# Patient Record
Sex: MALE | Race: BLACK OR AFRICAN AMERICAN | NOT HISPANIC OR LATINO | Employment: OTHER | URBAN - METROPOLITAN AREA
[De-identification: names, ages, dates, MRNs, and addresses within clinical notes are randomized per-mention and may not be internally consistent; named-entity substitution may affect disease eponyms.]

---

## 2017-09-11 ENCOUNTER — OFFICE VISIT (OUTPATIENT)
Dept: FAMILY MEDICINE | Facility: CLINIC | Age: 37
End: 2017-09-11
Payer: COMMERCIAL

## 2017-09-11 VITALS
HEIGHT: 68 IN | OXYGEN SATURATION: 98 % | HEART RATE: 79 BPM | TEMPERATURE: 98.1 F | SYSTOLIC BLOOD PRESSURE: 108 MMHG | WEIGHT: 190 LBS | BODY MASS INDEX: 28.79 KG/M2 | DIASTOLIC BLOOD PRESSURE: 62 MMHG | RESPIRATION RATE: 16 BRPM

## 2017-09-11 DIAGNOSIS — B96.81 DUODENAL ULCER DUE TO HELICOBACTER PYLORI: ICD-10-CM

## 2017-09-11 DIAGNOSIS — F41.9 ANXIETY: ICD-10-CM

## 2017-09-11 DIAGNOSIS — K26.9 DUODENAL ULCER DUE TO HELICOBACTER PYLORI: ICD-10-CM

## 2017-09-11 DIAGNOSIS — A04.8 H. PYLORI INFECTION: Primary | ICD-10-CM

## 2017-09-11 PROCEDURE — 99213 OFFICE O/P EST LOW 20 MIN: CPT | Performed by: FAMILY MEDICINE

## 2017-09-11 RX ORDER — CITALOPRAM HYDROBROMIDE 10 MG/1
10 TABLET ORAL DAILY
Qty: 30 TABLET | Refills: 11 | Status: SHIPPED | OUTPATIENT
Start: 2017-09-11 | End: 2017-09-11

## 2017-09-11 RX ORDER — NICOTINE POLACRILEX 4 MG/1
20 GUM, CHEWING ORAL DAILY
Qty: 30 TABLET | Refills: 11 | Status: SHIPPED | OUTPATIENT
Start: 2017-09-11 | End: 2018-01-22

## 2017-09-11 RX ORDER — AMOXICILLIN 500 MG/1
1000 CAPSULE ORAL 3 TIMES DAILY
Qty: 56 CAPSULE | Refills: 0 | Status: SHIPPED | OUTPATIENT
Start: 2017-09-11 | End: 2017-09-11

## 2017-09-11 RX ORDER — METRONIDAZOLE 500 MG/1
500 TABLET ORAL 3 TIMES DAILY
Qty: 28 TABLET | Refills: 0 | Status: SHIPPED | OUTPATIENT
Start: 2017-09-11 | End: 2017-09-11

## 2017-09-11 RX ORDER — METRONIDAZOLE 500 MG/1
500 TABLET ORAL 3 TIMES DAILY
Qty: 28 TABLET | Refills: 0 | Status: SHIPPED | OUTPATIENT
Start: 2017-09-11 | End: 2018-01-22

## 2017-09-11 RX ORDER — CITALOPRAM HYDROBROMIDE 10 MG/1
10 TABLET ORAL DAILY
Qty: 30 TABLET | Refills: 11 | Status: SHIPPED | OUTPATIENT
Start: 2017-09-11 | End: 2018-01-22

## 2017-09-11 RX ORDER — AMOXICILLIN 500 MG/1
1000 CAPSULE ORAL 3 TIMES DAILY
Qty: 56 CAPSULE | Refills: 0 | Status: SHIPPED | OUTPATIENT
Start: 2017-09-11 | End: 2018-01-22

## 2017-09-11 RX ORDER — NICOTINE POLACRILEX 4 MG/1
20 GUM, CHEWING ORAL DAILY
Qty: 30 TABLET | Refills: 11 | Status: SHIPPED | OUTPATIENT
Start: 2017-09-11 | End: 2017-09-11

## 2017-09-11 ASSESSMENT — ANXIETY QUESTIONNAIRES
3. WORRYING TOO MUCH ABOUT DIFFERENT THINGS: NEARLY EVERY DAY
6. BECOMING EASILY ANNOYED OR IRRITABLE: NOT AT ALL
5. BEING SO RESTLESS THAT IT IS HARD TO SIT STILL: NOT AT ALL
1. FEELING NERVOUS, ANXIOUS, OR ON EDGE: NEARLY EVERY DAY
2. NOT BEING ABLE TO STOP OR CONTROL WORRYING: NEARLY EVERY DAY
GAD7 TOTAL SCORE: 9
7. FEELING AFRAID AS IF SOMETHING AWFUL MIGHT HAPPEN: NOT AT ALL
IF YOU CHECKED OFF ANY PROBLEMS ON THIS QUESTIONNAIRE, HOW DIFFICULT HAVE THESE PROBLEMS MADE IT FOR YOU TO DO YOUR WORK, TAKE CARE OF THINGS AT HOME, OR GET ALONG WITH OTHER PEOPLE: NOT DIFFICULT AT ALL

## 2017-09-11 ASSESSMENT — PATIENT HEALTH QUESTIONNAIRE - PHQ9
5. POOR APPETITE OR OVEREATING: NOT AT ALL
SUM OF ALL RESPONSES TO PHQ QUESTIONS 1-9: 4

## 2017-09-11 NOTE — PATIENT INSTRUCTIONS
1. MODIFIED FAST 250 CALORIES TWICE DAILY FEMALES  300 CALORIES TWICE DAILY FOR MALES   OATMEAL AND COTTAGE CHEESE WORK NICELY   COPIOUS WATER BETWEEN   5/2 PLAN DR HUSSEIN [facility]  NORMAL DIET 5 DAYS PER WEEK  SEMIFAST 2 DAYS PER WEEK  LOOK UP 5-2 PLAN ON THE INTERNET    Weight Loss Tips  1. Do not eat after 6 hrs before your expected bedtime  2. Have your heaviest meal for breakfast, a slightly lighter meal at lunch and a snack 6 hrs before bed  3. No sugar/calorie drinks except milk ie no fruit juice, pop, alcohol.  4. Drink milk OR WATER  30min before meals to decrease your hunger. Also it is excellent as part of your last meal of the day snack  5. Drink lots of water  6. Increase fiber in diet: all bran cereal, salads, popcorn etc  7. Have only one small serving of fruit a day about 1/2 cup (as this is high in sugar)  8. EXERCISE is the bottom line. Without it, you will gain weight even on a low calorie diet. Best if done 2-3X a day as can    2. The only way known to prevent diabetes or keep it from getting worse is exercise, 20-40 minutes 3 times a day around the time of meals      SLEEP 8 HOURS PER DAY    BLACK AND BLUEBERRIES EVERY DAY ANTINFLAMMATORY BENEFIT     PLAIN YOGURT SEVERAL TIMES DAILY AS TOLERATED IF NOT ALLERGIC     AVOID HIGH FRUCTOSE SYRUP AND OLENE IN YOUR DIET     GREEN TEA EXTRACT    PROBIOTIC CAPSULE DAILY  HIGH QUALITY     DON'T EAT LATE AT NIGHT    CHRISTOPH CAMPUZANOA HELPS WITH APPETITE    KEEP A BLESSINGS JOURNAL    888 BREATHER WHEN STRESSED OR COUNT BACK FROM 100 WITH SLOW BREATHING    POSITIVE AFFIRMATIONS         FIT BIT OR PEDOMETER 10,000 STEPS PER DAY     FIT BIT TWICH RECOMMENDED      90 MINUTES EXERCISE              COFFEE AND SUGAR AND TEA AND HIGH SUGAR CEREAL

## 2017-09-11 NOTE — PROGRESS NOTES
SUBJECTIVE:   Rodolfo Bennett is a 37 year old male who presents to clinic today for the following health issues:          Abnormal Mood Symptoms  anxiety      Duration: 3 months    Description:  Depression: no  Anxiety: YES  Panic attacks: no     Accompanying signs and symptoms: see PHQ-9 and GEOVANY scores    History (similar episodes/previous evaluation): occasional anxiety    Precipitating or alleviating factors: had a tooth surgery June 28,2017    Therapies tried and outcome: Celexa (Citalopram)    DENTAL IMPLANT REPLACEMENT    PAIN KILLERS     BREAST REMOVAL     COFFEE ANXIETY     Problem list and histories reviewed & adjusted, as indicated.  Additional history: as documented      Patient Active Problem List   Diagnosis     Hypertrophy of breast     Positive PPD     Abdominal tenderness, epigastric     Anxiety state     Depressive disorder, not elsewhere classified     Closed fracture of shaft of radius (alone)     H. pylori infection     Non morbid obesity due to excess calories     History reviewed. No pertinent surgical history.    Social History   Substance Use Topics     Smoking status: Never Smoker     Smokeless tobacco: Never Used     Alcohol use No     Family History   Problem Relation Age of Onset     DIABETES Mother      Unknown/Adopted Father          Current Outpatient Prescriptions   Medication Sig Dispense Refill     citalopram (CELEXA) 10 MG tablet Take 1 tablet (10 mg) by mouth daily 30 tablet 11     amoxicillin (AMOXIL) 500 MG capsule Take 2 capsules (1,000 mg) by mouth 3 times daily 56 capsule 0     metroNIDAZOLE (FLAGYL) 500 MG tablet Take 1 tablet (500 mg) by mouth 3 times daily 28 tablet 0     omeprazole 20 MG tablet Take 1 tablet (20 mg) by mouth daily Take 30-60 minutes before a meal. 30 tablet 11     [DISCONTINUED] citalopram (CELEXA) 10 MG tablet Take 1 tablet (10 mg) by mouth daily 30 tablet 11     [DISCONTINUED] citalopram (CELEXA) 10 MG tablet Take 1 tablet (10 mg) by mouth daily 30  "tablet 4     Allergies   Allergen Reactions     Dust Mites      Recent Labs   Lab Test  10/09/15   1212  06/25/15   1456  08/25/14   1207  10/07/13   1530   LDL  118   --    --    --    HDL  43   --    --    --    TRIG  66   --    --    --    ALT   --    --    --   51   CR   --   0.79  1.20  1.10   GFRESTIMATED   --   >90  Non  GFR Calc    69  77   GFRESTBLACK   --   >90   GFR Calc    84  >90   POTASSIUM   --   3.6  3.7  4.1   TSH  0.41   --   0.60  0.73      BP Readings from Last 3 Encounters:   09/11/17 108/62   10/06/16 102/62   06/08/16 113/76    Wt Readings from Last 3 Encounters:   09/11/17 190 lb (86.2 kg)   10/06/16 193 lb (87.5 kg)   06/08/16 190 lb (86.2 kg)                  Labs reviewed in EPIC          Reviewed and updated as needed this visit by clinical staff       Reviewed and updated as needed this visit by Provider         ROS:.PERSONAL HISTORY OF UNTREATED  H PYLORI   ANXIETY AND DEPRESSION   PARTIAL TREATMENT   HISTORY OF BREAT REMOVAL   HISTORY OF POSITIVE PPD   HISTORY OF EPIGASTRIC PAIN   HISTORY OF OBESITY IMPROVED TO OVER WEIGHT   has Hypertrophy of breast; Positive PPD; Abdominal tenderness, epigastric; Anxiety state; Depressive disorder, not elsewhere classified; Closed fracture of shaft of radius (alone); H. pylori infection; and Non morbid obesity due to excess calories on his problem list.    Constitutional, HEENT, cardiovascular, pulmonary, gi and gu systems are negative, except as otherwise noted.      OBJECTIVE:   /62  Pulse 79  Temp 98.1  F (36.7  C) (Tympanic)  Resp 16  Ht 5' 8\" (1.727 m)  Wt 190 lb (86.2 kg)  SpO2 98%  BMI 28.89 kg/m2  Body mass index is 28.89 kg/(m^2).  GENERAL: healthy, alert and no distress  EYES: Eyes grossly normal to inspection, PERRL and conjunctivae and sclerae normal  HENT: ear canals and TM's normal, nose and mouth without ulcers or lesions  NECK: no adenopathy, no asymmetry, masses, or scars and thyroid " normal to palpation  RESP: lungs clear to auscultation - no rales, rhonchi or wheezes  CV: regular rate and rhythm, normal S1 S2, no S3 or S4, no murmur, click or rub, no peripheral edema and peripheral pulses strong  ABDOMEN: soft, nontender, no hepatosplenomegaly, no masses and bowel sounds normal  MS: no gross musculoskeletal defects noted, no edema  SKIN: no suspicious lesions or rashes    Diagnostic Test Results:  Results for orders placed or performed in visit on 10/09/15   Vitamin D Deficiency   Result Value Ref Range    Vitamin D Deficiency screening 15 (L) 20 - 75 ug/L   TSH   Result Value Ref Range    TSH 0.41 0.40 - 5.00 mU/L   UA reflex to Microscopic and Culture   Result Value Ref Range    Color Urine Marcella     Appearance Urine Clear     Glucose Urine Negative NEG mg/dL    Bilirubin Urine (A) NEG     Small  This is an unconfirmed screening test result. A positive result may be false.      Ketones Urine Trace (A) NEG mg/dL    Specific Gravity Urine 1.025 1.003 - 1.035    Blood Urine Negative NEG    pH Urine 5.5 5.0 - 7.0 pH    Protein Albumin Urine Negative NEG mg/dL    Urobilinogen Urine 1.0 0.2 - 1.0 EU/dL    Nitrite Urine Negative NEG    Leukocyte Esterase Urine Negative NEG    Source Midstream Urine    Lipid panel reflex to direct LDL   Result Value Ref Range    Cholesterol 174 <200 mg/dL    Triglycerides 66 0 - 150 mg/dL    HDL Cholesterol 43 >40 mg/dL    LDL Cholesterol Calculated 118 0 - 129 mg/dL    VLDL-Cholesterol 13 0 - 30 mg/dL    Cholesterol/HDL Ratio 4.0 0.0 - 5.0   Glucose   Result Value Ref Range    Glucose 77 70 - 99 mg/dL   H Pylori antibody IgG   Result Value Ref Range    Specimen Description Whole Blood     Heliobacter pylori Antibody Screen (A)      Positive for IgG antibody to Helicobacter pylori. May indicate current, recent,   or past infection.      Micro Report Status FINAL 10/09/2015    Urine Microscopic   Result Value Ref Range    WBC Urine O - 2 0 - 2 /HPF    RBC Urine O - 2  0 - 2 /HPF    Squamous Epithelial /LPF Urine Few FEW /LPF    Mucous Urine Present (A) NEG /LPF       ASSESSMENT/PLAN:           ICD-10-CM    1. H. pylori infection A04.8    2. Anxiety F41.9 citalopram (CELEXA) 10 MG tablet     DISCONTINUED: citalopram (CELEXA) 10 MG tablet   3. Duodenal ulcer due to Helicobacter pylori K26.9 amoxicillin (AMOXIL) 500 MG capsule    B96.81 metroNIDAZOLE (FLAGYL) 500 MG tablet     omeprazole 20 MG tablet       Patient Instructions   1. MODIFIED FAST 250 CALORIES TWICE DAILY FEMALES  300 CALORIES TWICE DAILY FOR MALES   OATMEAL AND COTTAGE CHEESE WORK NICELY   COPIOUS WATER BETWEEN   5/2 PLAN DR HUSSEIN [facility]  NORMAL DIET 5 DAYS PER WEEK  SEMIFAST 2 DAYS PER WEEK  LOOK UP 5-2 PLAN ON THE INTERNET    Weight Loss Tips  1. Do not eat after 6 hrs before your expected bedtime  2. Have your heaviest meal for breakfast, a slightly lighter meal at lunch and a snack 6 hrs before bed  3. No sugar/calorie drinks except milk ie no fruit juice, pop, alcohol.  4. Drink milk OR WATER  30min before meals to decrease your hunger. Also it is excellent as part of your last meal of the day snack  5. Drink lots of water  6. Increase fiber in diet: all bran cereal, salads, popcorn etc  7. Have only one small serving of fruit a day about 1/2 cup (as this is high in sugar)  8. EXERCISE is the bottom line. Without it, you will gain weight even on a low calorie diet. Best if done 2-3X a day as can    2. The only way known to prevent diabetes or keep it from getting worse is exercise, 20-40 minutes 3 times a day around the time of meals      SLEEP 8 HOURS PER DAY    BLACK AND BLUEBERRIES EVERY DAY ANTINFLAMMATORY BENEFIT     PLAIN YOGURT SEVERAL TIMES DAILY AS TOLERATED IF NOT ALLERGIC     AVOID HIGH FRUCTOSE SYRUP AND OLENE IN YOUR DIET     GREEN TEA EXTRACT    PROBIOTIC CAPSULE DAILY  HIGH QUALITY     DON'T EAT LATE AT NIGHT    FARRAHUMA FIMBRIATA HELPS WITH APPETITE    KEEP A BLESSINGS JOURNAL    888  BREATHER WHEN STRESSED OR COUNT BACK FROM 100 WITH SLOW BREATHING    POSITIVE AFFIRMATIONS         FIT BIT OR PEDOMETER 10,000 STEPS PER DAY     FIT RADHA LEZAMA RECOMMENDED      90 MINUTES EXERCISE              COFFEE AND SUGAR AND TEA AND HIGH SUGAR CEREAL           MARTY KARIMI MD  [facility]

## 2017-09-11 NOTE — NURSING NOTE
"Chief Complaint   Patient presents with     Anxiety       Initial /62  Pulse 79  Temp 98.1  F (36.7  C) (Tympanic)  Resp 16  Ht 5' 8\" (1.727 m)  Wt 190 lb (86.2 kg)  SpO2 98%  BMI 28.89 kg/m2 Estimated body mass index is 28.89 kg/(m^2) as calculated from the following:    Height as of this encounter: 5' 8\" (1.727 m).    Weight as of this encounter: 190 lb (86.2 kg).  Medication Reconciliation: complete   Allie Shelton CMA    "

## 2017-09-11 NOTE — MR AVS SNAPSHOT
After Visit Summary   9/11/2017    Rodolfo Bennett    MRN: [id number]           Patient Information     Date Of Birth          1980        Visit Information        Provider Department      9/11/2017 3:45 PM Aldo Ricci MD [facility]        Today's Diagnoses     H. pylori infection    -  1    Anxiety        Duodenal ulcer due to Helicobacter pylori          Care Instructions    1. MODIFIED FAST 250 CALORIES TWICE DAILY FEMALES  300 CALORIES TWICE DAILY FOR MALES   OATMEAL AND COTTAGE CHEESE WORK NICELY   COPIOUS WATER BETWEEN   5/2 PLAN DR [facility]USSEIN [facility]  NORMAL DIET 5 DAYS PER WEEK  SEMIFAST 2 DAYS PER WEEK  LOOK UP 5-2 PLAN ON THE INTERNET    Weight Loss Tips  1. Do not eat after 6 hrs before your expected bedtime  2. Have your heaviest meal for breakfast, a slightly lighter meal at lunch and a snack 6 hrs before bed  3. No sugar/calorie drinks except milk ie no fruit juice, pop, alcohol.  4. Drink milk OR WATER  30min before meals to decrease your hunger. Also it is excellent as part of your last meal of the day snack  5. Drink lots of water  6. Increase fiber in diet: all bran cereal, salads, popcorn etc  7. Have only one small serving of fruit a day about 1/2 cup (as this is high in sugar)  8. EXERCISE is the bottom line. Without it, you will gain weight even on a low calorie diet. Best if done 2-3X a day as can    2. The only way known to prevent diabetes or keep it from getting worse is exercise, 20-40 minutes 3 times a day around the time of meals      SLEEP 8 HOURS PER DAY    BLACK AND BLUEBERRIES EVERY DAY ANTINFLAMMATORY BENEFIT     PLAIN YOGURT SEVERAL TIMES DAILY AS TOLERATED IF NOT ALLERGIC     AVOID HIGH FRUCTOSE SYRUP AND OLENE IN YOUR DIET     GREEN TEA EXTRACT    PROBIOTIC CAPSULE DAILY  HIGH QUALITY     DON'T EAT LATE AT NIGHT    CARALLUMA FIMBRIATA HELPS WITH APPETITE    KEEP A BLESSINGS JOURNAL    888 BREATHER WHEN STRESSED  "OR COUNT BACK FROM 100 WITH SLOW BREATHING    POSITIVE AFFIRMATIONS         FIT RADHA OR PEDOMETER 10,000 STEPS PER DAY     FIT BIT TWICH RECOMMENDED      90 MINUTES EXERCISE              COFFEE AND SUGAR AND TEA AND HIGH SUGAR CEREAL               Follow-ups after your visit        Who to contact     If you have questions or need follow up information about today's clinic visit or your schedule please contact [facility] directly at [phone].  Normal or non-critical lab and imaging results will be communicated to you by Trip[employer], letter or phone within 4 business days after the clinic has received the results. If you do not hear from us within 7 days, please contact the clinic through [employer] or phone. If you have a critical or abnormal lab result, we will notify you by phone as soon as possible.  Submit refill requests through [employer] or call your pharmacy and they will forward the refill request to us. Please allow 3 business days for your refill to be completed.          Additional Information About Your Visit        [employer] Information     [employer] lets you send messages to your doctor, view your test results, renew your prescriptions, schedule appointments and more. To sign up, go to [email or web address] . Click on \"Log in\" on the left side of the screen, which will take you to the Welcome page. Then click on \"Sign up Now\" on the right side of the page.     You will be asked to enter the access code listed below, as well as some personal information. Please follow the directions to create your username and password.     Your access code is: [email or web address]  Expires: 12/10/2017  4:30 PM     Your access code will  in 90 days. If you need help or a new code, please call your [place] clinic or [phone].        Care EveryWhere ID     This is your Care EveryWhere ID. This could be used by other organizations to access your [place] medical " "records  [id number]        Your Vitals Were     Pulse Temperature Respirations Height Pulse Oximetry BMI (Body Mass Index)    79 98.1  F (36.7  C) (Tympanic) 16 5' 8\" (1.727 m) 98% 28.89 kg/m2       Blood Pressure from Last 3 Encounters:   09/11/17 108/62   10/06/16 102/62   06/08/16 113/76    Weight from Last 3 Encounters:   09/11/17 190 lb (86.2 kg)   10/06/16 193 lb (87.5 kg)   06/08/16 190 lb (86.2 kg)              Today, you had the following     No orders found for display         Today's Medication Changes          These changes are accurate as of: 9/11/17  4:30 PM.  If you have any questions, ask your nurse or doctor.               Start taking these medicines.        Dose/Directions    amoxicillin 500 MG capsule   Commonly known as:  AMOXIL   Used for:  Duodenal ulcer due to Helicobacter pylori   Started by:  Aldo Ricci MD        Dose:  1000 mg   Take 2 capsules (1,000 mg) by mouth 3 times daily   Quantity:  56 capsule   Refills:  0       citalopram 10 MG tablet   Commonly known as:  celeXA   Used for:  Anxiety   Started by:  Aldo Ricci MD        Dose:  10 mg   Take 1 tablet (10 mg) by mouth daily   Quantity:  30 tablet   Refills:  11       metroNIDAZOLE 500 MG tablet   Commonly known as:  FLAGYL   Used for:  Duodenal ulcer due to Helicobacter pylori   Started by:  Aldo Ricci MD        Dose:  500 mg   Take 1 tablet (500 mg) by mouth 3 times daily   Quantity:  28 tablet   Refills:  0       omeprazole 20 MG tablet   Used for:  Duodenal ulcer due to Helicobacter pylori   Started by:  Aldo Ricci MD        Dose:  20 mg   Take 1 tablet (20 mg) by mouth daily Take 30-60 minutes before a meal.   Quantity:  30 tablet   Refills:  11            Where to get your medicines      These medications were sent to [facility] [id number] [place], [place] - [address] [place], [place] [id number]     Phone:  [phone]     amoxicillin 500 MG capsule "    citalopram 10 MG tablet    metroNIDAZOLE 500 MG tablet    omeprazole 20 MG tablet                Primary Care Provider Office Phone # Fax #    Aldo Aliza Ricci [phone] [id number]  [facility] [address]        Equal Access to Services     MIA SILVEIRA : Hadii jenaro ku hadasho Soomaali, waaxda luqadaha, qaybta kaalmada adeegyada, waxay lola eliseon radha bernardjv laadela rowan. So [facility] [phone].    ATENCIÓN: Si habla español, tiene a flood disposición servicios gratuitos de asistencia lingüística. Llame al [phone].    We comply with applicable federal civil rights laws and [place] laws. We do not discriminate on the basis of race, color, national origin, age, disability sex, sexual orientation or gender identity.            Thank you!     Thank you for choosing [facility]  for your care. Our goal is always to provide you with excellent care. Hearing back from our patients is one way we can continue to improve our services. Please take a few minutes to complete the written survey that you may receive in the mail after your visit with us. Thank you!             Your Updated Medication List - Protect others around you: Learn how to safely use, store and throw away your medicines at www.disposemymeds.org.          This list is accurate as of: 9/11/17  4:30 PM.  Always use your most recent med list.                   Brand Name Dispense Instructions for use Diagnosis    amoxicillin 500 MG capsule    AMOXIL    56 capsule    Take 2 capsules (1,000 mg) by mouth 3 times daily    Duodenal ulcer due to Helicobacter pylori       citalopram 10 MG tablet    celeXA    30 tablet    Take 1 tablet (10 mg) by mouth daily    Anxiety       metroNIDAZOLE 500 MG tablet    FLAGYL    28 tablet    Take 1 tablet (500 mg) by mouth 3 times daily    Duodenal ulcer due to Helicobacter pylori       omeprazole 20 MG tablet     30 tablet    Take 1 tablet (20 mg) by mouth  daily Take 30-60 minutes before a meal.    Duodenal ulcer due to Helicobacter pylori

## 2017-09-12 ASSESSMENT — ANXIETY QUESTIONNAIRES: GAD7 TOTAL SCORE: 9

## 2018-01-22 ENCOUNTER — OFFICE VISIT (OUTPATIENT)
Dept: FAMILY MEDICINE | Facility: CLINIC | Age: 38
End: 2018-01-22
Payer: COMMERCIAL

## 2018-01-22 VITALS
SYSTOLIC BLOOD PRESSURE: 108 MMHG | OXYGEN SATURATION: 98 % | RESPIRATION RATE: 16 BRPM | TEMPERATURE: 97.4 F | DIASTOLIC BLOOD PRESSURE: 64 MMHG | WEIGHT: 200 LBS | BODY MASS INDEX: 30.31 KG/M2 | HEART RATE: 57 BPM | HEIGHT: 68 IN

## 2018-01-22 DIAGNOSIS — L73.9 FOLLICULITIS: ICD-10-CM

## 2018-01-22 DIAGNOSIS — B37.2 YEAST INFECTION OF THE SKIN: Primary | ICD-10-CM

## 2018-01-22 PROCEDURE — 99213 OFFICE O/P EST LOW 20 MIN: CPT | Performed by: FAMILY MEDICINE

## 2018-01-22 RX ORDER — KETOCONAZOLE 20 MG/G
CREAM TOPICAL 2 TIMES DAILY
Qty: 30 G | Refills: 11 | Status: SHIPPED | OUTPATIENT
Start: 2018-01-22 | End: 2019-09-06

## 2018-01-22 RX ORDER — TRIAMCINOLONE ACETONIDE 1 MG/G
CREAM TOPICAL
Qty: 80 G | Refills: 0 | Status: SHIPPED | OUTPATIENT
Start: 2018-01-22 | End: 2018-12-24

## 2018-01-22 RX ORDER — CETIRIZINE HYDROCHLORIDE 10 MG/1
10 TABLET ORAL EVERY EVENING
Qty: 30 TABLET | Refills: 11 | Status: SHIPPED | OUTPATIENT
Start: 2018-01-22 | End: 2019-09-06

## 2018-01-22 NOTE — MR AVS SNAPSHOT
"              After Visit Summary   1/22/2018    Rodolfo Bennett    MRN: [id number]           Patient Information     Date Of Birth          1980        Visit Information        Provider Department      1/22/2018 1:30 PM Aldo Ricci MD [facility]        Today's Diagnoses     Yeast infection of the skin    -  1    Folliculitis          Care Instructions    (B37.2) Yeast infection of the skin  (primary encounter diagnosis)  Comment: groin creases   Plan: ketoconazole (NIZORAL) 2 % cream             (L73.9) Folliculitis  Comment: pubic area bid   Plan: triamcinolone (KENALOG) 0.1 % cream, cetirizine        (ZYRTEC) 10 MG tablet                            Follow-ups after your visit        Who to contact     If you have questions or need follow up information about today's clinic visit or your schedule please contact [facility] directly at [phone].  Normal or non-critical lab and imaging results will be communicated to you by [employer], letter or phone within 4 business days after the clinic has received the results. If you do not hear from us within 7 days, please contact the clinic through [employer]av[employer]art or phone. If you have a critical or abnormal lab result, we will notify you by phone as soon as possible.  Submit refill requests through [employer] or call your pharmacy and they will forward the refill request to us. Please allow 3 business days for your refill to be completed.          Additional Information About Your Visit        Jammin Javahart Information     [employer] lets you send messages to your doctor, view your test results, renew your prescriptions, schedule appointments and more. To sign up, go to [email or web address] . Click on \"Log in\" on the left side of the screen, which will take you to the Welcome page. Then click on \"Sign up Now\" on the right side of the page.     You will be asked to enter the access code listed below, as " "well as some personal information. Please follow the directions to create your username and password.     Your access code is: [email or web address]  Expires: 2018  1:55 PM     Your access code will  in 90 days. If you need help or a new code, please call your [facility] clinic or [phone].        Care EveryWhere ID     This is your Care EveryWhere ID. This could be used by other organizations to access your [facility] medical records  [id number]        Your Vitals Were     Pulse Temperature Respirations Height Pulse Oximetry BMI (Body Mass Index)    57 97.4  F (36.3  C) (Tympanic) 16 5' 8\" (1.727 m) 98% 30.41 kg/m2       Blood Pressure from Last 3 Encounters:   18 108/64   17 108/62   10/06/16 102/62    Weight from Last 3 Encounters:   18 200 lb (90.7 kg)   17 190 lb (86.2 kg)   10/06/16 193 lb (87.5 kg)              Today, you had the following     No orders found for display         Today's Medication Changes          These changes are accurate as of: 18  1:55 PM.  If you have any questions, ask your nurse or doctor.               Start taking these medicines.        Dose/Directions    cetirizine 10 MG tablet   Commonly known as:  zyrTEC   Used for:  Folliculitis   Started by:  Aldo Ricci MD        Dose:  10 mg   Take 1 tablet (10 mg) by mouth every evening   Quantity:  30 tablet   Refills:  11       ketoconazole 2 % cream   Commonly known as:  NIZORAL   Used for:  Yeast infection of the skin   Started by:  Aldo Ricci MD        Apply topically 2 times daily   Quantity:  30 g   Refills:  11       triamcinolone 0.1 % cream   Commonly known as:  KENALOG   Used for:  Folliculitis   Started by:  Aldo Ricci MD        Apply sparingly to affected area three times daily as needed   Quantity:  80 g   Refills:  0            Where to get your medicines      These medications were sent to [facility] [id number] [place], [place] - [address] [place] [address] " [facility] [address]     Phone:  [phone]     cetirizine 10 MG tablet    ketoconazole 2 % cream    triamcinolone 0.1 % cream                Primary Care Provider Office Phone # Fax #    Aldo Aliza Ricci [phone]       [address]  [facility] [address]        Equal Access to Services     MIA SILVEIRA : Hadii aad ku hadasho Soomaali, waaxda luqadaha, qaybta kaalmada adeegyada, waxay idiin hayaan adeeg kharash la'aan ah. So [facility] [phone].    ATENCIÓN: Si habla español, tiene a folod disposición servicios gratuitos de asistencia lingüística. Paulineame al [phone].    We comply with applicable federal civil rights laws and [place] laws. We do not discriminate on the basis of race, color, national origin, age, disability, sex, sexual orientation, or gender identity.            Thank you!     Thank you for choosing [facility]  for your care. Our goal is always to provide you with excellent care. Hearing back from our patients is one way we can continue to improve our services. Please take a few minutes to complete the written survey that you may receive in the mail after your visit with us. Thank you!             Your Updated Medication List - Protect others around you: Learn how to safely use, store and throw away your medicines at www.disposemymeds.org.          This list is accurate as of: 1/22/18  1:55 PM.  Always use your most recent med list.                   Brand Name Dispense Instructions for use Diagnosis    amoxicillin 500 MG capsule    AMOXIL    56 capsule    Take 2 capsules (1,000 mg) by mouth 3 times daily    Duodenal ulcer due to Helicobacter pylori       cetirizine 10 MG tablet    zyrTEC    30 tablet    Take 1 tablet (10 mg) by mouth every evening    Folliculitis       citalopram 10 MG tablet    celeXA    30 tablet    Take 1 tablet (10 mg) by mouth daily    Anxiety       ketoconazole 2 % cream    NIZORAL    30 g     Apply topically 2 times daily    Yeast infection of the skin       metroNIDAZOLE 500 MG tablet    FLAGYL    28 tablet    Take 1 tablet (500 mg) by mouth 3 times daily    Duodenal ulcer due to Helicobacter pylori       omeprazole 20 MG tablet     30 tablet    Take 1 tablet (20 mg) by mouth daily Take 30-60 minutes before a meal.    Duodenal ulcer due to Helicobacter pylori       triamcinolone 0.1 % cream    KENALOG    80 g    Apply sparingly to affected area three times daily as needed    Folliculitis

## 2018-01-22 NOTE — PROGRESS NOTES
SUBJECTIVE:   Rodolfo Bennett is a 37 year old male who presents to clinic today for the following health issues:      Rash      Duration: 3 weeks    Description  Location: groin area, back  Itching: severe    Intensity:  moderate    Accompanying signs and symptoms: redness on back    History (similar episodes/previous evaluation): uses Jacuzzi and hot tubs at the [facility]    Precipitating or alleviating factors:  New exposures:  None  Recent travel: no      Therapies tried and outcome: none    LOSING WEIGHT AND EXERCISING    GROIN AND RIGHT SUPRAPUBIC ITCHING    WORSE WHEN DRIVING A CAB OR AFTER HOT SHOWER    HISTORY OF ANXIETY AND DEPRESSION     HISTORY O FPOS PPD     HISTORY OF  RADIUS SHAFT FRACTURE     HISTORY OF H  PYLORI     MORBID OBESITY IMPROVING with DIET AND EXERCISE    WORRIED ABOUT HERPES    DENIES RECENT SEXUAL ACTIVITY  .*    .  Current Outpatient Prescriptions   Medication Sig Dispense Refill     ketoconazole (NIZORAL) 2 % cream Apply topically 2 times daily 30 g 11     triamcinolone (KENALOG) 0.1 % cream Apply sparingly to affected area three times daily as needed 80 g 0     cetirizine (ZYRTEC) 10 MG tablet Take 1 tablet (10 mg) by mouth every evening 30 tablet 11     citalopram (CELEXA) 10 MG tablet Take 1 tablet (10 mg) by mouth daily (Patient not taking: Reported on 1/22/2018) 30 tablet 11     metroNIDAZOLE (FLAGYL) 500 MG tablet Take 1 tablet (500 mg) by mouth 3 times daily (Patient not taking: Reported on 1/22/2018) 28 tablet 0     amoxicillin (AMOXIL) 500 MG capsule Take 2 capsules (1,000 mg) by mouth 3 times daily (Patient not taking: Reported on 1/22/2018) 56 capsule 0     omeprazole 20 MG tablet Take 1 tablet (20 mg) by mouth daily Take 30-60 minutes before a meal. (Patient not taking: Reported on 1/22/2018) 30 tablet 11          Allergies   Allergen Reactions     Dust Mites          There is no immunization history on file for this patient.      reports that he does not drink alcohol.       reports that he does not use illicit drugs.    family history includes DIABETES in his mother; Unknown/Adopted in his father.    indicated that his mother is alive. He indicated that his father is .      has no past surgical history on file.     reports that he does not engage in sexual activity.  .  Pediatric History   Patient Guardian Status     Mother:  Angle Bennett     Other Topics Concern     Parent/Sibling W/ Cabg, Mi Or Angioplasty Before 65f 55m? No     Social History Narrative         reports that he has never smoked. He has never used smokeless tobacco.    Medical, social, surgical, and family histories reviewed.    Labs reviewed in EPIC  Patient Active Problem List   Diagnosis     Hypertrophy of breast     Positive PPD     Abdominal tenderness, epigastric     Anxiety state     Depressive disorder, not elsewhere classified     Closed fracture of shaft of radius (alone)     H. pylori infection     Non morbid obesity due to excess calories     History reviewed. No pertinent surgical history.    Social History   Substance Use Topics     Smoking status: Never Smoker     Smokeless tobacco: Never Used     Alcohol use No     Family History   Problem Relation Age of Onset     DIABETES Mother      Unknown/Adopted Father          Allergies   Allergen Reactions     Dust Mites      Recent Labs   Lab Test  10/09/15   1212  06/25/15   1456  14   1207  10/07/13   1530   LDL  118   --    --    --    HDL  43   --    --    --    TRIG  66   --    --    --    ALT   --    --    --   51   CR   --   0.79  1.20  1.10   GFRESTIMATED   --   >90  Non  GFR Calc    69  77   GFRESTBLACK   --   >90   GFR Calc    84  >90   POTASSIUM   --   3.6  3.7  4.1   TSH  0.41   --   0.60  0.73        BP Readings from Last 6 Encounters:   18 108/64   17 108/62   10/06/16 102/62   16 113/76   16 125/74   16 100/64       Wt Readings from Last 3 Encounters:   18 200 lb  (90.7 kg)   09/11/17 190 lb (86.2 kg)   10/06/16 193 lb (87.5 kg)         Positive symptoms or findings indicated by bold designation:     ROS: 10 point ROS neg other than the symptoms noted above in the HPI.except  has Hypertrophy of breast; Positive PPD; Abdominal tenderness, epigastric; Anxiety state; Depressive disorder, not elsewhere classified; Closed fracture of shaft of radius (alone); H. pylori infection; and Non morbid obesity due to excess calories on his problem list.   Constitutional: The patient denied fatigue, fever, insomnia, night sweats, recent illness and weight loss.  SIGNIFICANT OBESITY MILD     Eyes: The patient denied blindness, eye pain, eye tearing, photophobia, vision change and visual disturbance.       Ears/Nose/Throat/Neck: The patient denied dizziness, facial pain, hearing loss, nasal discharge, oral pain, otalgia, postnasal drip, sinus congestion, sore throat, tinnitus and voice change.       Cardiovascular: The patient denied arrhythmia, chest pain/pressure, claudication, edema, exercise intolerance, fatigue, orthopnea, palpitations and syncope.      Respiratory: The patient denied asthma, chest congestion, cough, dyspnea on exertion, dyspnea/shortness of breath, hemoptysis, pedal edema, pleuritic pain, productive sputum, snoring and wheezing.     Gastrointestinal: The patient denied abdominal pain, anorexia, constipation, diarrhea, dysphagia, gastroesophageal reflux, hematochezia, hemorrhoids, melena, nausea and vomiting .     Genitourinary/Nephrology: The patient denied breast complaint, dysuria, nocturia sexual dysfunction, t, urinary frequency, urinary incontinence, urinary urgency        Musculoskeletal: The patient denied arthralgia(s), back pain, joint complaint, muscle weakness, myalgias, osteoporosis, sciatica, stiffness and swelling.      Dermatoligic:: The patient denied acne, dermatitis, ecchymosis, itching, mole change, rash, skin cancer, skin lesion and sores.  RASH  "GROIN AND RIGHT SUPRAPUBIC FOLLICULAR    Neurologic: The patient denied dizziness, gait abnormality, headache, memory loss, mental status change, paresis, paresthesia, seizure, syncope, tremor and vision change.     Psychiatric: The patient denied anxiety, depression, disturbances of memory, drug abuse, insomnia, mood swings and relationship difficulties.      Endocrine: The patient denied , goiter, obesity, polyuria and thyroid disease.      Hematologic/Lymphatic: The patient denied abnormal bleeding and bruising, abnormal ecchymoses, anemia, lymph node enlargement/mass, petechiae and venous  Thrombosis.      Allergy/Immunology: The patient denied food allergy and  Allergic rhinitis or conjunctivitis.        PE:  /64  Pulse 57  Temp 97.4  F (36.3  C) (Tympanic)  Resp 16  Ht 5' 8\" (1.727 m)  Wt 200 lb (90.7 kg)  SpO2 98%  BMI 30.41 kg/m2 Body mass index is 30.41 kg/(m^2).    Constitutional: general appearance, well nourished, well developed, in no acute distress, well developed, appears stated age, normal body habitus,      Eyes:; The patient has normal eyelids sclerae and conjunctivae :      Ears/Nose/Throat: external ear, overall: normal appearance; external nose, overall: benign appearance, normal moujth gums and lips  The patient has:      Neck: thyroid, overall: normal size, normal consistency, nontender,      Respiratory:  palpation of chest, overall: normal excursion,    Clear to percussion and auscultation  NORMAL     Tachypnea  NORMAL  Color  NORMAL     Cardiovascular:  Good color with no peripheral edema    Regular sinus rhythm without murmur. Physiologic heart sounds Heart is unelarged  .   Chest/Breast: normal shape        Abdominal exam,  Liver and spleen are  unenlarged        Tenderness    Scars  NORMAL      Urogenital; no renal, flank or bladder  tenderness;  NORMAL      Lymphatic: neck nodes,  NORMAL    Other nodes     Musculoskeletal:  Brief ortho exam normal except:   NORMAL RANGE OF " MOTION UPPER EXTREMETIES AND LOWER EXTREMITY BILATERAL AND LOWER BACK PAIN     Integument: inspection of skin, no rash, lesions; and, palpation, no induration, no tenderness.  IRRITATION OF BILATERAL GROIN AND RIGHT SUPRAPUBIC FOLLICULAR CHANGES     Neurologic mental status, overall: alert and oriented; gait, no ataxia, no unsteadiness; coordination, no tremors; cranial nerves, overall: normal motor, overall: normal bulk, tone.      Psychiatric: orientation/consciousness, overall: oriented to person, place and time; behavior/psychomotor activity, no tics, normal psychomotor activity; mood and affect, overall: normal mood and affect; appearance, overall: well-groomed, good eye contact; speech, overall: normal quality, no aphasia and normal quality, quantity, intact.      Diagnostic Test Results:  Results for orders placed or performed in visit on 10/09/15   Vitamin D Deficiency   Result Value Ref Range    Vitamin D Deficiency screening 15 (L) 20 - 75 ug/L   TSH   Result Value Ref Range    TSH 0.41 0.40 - 5.00 mU/L   UA reflex to Microscopic and Culture   Result Value Ref Range    Color Urine Marcella     Appearance Urine Clear     Glucose Urine Negative NEG mg/dL    Bilirubin Urine (A) NEG     Small  This is an unconfirmed screening test result. A positive result may be false.      Ketones Urine Trace (A) NEG mg/dL    Specific Gravity Urine 1.025 1.003 - 1.035    Blood Urine Negative NEG    pH Urine 5.5 5.0 - 7.0 pH    Protein Albumin Urine Negative NEG mg/dL    Urobilinogen Urine 1.0 0.2 - 1.0 EU/dL    Nitrite Urine Negative NEG    Leukocyte Esterase Urine Negative NEG    Source Midstream Urine    Lipid panel reflex to direct LDL   Result Value Ref Range    Cholesterol 174 <200 mg/dL    Triglycerides 66 0 - 150 mg/dL    HDL Cholesterol 43 >40 mg/dL    LDL Cholesterol Calculated 118 0 - 129 mg/dL    VLDL-Cholesterol 13 0 - 30 mg/dL    Cholesterol/HDL Ratio 4.0 0.0 - 5.0   Glucose   Result Value Ref Range    Glucose 77  70 - 99 mg/dL   H Pylori antibody IgG   Result Value Ref Range    Specimen Description Whole Blood     Heliobacter pylori Antibody Screen (A)      Positive for IgG antibody to Helicobacter pylori. May indicate current, recent,   or past infection.      Micro Report Status FINAL 10/09/2015    Urine Microscopic   Result Value Ref Range    WBC Urine O - 2 0 - 2 /HPF    RBC Urine O - 2 0 - 2 /HPF    Squamous Epithelial /LPF Urine Few FEW /LPF    Mucous Urine Present (A) NEG /LPF         ICD-10-CM    1. Yeast infection of the skin B37.2 ketoconazole (NIZORAL) 2 % cream    groin creases    2. Folliculitis L73.9 triamcinolone (KENALOG) 0.1 % cream     cetirizine (ZYRTEC) 10 MG tablet    pubic area bid         .  Side effects benefits and risks thoroughly discussed. .he may come in early if unimproved or getting worse      Importance of adhering to regimen discussed and if medications were dispensed, the importance of taking medications discussed and bringing in the medications after every visit for chronic problems     Please drink 2 glasses of water prior to meals and walk 15-30 minutes after meals    I spent  15 MINUTES   with patient discussing the following issues   The primary encounter diagnosis was Yeast infection of the skin. A diagnosis of Folliculitis was also pertinent to this visit. over half of which involved counseling and coordination of care.    Patient Instructions   (B37.2) Yeast infection of the skin  (primary encounter diagnosis)  Comment: groin creases   Plan: ketoconazole (NIZORAL) 2 % cream             (L73.9) Folliculitis  Comment: pubic area bid   Plan: triamcinolone (KENALOG) 0.1 % cream, cetirizine        (ZYRTEC) 10 MG tablet                        ALL THE ABOVE PROBLEMS ARE STABLE AND MED CHANGES AS NOTED    Diet:  MEDITERRANEAN DIET     Exercise:  UPPER EXTREMETIES AND LOWER EXTREMITY AND LOWER BACK PAIN   Exercises Range of motion, balance, isometric, and strengthening exercises 30  repetitions twice daily of involved joints      .MARTY KARIMI MD 1/22/2018 2:07 PM  January 22, 2018

## 2018-01-22 NOTE — PATIENT INSTRUCTIONS
(B37.2) Yeast infection of the skin  (primary encounter diagnosis)  Comment: groin creases   Plan: ketoconazole (NIZORAL) 2 % cream             (L73.9) Folliculitis  Comment: pubic area bid   Plan: triamcinolone (KENALOG) 0.1 % cream, cetirizine        (ZYRTEC) 10 MG tablet

## 2018-01-22 NOTE — NURSING NOTE
"Chief Complaint   Patient presents with     Derm Problem       Initial /64  Pulse 57  Temp 97.4  F (36.3  C) (Tympanic)  Resp 16  Ht 5' 8\" (1.727 m)  Wt 200 lb (90.7 kg)  SpO2 98%  BMI 30.41 kg/m2 Estimated body mass index is 30.41 kg/(m^2) as calculated from the following:    Height as of this encounter: 5' 8\" (1.727 m).    Weight as of this encounter: 200 lb (90.7 kg).  Medication Reconciliation: complete   Allie Shelton CMA    "

## 2018-08-12 ENCOUNTER — APPOINTMENT (OUTPATIENT)
Dept: CT IMAGING | Facility: CLINIC | Age: 38
End: 2018-08-12
Attending: EMERGENCY MEDICINE
Payer: COMMERCIAL

## 2018-08-12 ENCOUNTER — HOSPITAL ENCOUNTER (EMERGENCY)
Facility: CLINIC | Age: 38
Discharge: HOME | End: 2018-08-12
Attending: EMERGENCY MEDICINE | Admitting: EMERGENCY MEDICINE
Payer: COMMERCIAL

## 2018-08-12 ENCOUNTER — APPOINTMENT (OUTPATIENT)
Dept: GENERAL RADIOLOGY | Facility: CLINIC | Age: 38
End: 2018-08-12
Attending: EMERGENCY MEDICINE
Payer: COMMERCIAL

## 2018-08-12 ENCOUNTER — APPOINTMENT (OUTPATIENT)
Dept: ULTRASOUND IMAGING | Facility: CLINIC | Age: 38
End: 2018-08-12
Attending: EMERGENCY MEDICINE
Payer: COMMERCIAL

## 2018-08-12 VITALS
HEART RATE: 58 BPM | TEMPERATURE: 98.2 F | OXYGEN SATURATION: 96 % | SYSTOLIC BLOOD PRESSURE: 101 MMHG | WEIGHT: 202.31 LBS | HEIGHT: 68 IN | RESPIRATION RATE: 20 BRPM | DIASTOLIC BLOOD PRESSURE: 71 MMHG | BODY MASS INDEX: 30.66 KG/M2

## 2018-08-12 DIAGNOSIS — R55 NEAR SYNCOPE: ICD-10-CM

## 2018-08-12 DIAGNOSIS — H53.9 VISION CHANGES: ICD-10-CM

## 2018-08-12 DIAGNOSIS — F41.9 ANXIETY: ICD-10-CM

## 2018-08-12 DIAGNOSIS — R42 DIZZINESS: ICD-10-CM

## 2018-08-12 LAB
ALBUMIN SERPL-MCNC: 3.6 G/DL (ref 3.4–5)
ALP SERPL-CCNC: 59 U/L (ref 40–150)
ALT SERPL W P-5'-P-CCNC: 37 U/L (ref 0–70)
ANION GAP SERPL CALCULATED.3IONS-SCNC: 4 MMOL/L (ref 3–14)
AST SERPL W P-5'-P-CCNC: 17 U/L (ref 0–45)
BASOPHILS # BLD AUTO: 0 10E9/L (ref 0–0.2)
BASOPHILS NFR BLD AUTO: 0.3 %
BILIRUB SERPL-MCNC: 0.3 MG/DL (ref 0.2–1.3)
BUN SERPL-MCNC: 15 MG/DL (ref 7–30)
CALCIUM SERPL-MCNC: 8.5 MG/DL (ref 8.5–10.1)
CHLORIDE SERPL-SCNC: 105 MMOL/L (ref 94–109)
CO2 SERPL-SCNC: 29 MMOL/L (ref 20–32)
CREAT SERPL-MCNC: 0.82 MG/DL (ref 0.66–1.25)
DIFFERENTIAL METHOD BLD: NORMAL
EOSINOPHIL # BLD AUTO: 0.2 10E9/L (ref 0–0.7)
EOSINOPHIL NFR BLD AUTO: 3.5 %
ERYTHROCYTE [DISTWIDTH] IN BLOOD BY AUTOMATED COUNT: 13.1 % (ref 10–15)
GFR SERPL CREATININE-BSD FRML MDRD: >90 ML/MIN/1.7M2
GLUCOSE SERPL-MCNC: 102 MG/DL (ref 70–99)
HCT VFR BLD AUTO: 43.4 % (ref 40–53)
HGB BLD-MCNC: 15.2 G/DL (ref 13.3–17.7)
IMM GRANULOCYTES # BLD: 0 10E9/L (ref 0–0.4)
IMM GRANULOCYTES NFR BLD: 0.2 %
LYMPHOCYTES # BLD AUTO: 2.3 10E9/L (ref 0.8–5.3)
LYMPHOCYTES NFR BLD AUTO: 37.1 %
MAGNESIUM SERPL-MCNC: 1.9 MG/DL (ref 1.6–2.3)
MCH RBC QN AUTO: 32.1 PG (ref 26.5–33)
MCHC RBC AUTO-ENTMCNC: 35 G/DL (ref 31.5–36.5)
MCV RBC AUTO: 92 FL (ref 78–100)
MONOCYTES # BLD AUTO: 0.5 10E9/L (ref 0–1.3)
MONOCYTES NFR BLD AUTO: 7.5 %
NEUTROPHILS # BLD AUTO: 3.2 10E9/L (ref 1.6–8.3)
NEUTROPHILS NFR BLD AUTO: 51.4 %
NRBC # BLD AUTO: 0 10*3/UL
NRBC BLD AUTO-RTO: 0 /100
PLATELET # BLD AUTO: 246 10E9/L (ref 150–450)
POTASSIUM SERPL-SCNC: 3.7 MMOL/L (ref 3.4–5.3)
PROT SERPL-MCNC: 7.1 G/DL (ref 6.8–8.8)
RBC # BLD AUTO: 4.74 10E12/L (ref 4.4–5.9)
SODIUM SERPL-SCNC: 139 MMOL/L (ref 133–144)
TROPONIN I SERPL-MCNC: <0.015 UG/L (ref 0–0.04)
WBC # BLD AUTO: 6.2 10E9/L (ref 4–11)

## 2018-08-12 PROCEDURE — 93010 ELECTROCARDIOGRAM REPORT: CPT | Mod: Z6 | Performed by: EMERGENCY MEDICINE

## 2018-08-12 PROCEDURE — 93005 ELECTROCARDIOGRAM TRACING: CPT | Performed by: EMERGENCY MEDICINE

## 2018-08-12 PROCEDURE — 96360 HYDRATION IV INFUSION INIT: CPT | Performed by: EMERGENCY MEDICINE

## 2018-08-12 PROCEDURE — 25000128 H RX IP 250 OP 636: Performed by: EMERGENCY MEDICINE

## 2018-08-12 PROCEDURE — 99285 EMERGENCY DEPT VISIT HI MDM: CPT | Mod: 25 | Performed by: EMERGENCY MEDICINE

## 2018-08-12 PROCEDURE — 83735 ASSAY OF MAGNESIUM: CPT | Performed by: EMERGENCY MEDICINE

## 2018-08-12 PROCEDURE — 84484 ASSAY OF TROPONIN QUANT: CPT | Performed by: EMERGENCY MEDICINE

## 2018-08-12 PROCEDURE — 71045 X-RAY EXAM CHEST 1 VIEW: CPT

## 2018-08-12 PROCEDURE — 93880 EXTRACRANIAL BILAT STUDY: CPT

## 2018-08-12 PROCEDURE — 80053 COMPREHEN METABOLIC PANEL: CPT | Performed by: EMERGENCY MEDICINE

## 2018-08-12 PROCEDURE — 70450 CT HEAD/BRAIN W/O DYE: CPT

## 2018-08-12 PROCEDURE — 85025 COMPLETE CBC W/AUTO DIFF WBC: CPT | Performed by: EMERGENCY MEDICINE

## 2018-08-12 PROCEDURE — 96361 HYDRATE IV INFUSION ADD-ON: CPT | Performed by: EMERGENCY MEDICINE

## 2018-08-12 RX ORDER — SODIUM CHLORIDE 9 MG/ML
1000 INJECTION, SOLUTION INTRAVENOUS CONTINUOUS
Status: DISCONTINUED | OUTPATIENT
Start: 2018-08-12 | End: 2018-08-12 | Stop reason: HOSPADM

## 2018-08-12 RX ADMIN — SODIUM CHLORIDE 1000 ML: 900 INJECTION, SOLUTION INTRAVENOUS at 17:30

## 2018-08-12 ASSESSMENT — ENCOUNTER SYMPTOMS
DIZZINESS: 1
ABDOMINAL PAIN: 0
SHORTNESS OF BREATH: 0
FEVER: 0

## 2018-08-12 NOTE — ED AVS SNAPSHOT
[facility], [place], Emergency Department    [address] [place] [id number]    Phone:  [phone]                                       Rodolfo Bennett   MRN: [id number]    Department:  [facility], Emergency Department   Date of Visit:  8/12/2018           After Visit Summary Signature Page     I have received my discharge instructions, and my questions have been answered. I have discussed any challenges I see with this plan with the nurse or doctor.    ..........................................................................................................................................  Patient/Patient Representative Signature      ..........................................................................................................................................  Patient Representative Print Name and Relationship to Patient    ..................................................               ................................................  Date                                            Time    ..........................................................................................................................................  Reviewed by Signature/Title    ...................................................              ..............................................  Date                                                            Time

## 2018-08-12 NOTE — ED PROVIDER NOTES
History     Chief Complaint   Patient presents with     Dizziness     HPI  Rodolfo Bennett is a 38 year old male who presents for evaluation of acute, near syncope and episode of blurriness of the vision.  The patient reports that in the last couple of days he has had episodes of anxiety whenever he thinks about the future.  He states that he has not had any particular recent stressors.  He states that then in the last 24 hours or so he has been experiencing episodes where and he very briefly gets blurry vision, which is occurs for example when he is lying down or sitting up abruptly.  He denies a history of this.  The patient denies chest pain shortness of breath.  No abdominal pain.  No other concerns. He is otherwise generally healthy.  Patient does admit to his head spinning focal weakness no focal numbness.    Current Facility-Administered Medications   Medication     0.9% sodium chloride BOLUS    Followed by     sodium chloride 0.9% infusion     Current Outpatient Prescriptions   Medication     cetirizine (ZYRTEC) 10 MG tablet     ketoconazole (NIZORAL) 2 % cream     triamcinolone (KENALOG) 0.1 % cream     Past Medical History:   Diagnosis Date     Allergic state      Anxiety        History reviewed. No pertinent surgical history.    Family History   Problem Relation Age of Onset     Diabetes Mother      Unknown/Adopted Father        Social History   Substance Use Topics     Smoking status: Never Smoker     Smokeless tobacco: Never Used     Alcohol use No     Allergies   Allergen Reactions     Dust Mites        I have reviewed the Medications, Allergies, Past Medical and Surgical History, and Social History in the Epic system.    Review of Systems   Constitutional: Negative for fever.   Eyes: Positive for visual disturbance (see HPI).   Respiratory: Negative for shortness of breath.    Cardiovascular: Negative for chest pain.   Gastrointestinal: Negative for abdominal pain.   Neurological: Positive for  "dizziness.        Episodes of near-syncope, see HPI   All other systems reviewed and are negative.      Physical Exam   BP: 116/62  Pulse: 58  Temp: 98.2  F (36.8  C)  Resp: 16  Height: 172.7 cm (5' 8\")  Weight: 91.8 kg (202 lb 5 oz)  SpO2: 96 %      Physical Exam   Constitutional: He is oriented to person, place, and time. He appears well-developed and well-nourished. No distress.   HENT:   Head: Normocephalic and atraumatic.   Mouth/Throat: Oropharynx is clear and moist. No oropharyngeal exudate.   Eyes: Pupils are equal, round, and reactive to light. No scleral icterus.   Neck: Normal range of motion. Neck supple.   Cardiovascular: Normal heart sounds and intact distal pulses.    Pulmonary/Chest: Breath sounds normal. No respiratory distress.   Abdominal: Soft. Bowel sounds are normal. There is no tenderness.   Musculoskeletal: He exhibits no edema or tenderness.   Neurological: He is alert and oriented to person, place, and time. No cranial nerve deficit or sensory deficit. GCS eye subscore is 4. GCS verbal subscore is 5. GCS motor subscore is 6.   Skin: Skin is warm and dry. No rash noted. He is not diaphoretic. No erythema. No pallor.       ED Course     ED Course   Addendum 1740 hrs. she is doing well he is in no acute distress patient is likely having symptoms secondary to vasovagal episode along with anxiety.  Sure if that he safety I will evaluate him for near syncope.  Patient agrees    Addendum 1932 hrs. is doing well he is in no acute distress his workup is very reassuring at this point I do not suspect that this patient having TIAs and/or a neurosis fugax I plan to discharge to home with follow-up with neurology as well as ophthalmology for further evaluation and treatment patient agrees    Procedures   None         EKG Interpretation:      Interpreted by Garrett Ruiz  Time reviewed: 1740 hrs.  Symptoms at time of EKG: Unchanged  Rhythm: Has bradycardia  Rate: normal  Axis: normal  Ectopy: " "none  Conduction: normal  ST Segments/ T Waves: No ST-T wave changes  Q Waves: none  Comparison to prior: No old EKG available    Clinical Impression: normal EKG          Critical Care time:  none             Labs Ordered and Resulted from Time of ED Arrival Up to the Time of Departure from the ED   COMPREHENSIVE METABOLIC PANEL - Abnormal; Notable for the following:        Result Value    Glucose 102 (*)     All other components within normal limits   CBC WITH PLATELETS DIFFERENTIAL   TROPONIN I   MAGNESIUM   PERIPHERAL IV CATHETER   CARDIAC CONTINUOUS MONITORING            Assessments & Plan (with Medical Decision Making)   This is a well-appearing 38-year-old male that comes in for evaluation of near syncope dizziness and blurry vision that has been ongoing for a brief second.  Patient thinks that he is very stressed out and that he got many stressors and worries about issues with \"\" his girlfriend\" as well as his kids.  Neurologically patient is intact he is in no acute distress he is walking within normal limits normal gait normal cranial nerves.  Want to do screening evaluation for near syncope and if negative will discharge to home with follow-up with neurology/Opthomology patient agrees.    I have reviewed the nursing notes.    I have reviewed the findings, diagnosis, plan and need for follow up with the patient.    New Prescriptions    No medications on file       Final diagnoses:   Near syncope   Anxiety   Dizziness   Vision changes     I, Doe Anderson, am serving as a trained medical scribe to document services personally performed by Garrett Ruiz MD, based on the provider's statements to me.      Garrett THURSTON MD, was physically present and have reviewed and verified the accuracy of this note documented by Doe Anderson.    8/12/2018   [facility], EMERGENCY DEPARTMENT     Garrett Ruiz MD  08/12/18 1934    "

## 2018-08-12 NOTE — ED TRIAGE NOTES
38 year old male presents with complaints of vertigo when he is lying down; when he turns his head the room spins.  Patient states that he is currently under a lot of stress; when he falls asleep he will suddenly jerk and then experience the vertigo.

## 2018-08-12 NOTE — ED AVS SNAPSHOT
[facility], Emergency Department    [address] [address]    Phone:  [phone]                                       Rodolfo Bennett   MRN: [id number]    Department:  [facility], Emergency Department   Date of Visit:  8/12/2018           Patient Information     Date Of Birth          1980        Your diagnoses for this visit were:     Near syncope     Anxiety     Dizziness     Vision changes        You were seen by Garrett Ruiz MD.      Follow-up Information     Follow up with Aldo Ricci MD In 2 days.    Specialty:  Family Practice    Why:  To see if any other test will be needed    Contact information:    [address]  [facility] [address]  [phone]          Discharge Instructions           Dizziness (Vertigo) and Balance Problems: Staying Safe     Replace burned-out light bulbs to keep your home safe and well lit.     Falls or accidents can lead to pain, broken bones, and fear of future falls. Protect yourself and others by preparing for episodes. Simple steps can help you stay safe at home and wherever you go.  Lighting  Keep all areas well lit. This helps your eyes send the right signals to the brain. It also makes you less likely to trip and fall. If bright lights make symptoms worse, dim the lights or lie in a dark room until the dizziness passes. Then turn the lights back to their normal level.  Tips:    Keep a flashlight by the bed.    Place nightlights in bathrooms and hallways.    Replace burned-out bulbs, or have someone replace them for you.  Preventing falls  To reduce your risk of falling:    Get out of bed or up from a chair slowly.    Wear low-heeled shoes that fit properly and have slip-resistant soles.    Remove throw rugs. Clear clutter from walkways.    Use handrails on stairs. Have handrails installed or adjusted if needed.    Install grab bars in the bathroom. Don't use towel racks for balance.    Use a shower stool. Also put  adhesive strips in the shower or on the tub floor.  Going out  With a little time and preparation, you can get around safely.  Tips:    Bring a cane or walking aid if needed.    Give yourself plenty of time in case you start to get dizzy.    Ask your healthcare provider what type of exercise is safe for your condition.    Be patient. If an activity such as walking through a crowded shop causes you stress, you may not be ready for it yet.  Driving  If you become dizzy or disoriented while driving, you could hurt yourself and others. That's why it's best to not drive until symptoms have gone away. In some cases, your license may be temporarily held until it's safe for you to drive again.  For safety:    Ask a friend to drive for you.    Take public transportation.    Walk to stores and other places when you can.  Asking for help  Don't be afraid to ask for help running errands, cooking meals, and doing exercise. Whether it's a friend, loved one, neighbor, or stranger on the street, a little help can make a world of difference.   Date Last Reviewed: 11/1/2016 2000-2017 [employer] [address] [place] [address]. All rights reserved. This information is not intended as a substitute for professional medical care. Always follow your healthcare professional's instructions.          Anxiety Reaction  Anxiety is the feeling we all get when we think something bad might happen. It is a normal response to stress and usually causes only a mild reaction. When anxiety becomes more severe, it can interfere with daily life. In some cases, you may not even be aware of what it is you re anxious about. There may also be a genetic link or it may be a learned behavior in the home.  Both psychological and physical triggers cause stress reaction. It's often a response to fear or emotional stress, real or imagined. This stress may come from home, family, work, or social relationships.  During an anxiety  reaction, you may feel:    Helpless    Nervous    Depressed    Irritable  Your body may show signs of anxiety in many ways. You may experience:    Dry mouth    Shakiness    Dizziness    Weakness    Trouble breathing    Breathing fast (hyperventilating)    Chest pressure    Sweating    Headache    Nausea    Diarrhea    Tiredness    Inability to sleep    Sexual problems  Home care    Try to locate the sources of stress in your life. They may not be obvious. These may include:  ? Daily hassles of life (such as traffic jams, missed appointments, or car troubles)  ? Major life changes, both good (new baby or job promotion) and bad (loss of job or loss of loved one)  ? Overload: feeling that you have too many responsibilities and can't take care of all of them at once  ? Feeling helpless or feeling that your problems are beyond what you re able to solve    Notice how your body reacts to stress. Learn to listen to your body signals. This will help you take action before the stress becomes severe.    When you can, do something about the source of your stress. (Avoid hassles, limit the amount of change that happens in your life at one time and take a break when you feel overloaded).    Unfortunately, many stressful situations can't be avoided. It is necessary to learn how to better manage stress. There are many proven methods that will reduce your anxiety. These include simple things like exercise, good nutrition, and adequate rest. Also, there are certain techniques that are helpful:  ? Relaxation  ? Breathing exercises  ? Visualization  ? Biofeedback  ? Meditation  For more information about this, consult your healthcare provider or go to a local bookstore and review the many books and tapes available on this subject.  Follow-up care  If you feel that your anxiety is not responding to self-help measures, contact your healthcare provider or make an appointment with a counselor. You may need short-term psychological  counseling and temporary medicine to help you manage stress.  Call 911  Call 911 if any of these happen:    Trouble breathing    Confusion    Drowsiness or trouble wakening    Fainting or loss of consciousness    Rapid heart rate    Seizure    New chest pain that becomes more severe, lasts longer, or spreads into your shoulder, arm, neck, jaw, or back  When to seek medical advice  Call your healthcare provider right away if any of these happen:    Your symptoms get worse    Severe headache not relieved by rest and mild pain reliever  Date Last Reviewed: 10/1/2017    9959-4279 [employer] [address] [place] [address]. All rights reserved. This information is not intended as a substitute for professional medical care. Always follow your healthcare professional's instructions.      Is very important that you follow-up with your primary care doctor in 2 days to make sure that you are getting better and to see if any other test of treatments will be needed if fevers nausea vomiting you pass out if weakness develops or any other concerns please return to emergency department immediately.  Should follow-up as already instructed      Please make an appointment to follow up with Neurology Clinic (phone: [phone]) as soon as possible even if entirely better.    Please make an appointment to follow up with Eye Clinic (phone: [phone]) as soon as possible even if entirely better.    24 Hour Appointment Hotline       To make an appointment at any [facility], call [phone] ([phone]). If you don't have a family doctor or clinic, we will help you find one. [facility] are conveniently located to serve the needs of you and your family.             Review of your medicines      Our records show that you are taking the medicines listed below. If these are incorrect, please call your family doctor or clinic.        Dose / Directions Last dose taken    cetirizine 10 MG tablet    Commonly known as:  zyrTEC   Dose:  10 mg   Quantity:  30 tablet        Take 1 tablet (10 mg) by mouth every evening   Refills:  11        ketoconazole 2 % cream   Commonly known as:  NIZORAL   Quantity:  30 g        Apply topically 2 times daily   Refills:  11        triamcinolone 0.1 % cream   Commonly known as:  KENALOG   Quantity:  80 g        Apply sparingly to affected area three times daily as needed   Refills:  0                Procedures and tests performed during your visit     CBC with platelets differential    CT Head w/o Contrast    Cardiac Continuous Monitoring    Comprehensive metabolic panel    EKG 12 lead    Magnesium    Peripheral IV catheter    Troponin I    US Carotid Bilateral    XR Chest Port 1 View      Orders Needing Specimen Collection     None      Pending Results     Date and Time Order Name Status Description    8/12/2018 1714 EKG 12 lead Preliminary     8/12/2018 1714 CT Head w/o Contrast Preliminary             Pending Culture Results     No orders found from 8/10/2018 to 8/13/2018.            Pending Results Instructions     If you had any lab results that were not finalized at the time of your Discharge, you can call the ED Lab Result RN at [phone]. You will be contacted by this team for any positive Lab results or changes in treatment. The nurses are available 7 days a week from 10A to 6:30P.  You can leave a message 24 hours per day and they will return your call.        Thank you for choosing [facility]       Thank you for choosing [facility] for your care. Our goal is always to provide you with excellent care. Hearing back from our patients is one way we can continue to improve our services. Please take a few minutes to complete the written survey that you may receive in the mail after you visit with us. Thank you!        [employer] Information     [employer] lets you send messages to your doctor, view your test results, renew your prescriptions, schedule appointments and more. To  "sign up, go to [email or web address] . Click on \"Log in\" on the left side of the screen, which will take you to the Welcome page. Then click on \"Sign up Now\" on the right side of the page.     You will be asked to enter the access code listed below, as well as some personal information. Please follow the directions to create your username and password.     Your access code is: [email or web address]  Expires: 11/10/2018  7:47 PM     Your access code will  in 90 days. If you need help or a new code, please call your [facility] clinic or [phone].        Care EveryWhere ID     This is your Care EveryWhere ID. This could be used by other organizations to access your [facility] medical records  [id number]        Equal Access to Services     MIA SILVEIRA : Virginia Mcarthur, rosendo snow, jerson pino, nacho rowan. So [facility] [phone].    ATENCIÓN: Si habla español, tiene a flood disposición servicios gratuitos de asistencia lingüística. Llame al [phone].    We comply with applicable federal civil rights laws and [place] laws. We do not discriminate on the basis of race, color, national origin, age, disability, sex, sexual orientation, or gender identity.            After Visit Summary       This is your record. Keep this with you and show to your community pharmacist(s) and doctor(s) at your next visit.                  "

## 2018-08-13 LAB — INTERPRETATION ECG - MUSE: NORMAL

## 2018-08-13 NOTE — DISCHARGE INSTRUCTIONS
Dizziness (Vertigo) and Balance Problems: Staying Safe     Replace burned-out light bulbs to keep your home safe and well lit.     Falls or accidents can lead to pain, broken bones, and fear of future falls. Protect yourself and others by preparing for episodes. Simple steps can help you stay safe at home and wherever you go.  Lighting  Keep all areas well lit. This helps your eyes send the right signals to the brain. It also makes you less likely to trip and fall. If bright lights make symptoms worse, dim the lights or lie in a dark room until the dizziness passes. Then turn the lights back to their normal level.  Tips:    Keep a flashlight by the bed.    Place nightlights in bathrooms and hallways.    Replace burned-out bulbs, or have someone replace them for you.  Preventing falls  To reduce your risk of falling:    Get out of bed or up from a chair slowly.    Wear low-heeled shoes that fit properly and have slip-resistant soles.    Remove throw rugs. Clear clutter from walkways.    Use handrails on stairs. Have handrails installed or adjusted if needed.    Install grab bars in the bathroom. Don't use towel racks for balance.    Use a shower stool. Also put adhesive strips in the shower or on the tub floor.  Going out  With a little time and preparation, you can get around safely.  Tips:    Bring a cane or walking aid if needed.    Give yourself plenty of time in case you start to get dizzy.    Ask your healthcare provider what type of exercise is safe for your condition.    Be patient. If an activity such as walking through a crowded shop causes you stress, you may not be ready for it yet.  Driving  If you become dizzy or disoriented while driving, you could hurt yourself and others. That's why it's best to not drive until symptoms have gone away. In some cases, your license may be temporarily held until it's safe for you to drive again.  For safety:    Ask a friend to drive for you.    Take public  transportation.    Walk to stores and other places when you can.  Asking for help  Don't be afraid to ask for help running errands, cooking meals, and doing exercise. Whether it's a friend, loved one, neighbor, or stranger on the street, a little help can make a world of difference.   Date Last Reviewed: 11/1/2016 2000-2017 [employer]. [address] [place] [address], [place] [address]. All rights reserved. This information is not intended as a substitute for professional medical care. Always follow your healthcare professional's instructions.          Anxiety Reaction  Anxiety is the feeling we all get when we think something bad might happen. It is a normal response to stress and usually causes only a mild reaction. When anxiety becomes more severe, it can interfere with daily life. In some cases, you may not even be aware of what it is you re anxious about. There may also be a genetic link or it may be a learned behavior in the home.  Both psychological and physical triggers cause stress reaction. It's often a response to fear or emotional stress, real or imagined. This stress may come from home, family, work, or social relationships.  During an anxiety reaction, you may feel:    Helpless    Nervous    Depressed    Irritable  Your body may show signs of anxiety in many ways. You may experience:    Dry mouth    Shakiness    Dizziness    Weakness    Trouble breathing    Breathing fast (hyperventilating)    Chest pressure    Sweating    Headache    Nausea    Diarrhea    Tiredness    Inability to sleep    Sexual problems  Home care    Try to locate the sources of stress in your life. They may not be obvious. These may include:  ? Daily hassles of life (such as traffic jams, missed appointments, or car troubles)  ? Major life changes, both good (new baby or job promotion) and bad (loss of job or loss of loved one)  ? Overload: feeling that you have too many responsibilities and can't take care of all of them  at once  ? Feeling helpless or feeling that your problems are beyond what you re able to solve    Notice how your body reacts to stress. Learn to listen to your body signals. This will help you take action before the stress becomes severe.    When you can, do something about the source of your stress. (Avoid hassles, limit the amount of change that happens in your life at one time and take a break when you feel overloaded).    Unfortunately, many stressful situations can't be avoided. It is necessary to learn how to better manage stress. There are many proven methods that will reduce your anxiety. These include simple things like exercise, good nutrition, and adequate rest. Also, there are certain techniques that are helpful:  ? Relaxation  ? Breathing exercises  ? Visualization  ? Biofeedback  ? Meditation  For more information about this, consult your healthcare provider or go to a local bookstore and review the many books and tapes available on this subject.  Follow-up care  If you feel that your anxiety is not responding to self-help measures, contact your healthcare provider or make an appointment with a counselor. You may need short-term psychological counseling and temporary medicine to help you manage stress.  Call 911  Call 911 if any of these happen:    Trouble breathing    Confusion    Drowsiness or trouble wakening    Fainting or loss of consciousness    Rapid heart rate    Seizure    New chest pain that becomes more severe, lasts longer, or spreads into your shoulder, arm, neck, jaw, or back  When to seek medical advice  Call your healthcare provider right away if any of these happen:    Your symptoms get worse    Severe headache not relieved by rest and mild pain reliever  Date Last Reviewed: 10/1/2017    1490-9510 [employer]. [address] [place] [address], [place] [address]. All rights reserved. This information is not intended as a substitute for professional medical care. Always follow your  healthcare professional's instructions.      Is very important that you follow-up with your primary care doctor in 2 days to make sure that you are getting better and to see if any other test of treatments will be needed if fevers nausea vomiting you pass out if weakness develops or any other concerns please return to emergency department immediately.  Should follow-up as already instructed      Please make an appointment to follow up with Neurology Clinic (phone: [phone]) as soon as possible even if entirely better.    Please make an appointment to follow up with Eye Clinic (phone: [phone]) as soon as possible even if entirely better.

## 2018-10-30 DIAGNOSIS — F41.9 ANXIETY: ICD-10-CM

## 2018-10-30 RX ORDER — CITALOPRAM HYDROBROMIDE 10 MG/1
TABLET ORAL
Qty: 30 TABLET | Refills: 0 | Status: SHIPPED | OUTPATIENT
Start: 2018-10-30 | End: 2019-09-06

## 2018-10-30 NOTE — TELEPHONE ENCOUNTER
"Requested Prescriptions   Pending Prescriptions Disp Refills     citalopram (CELEXA) 10 MG tablet [Pharmacy Med Name: CITALOPRAM HBR 10 MG TABLET]  Last Written Prescription Date:  9/11/2017  Last Fill Quantity: 30,  # refills: 11   Last office visit: 1/22/2018 with prescribing provider:  Dr GINNY Ricci  PHQ-9 SCORE 4/18/2016 10/6/2016 9/11/2017   Total Score - - -   Total Score 12 3 4     GEOVANY-7 SCORE 4/18/2016 10/6/2016 9/11/2017   Total Score 15 3 9          Future Office Visit:     30 tablet 0     Sig: TAKE 1 TABLET (10 MG) BY MOUTH DAILY    SSRIs Protocol Passed    10/30/2018  9:36 AM       Passed - Recent (12 mo) or future (30 days) visit within the authorizing provider's specialty    Patient had office visit in the last 12 months or has a visit in the next 30 days with authorizing provider or within the authorizing provider's specialty.  See \"Patient Info\" tab in inbasket, or \"Choose Columns\" in Meds & Orders section of the refill encounter.             Passed - Patient is age 18 or older          "

## 2018-10-30 NOTE — TELEPHONE ENCOUNTER
Patient called back and was advised to schedule appointment.Will refill medication until seen.Refill x 1 and appointment scheduled.  Medication is being filled for 1 time refill only due to:  Patient needs to be seen because it has been more than one year since last visit.

## 2019-03-05 ENCOUNTER — OFFICE VISIT (OUTPATIENT)
Dept: FAMILY MEDICINE | Facility: CLINIC | Age: 39
End: 2019-03-05
Payer: COMMERCIAL

## 2019-03-05 VITALS
TEMPERATURE: 97.3 F | RESPIRATION RATE: 16 BRPM | OXYGEN SATURATION: 97 % | WEIGHT: 205 LBS | SYSTOLIC BLOOD PRESSURE: 98 MMHG | HEIGHT: 69 IN | DIASTOLIC BLOOD PRESSURE: 64 MMHG | HEART RATE: 76 BPM | BODY MASS INDEX: 30.36 KG/M2

## 2019-03-05 DIAGNOSIS — B96.81 DUODENAL ULCER DUE TO HELICOBACTER PYLORI: Primary | ICD-10-CM

## 2019-03-05 DIAGNOSIS — K26.9 DUODENAL ULCER DUE TO HELICOBACTER PYLORI: Primary | ICD-10-CM

## 2019-03-05 PROCEDURE — 99214 OFFICE O/P EST MOD 30 MIN: CPT | Performed by: FAMILY MEDICINE

## 2019-03-05 RX ORDER — METRONIDAZOLE 500 MG/1
500 TABLET ORAL 3 TIMES DAILY
Qty: 28 TABLET | Refills: 0 | Status: SHIPPED | OUTPATIENT
Start: 2019-03-05 | End: 2019-09-06

## 2019-03-05 RX ORDER — AMOXICILLIN 500 MG/1
1000 CAPSULE ORAL 2 TIMES DAILY
Qty: 56 CAPSULE | Refills: 0 | Status: SHIPPED | OUTPATIENT
Start: 2019-03-05 | End: 2019-09-06

## 2019-03-05 ASSESSMENT — MIFFLIN-ST. JEOR: SCORE: 1832.31
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
## 2019-03-05 NOTE — PATIENT INSTRUCTIONS
Assessment: Lower back pain    Plan: do these exercises at least twice daily:  Standing or sitting exercise can be done every two hours    Yasmany' Flexion Versus Adilia   Extension Exercises For   Low Back Pain   Examples of Yasmany' Flexion Exercises  1. Pelvic tilt.  Please press the small of your back against the floor.  Start with 5-10  and increase to 100 count over one month   2. Single Knee to chest. Lie on your back with legs in bent position. Alternate one leg and the other very slowly bringing the knee to chest.  Start with a count of 5-10   Over one month work up to 100  3. Double knee to chest.  Lie on your back with knees in bent position.  Bring both knees to the chest slowly hold for a count of 5-to 10. Over one month work to 100  4. Partial sit-up or crunch.  Lie on your back in bent leg position.  Please bring your body with arms crossed in front  To 30 degrees of flexion. Start with 5-10 over one month work up to 100 or more  5. Sit back.  Please sit on the side of the bed or a stair landing  And lie backwards until the abdominal muscles start to quiver.  Hold for a count of 5-10 and over a month work up to 100.  5. Hamstring stretch.  Please extend your legs while sitting on the floor as tolerated for 5-10 count.  Gradually increase to count of 30 over one month      Alternately find a stair landing or sturdy chair and place heel  In a comfortable level of extension  And stretch one hamstring at a time for 5-10 seconds.  Increase to count of 30 over one month                         Squat.  Stand with legs comfortably apart and lower the body slowly by flexing the knees  for count of 5-10 over one month increase to 30.  Useful for anterior disc protrusion, facette joint arthritis spond-10ylolysis, spondylolisthesis and spinal stenosis    Adilia method or extension exercises  Useful disc bulging posteriorly  1. Prone pressups  Please lie on your abdomen.   Please do a push with the upper  half of your body only.  This should not cause the pain to shoot down your buttock thigh leg or foot  Start with 10 and repeat x 3 one minute apart.  Repeat x 10 every 1-2 hours  Pain tends to increase in the center of back  And leaves buttock thighs legs and foot over time  You may shift your pelvis in opposite direction of buttock and leg pain to achieve even better results  2. Superman with arms extended  Or at the side Simultaneously lift your arms and legs off the floor. Start with 5-10 over one month increase to 100.  3. Cat stretch or cobra Start  As if in extended position of prone pressup but hold the stretch for 5 or 10 count. Over one moth to count of 30.  4.  Extensions can be done in standing position  As well if prone pressup is inconvenient.  Shift your pelvis in opposite direction of limb pain.  Put your hands  Flat on your buttocks and lean backwards without loss of balance.  Count 10 x 3 time s then 10 extensions hourly     Aldo Ricci Jr., MD

## 2019-03-05 NOTE — LETTER
My Depression Action Plan  Name: Rodolfo Bennett   Date of Birth 1980  Date: 3/5/2019    My doctor: Aldo Ricci   My clinic: [address]  [facility] [address] [phone]          GREEN    ZONE   Good Control    What it looks like:     Things are going generally well. You have normal up s and down s. You may even feel depressed from time to time, but bad moods usually last less than a day.   What you need to do:  1. Continue to care for yourself (see self care plan)  2. Check your depression survival kit and update it as needed  3. Follow your physician s recommendations including any medication.  4. Do not stop taking medication unless you consult with your physician first.           YELLOW         ZONE Getting Worse    What it looks like:     Depression is starting to interfere with your life.     It may be hard to get out of bed; you may be starting to isolate yourself from others.    Symptoms of depression are starting to last most all day and this has happened for several days.     You may have suicidal thoughts but they are not constant.   What you need to do:     1. Call your care team, your response to treatment will improve if you keep your care team informed of your progress. Yellow periods are signs an adjustment may need to be made.     2. Continue your self-care, even if you have to fake it!    3. Talk to someone in your support network    4. Open up your depression survival kit           RED    ZONE Medical Alert - Get Help    What it looks like:     Depression is seriously interfering with your life.     You may experience these or other symptoms: You can t get out of bed most days, can t work or engage in other necessary activities, you have trouble taking care of basic hygiene, or basic responsibilities, thoughts of suicide or death that will not go away, self-injurious behavior.     What you need to  do:  1. Call your care team and request a same-day appointment. If they are not available (weekends or after hours) call your local crisis line, emergency room or 911.            Depression Self Care Plan / Survival Kit    Self-Care for Depression  Here s the deal. Your body and mind are really not as separate as most people think.  What you do and think affects how you feel and how you feel influences what you do and think. This means if you do things that people who feel good do, it will help you feel better.  Sometimes this is all it takes.  There is also a place for medication and therapy depending on how severe your depression is, so be sure to consult with your medical provider and/ or Behavioral Health Consultant if your symptoms are worsening or not improving.     In order to better manage my stress, I will:    Exercise  Get some form of exercise, every day. This will help reduce pain and release endorphins, the  feel good  chemicals in your brain. This is almost as good as taking antidepressants!  This is not the same as joining a gym and then never going! (they count on that by the way ) It can be as simple as just going for a walk or doing some gardening, anything that will get you moving.      Hygiene   Maintain good hygiene (Get out of bed in the morning, Make your bed, Brush your teeth, Take a shower, and Get dressed like you were going to work, even if you are unemployed).  If your clothes don't fit try to get ones that do.    Diet  I will strive to eat foods that are good for me, drink plenty of water, and avoid excessive sugar, caffeine, alcohol, and other mood-altering substances.  Some foods that are helpful in depression are: complex carbohydrates, B vitamins, flaxseed, fish or fish oil, fresh fruits and vegetables.    Psychotherapy  I agree to participate in Individual Therapy (if recommended).    Medication  If prescribed medications, I agree to take them.  Missing doses can result in serious  side effects.  I understand that drinking alcohol, or other illicit drug use, may cause potential side effects.  I will not stop my medication abruptly without first discussing it with my provider.    Staying Connected With Others  I will stay in touch with my friends, family members, and my primary care provider/team.    Use your imagination  Be creative.  We all have a creative side; it doesn t matter if it s oil painting, sand castles, or mud pies! This will also kick up the endorphins.    Witness Beauty  (AKA stop and smell the roses) Take a look outside, even in mid-winter. Notice colors, textures. Watch the squirrels and birds.     Service to others  Be of service to others.  There is always someone else in need.  By helping others we can  get out of ourselves  and remember the really important things.  This also provides opportunities for practicing all the other parts of the program.    Humor  Laugh and be silly!  Adjust your TV habits for less news and crime-drama and more comedy.    Control your stress  Try breathing deep, massage therapy, biofeedback, and meditation. Find time to relax each day.     My support system    Clinic Contact:  Phone number:    Contact 1:  Phone number:    Contact 2:  Phone number:    [employer]/:  Phone number:    Therapist:  Phone number:    Local crisis center:    Phone number:    Other community support:  Phone number:

## 2019-09-06 ENCOUNTER — OFFICE VISIT (OUTPATIENT)
Dept: FAMILY MEDICINE | Facility: CLINIC | Age: 39
End: 2019-09-06
Payer: COMMERCIAL

## 2019-09-06 VITALS
WEIGHT: 203 LBS | TEMPERATURE: 97.8 F | RESPIRATION RATE: 16 BRPM | HEIGHT: 68 IN | SYSTOLIC BLOOD PRESSURE: 110 MMHG | BODY MASS INDEX: 30.77 KG/M2 | DIASTOLIC BLOOD PRESSURE: 60 MMHG | HEART RATE: 68 BPM | OXYGEN SATURATION: 96 %

## 2019-09-06 DIAGNOSIS — F41.9 ANXIETY: ICD-10-CM

## 2019-09-06 PROCEDURE — 99214 OFFICE O/P EST MOD 30 MIN: CPT | Performed by: FAMILY MEDICINE

## 2019-09-06 RX ORDER — CITALOPRAM HYDROBROMIDE 10 MG/1
10 TABLET ORAL DAILY
Qty: 30 TABLET | Refills: 5 | Status: SHIPPED | OUTPATIENT
Start: 2019-09-06 | End: 2020-09-30

## 2019-09-06 ASSESSMENT — PATIENT HEALTH QUESTIONNAIRE - PHQ9
5. POOR APPETITE OR OVEREATING: NOT AT ALL
SUM OF ALL RESPONSES TO PHQ QUESTIONS 1-9: 1

## 2019-09-06 ASSESSMENT — ANXIETY QUESTIONNAIRES
2. NOT BEING ABLE TO STOP OR CONTROL WORRYING: NOT AT ALL
7. FEELING AFRAID AS IF SOMETHING AWFUL MIGHT HAPPEN: NOT AT ALL
3. WORRYING TOO MUCH ABOUT DIFFERENT THINGS: MORE THAN HALF THE DAYS
IF YOU CHECKED OFF ANY PROBLEMS ON THIS QUESTIONNAIRE, HOW DIFFICULT HAVE THESE PROBLEMS MADE IT FOR YOU TO DO YOUR WORK, TAKE CARE OF THINGS AT HOME, OR GET ALONG WITH OTHER PEOPLE: SOMEWHAT DIFFICULT
6. BECOMING EASILY ANNOYED OR IRRITABLE: NOT AT ALL
1. FEELING NERVOUS, ANXIOUS, OR ON EDGE: NOT AT ALL
GAD7 TOTAL SCORE: 2
5. BEING SO RESTLESS THAT IT IS HARD TO SIT STILL: NOT AT ALL

## 2019-09-06 ASSESSMENT — MIFFLIN-ST. JEOR: SCORE: 1810.3

## 2019-09-06 NOTE — PROGRESS NOTES
Subjective     Rodolfo Bennett is a 39 year old male who presents to clinic today for the following health issues:    HPI   Abnormal Mood Symptoms  Symptoms usually only happen once a year  Onset: ongoing    Description:   Depression: YES  Anxiety: YES    Accompanying Signs & Symptoms:  Still participating in activities that you used to enjoy: no  Fatigue: no  Irritability: no  Difficulty concentrating: no  Changes in appetite: no  Problems with sleep: YES  Heart racing/beating fast : no  Thoughts of hurting yourself or others: none  Does get headaches in the temples    History:   Recent stress: YES- kids just started school  Prior depression hospitalization: None  Family history of depression: no  Family history of anxiety: no    Precipitating factors:   Alcohol/drug use: no    Alleviating factors:  celexa    Therapies Tried and outcome: Celexa (Citalopram)  10 mg daily at the onset of his depression symptoms  Seasonal affective disorder mid-to-late October\    Patient comes in because he wants anticipate symptoms this year  Not having many symptoms present time objective findings PHQ 9 is only 1 or 2  Assessment seasonal affective disorder  Plan treatment citalopram 10 mg starting now  Side effects with benefits and risks of treatment thoroughly discussed I suggested counseling he was reluctant to do that  Plan side effects benefits benefits and risk of treatment plan thoroughly discussed could develop tremors and shakes also suggested trying phototherapy using valerian when feeling anxious instead of taking this was reluctant to do that  .  REVIEW OF SYSTEMS   Review Of Systems  Skin: negative  Eyes: negative  Ears/Nose/Throat: negative  Respiratory: No shortness of breath, dyspnea on exertion, cough, or hemoptysis  Cardiovascular: negative  Gastrointestinal: negative history of H. pylori  Genitourinary: negative  Musculoskeletal: negative  Neurologic: negative  Psychiatric: anxiety, depression and likely seasonal  affective disorder  Hematologic/Lymphatic/Immunologic: negative  Endocrine: negative         There are no preventive care reminders to display for this patient.          .  Current Outpatient Medications   Medication Sig Dispense Refill     citalopram (CELEXA) 10 MG tablet Take 1 tablet (10 mg) by mouth daily 30 tablet 5              Allergies   Allergen Reactions     Dust Mites                  There is no immunization history on file for this patient.          reports that he does not drink alcohol.          reports that he does not use drugs.        family history includes Diabetes in his mother; Unknown/Adopted in his father.        indicated that his mother is alive. He indicated that his father is .             has no past surgical history on file.         reports that he currently engages in sexual activity and has had partners who are Female.    .  Pediatric History   Patient Guardian Status     Mother:  Angle Bennett     Other Topics Concern     Parent/sibling w/ CABG, MI or angioplasty before 65F 55M? No   Social History Narrative     Not on file               reports that he has never smoked. He has never used smokeless tobacco.        Medical, social, surgical, and family histories reviewed.        Labs reviewed in EPIC  Patient Active Problem List   Diagnosis     Hypertrophy of breast     Positive PPD     Abdominal tenderness, epigastric     Anxiety state     Depressive disorder, not elsewhere classified     Closed fracture of shaft of radius (alone)     H. pylori infection     Non morbid obesity due to excess calories         History reviewed. No pertinent surgical history.    Social History     Tobacco Use     Smoking status: Never Smoker     Smokeless tobacco: Never Used   Substance Use Topics     Alcohol use: No       Family History   Problem Relation Age of Onset     Diabetes Mother      Unknown/Adopted Father              Current Outpatient Medications   Medication Sig Dispense Refill      "citalopram (CELEXA) 10 MG tablet Take 1 tablet (10 mg) by mouth daily 30 tablet 5           Recent Labs   Lab Test 08/12/18  1722 10/09/15  1212 06/25/15  1456 08/25/14  1207 10/07/13  1530   LDL  --  118  --   --   --    HDL  --  43  --   --   --    TRIG  --  66  --   --   --    ALT 37  --   --   --  51   CR 0.82  --  0.79 1.20 1.10   GFRESTIMATED >90  --  >90  Non  GFR Calc   69 77   GFRESTBLACK >90  --  >90   GFR Calc   84 >90   POTASSIUM 3.7  --  3.6 3.7 4.1   TSH  --  0.41  --  0.60 0.73            BP Readings from Last 6 Encounters:   09/06/19 110/60   03/05/19 98/64   08/12/18 101/71   01/22/18 108/64   09/11/17 108/62   10/06/16 102/62           Wt Readings from Last 3 Encounters:   09/06/19 92.1 kg (203 lb)   03/05/19 93 kg (205 lb)   08/12/18 91.8 kg (202 lb 5 oz)                 Positive symptoms or findings indicated by bold designation:         ROS: 10 point ROS neg other than the symptoms noted above in the HPI.except  has Hypertrophy of breast; Positive PPD; Abdominal tenderness, epigastric; Anxiety state; Depressive disorder, not elsewhere classified; Closed fracture of shaft of radius (alone); H. pylori infection; and Non morbid obesity due to excess calories on their problem list.  Review Of Systems    See ROS above            PE:  /60   Pulse 68   Temp 97.8  F (36.6  C) (Tympanic)   Resp 16   Ht 1.727 m (5' 8\")   Wt 92.1 kg (203 lb)   SpO2 96%   BMI 30.87 kg/m   Body mass index is 30.87 kg/m .        Constitutional: general appearance, well nourished, well developed, in no acute distress, well developed, appears stated age, normal body habitus,          Eyes:; The patient has normal eyelids sclerae and conjunctivae :          Ears/Nose/Throat: external ear, overall: normal appearance; external nose, overall: benign appearance, normal moujth gums and lips            Neck: thyroid, overall: normal size, normal consistency, nontender,          Respiratory: "  palpation of chest, overall: normal excursion,    Clear to percussion and auscultation     NO Tachypnea    NORMAL  Color          Cardiovascular:  Good color with no peripheral edema    Regular sinus rhythm without murmur.  Physiologic heart sounds   Heart is unelarged    .     Chest/Breast: normal shape           Abdominal exam,  Liver and spleen are  unenlarged        Tenderness    Scars              Urogenital; no renal, flank or bladder  tenderness;          Lymphatic: neck nodes,     Other nodes         Musculoskeletal:  Brief ortho exam normal except:           Integument: inspection of skin, no rash, lesions; and, palpation, no induration, no tenderness.          Neurologic mental status, overall: alert and oriented; gait, no ataxia, no unsteadiness; coordination, no tremors; cranial nerves, overall: normal motor, overall: normal bulk, tone.          Psychiatric: orientation/consciousness, overall: oriented to person, place and time; behavior/psychomotor activity, no tics, normal psychomotor activity; mood and affect, overall: normal mood and affect; appearance, overall: well-groomed, good eye contact; speech, overall: normal quality, no aphasia and normal quality, quantity, intact.        Diagnostic Test Results:  Results for orders placed or performed during the hospital encounter of 08/12/18   XR Chest Port 1 View    Narrative    Exam: XR CHEST PORT 1 VW, 8/12/2018 7:06 PM    Indication: Near syncope;     Comparison: None    Findings:   Frontal view x-ray of the chest. No pneumothorax or significant  pleural effusion. Mediastinal silhouette is within normal limits for  inspiration. No acute airspace opacity. Mild vascular and interstitial  prominence is likely related to underaeration. Visualized upper  abdomen osseous structures are unremarkable.      Impression    Impression: No acute airspace opacity.    I have personally reviewed the examination and initial interpretation  and I agree with the  findings.    DINESH SMITH MD   CT Head w/o Contrast    Narrative    CT HEAD W/O CONTRAST 8/12/2018 6:00 PM    Provided History: Presyncope;     Comparison: No similar studies.    Technique: Using multidetector thin collimation helical acquisition  technique, axial, coronal and sagittal CT images from the skull base  to the vertex were obtained without intravenous contrast.     Findings:    No intracranial hemorrhage, mass effect, or midline shift. The  ventricles are proportionate to the cerebral sulci. The gray to white  matter differentiation of the cerebral hemispheres is preserved. The  basal cisterns are patent. Subtle density within the anteromedial left  caudate nucleus likely represents the anterior caudate vein.    The visualized paranasal sinuses are clear. The mastoid air cells are  clear.       Impression    Impression: No acute intracranial pathology suspected.     I have personally reviewed the examination and initial interpretation  and I agree with the findings.    ODILON PERKINS MD   US Carotid Bilateral    Narrative    EXAMINATION: US CAROTID BILATERAL, 8/12/2018 6:43 PM     COMPARISON: None.    HISTORY: Near syncope    TECHNIQUE:  Grayscale (B-mode) and duplex and spectral Doppler  ultrasound of the extracranial internal carotid, external carotid,  vertebral artery origins, right brachiocephalic/subclavian and left  subclavian arteries. Velocity measurements obtained with angle  correction at or less than 60 degrees.    Findings:    Right side:     Plaque Morphology: None.       Proximal CCA: 80/16 cm/sec     Mid CCA: 82/18 cm/sec     Distal CCA: 82/23 cm/sec     Carotid bifurcation: Not visualized     External CA: 71 cm/sec       Proximal ICA: 41/19 cm/sec     Mid ICA: 40/17 cm/sec     Distal ICA: 57/27 cm/sec       Vert: antegrade, 51/cm/sec          ICA/CCA ratio: 0.69     Left side:     Plaque Morphology: None.       Proximal CCA: 81/14 cm/sec     Mid CCA: 72/18 cm/sec     Distal CCA: 85/27  cm/sec     External CA: 64 cm/sec       Proximal ICA: 66/20 cm/sec     Mid ICA: 63/25 cm/sec     Distal ICA: 57/27 cm/sec       Vertebral Artery: antegrade, 68 cm/sec     ICA/CCA ratio: 0.77       Impression    Impression:  1. Right side:      Degree of stenosis: Normal.    2. Left side:       Degree of stenosis: Normal.        Consensus Panel Gray-Scale and Doppler US Criteria for Diagnosis of  ICA Stenosis (Radiology 11/2003) additionally modified by Hayden et  al. in Journal of Vascular Surgery 1/2011, (02)53-80.       Normal         ICA PSV < 140 cm/sec       Plaque Estimate None       ICA/CCA  PSV Ratio < 2.0       ICA EDV < 40 cm/sec       < 50%          ICA PSV < 140 cm/sec       Plaque Estimate < 50%       ICA/CCA  PSV Ratio < 2.0       ICA EDV < 40 cm/sec       50- 69%       ICA -230 cm/sec       Plaque Estimate > or = 50%       ICA/CCA PSV Ratio 2.0-4.0       ICA EDV  cm/sec         > or = 70%, less than near occlusion       ICA PSV > 230 cm/sec       Plaque Estimate > or = 50%       ICA/CCA Ratio > 4.0       ICA EDV > 100 cm/sec                                            Additional criteria from vascular surgery     > 80%       EDV > 120 cm/sec     I have personally reviewed the examination and initial interpretation  and I agree with the findings.    DINESH SMITH MD   CBC with platelets differential   Result Value Ref Range    WBC 6.2 4.0 - 11.0 10e9/L    RBC Count 4.74 4.4 - 5.9 10e12/L    Hemoglobin 15.2 13.3 - 17.7 g/dL    Hematocrit 43.4 40.0 - 53.0 %    MCV 92 78 - 100 fl    MCH 32.1 26.5 - 33.0 pg    MCHC 35.0 31.5 - 36.5 g/dL    RDW 13.1 10.0 - 15.0 %    Platelet Count 246 150 - 450 10e9/L    Diff Method Automated Method     % Neutrophils 51.4 %    % Lymphocytes 37.1 %    % Monocytes 7.5 %    % Eosinophils 3.5 %    % Basophils 0.3 %    % Immature Granulocytes 0.2 %    Nucleated RBCs 0 0 /100    Absolute Neutrophil 3.2 1.6 - 8.3 10e9/L    Absolute Lymphocytes 2.3 0.8 - 5.3 10e9/L     Absolute Monocytes 0.5 0.0 - 1.3 10e9/L    Absolute Eosinophils 0.2 0.0 - 0.7 10e9/L    Absolute Basophils 0.0 0.0 - 0.2 10e9/L    Abs Immature Granulocytes 0.0 0 - 0.4 10e9/L    Absolute Nucleated RBC 0.0    Comprehensive metabolic panel   Result Value Ref Range    Sodium 139 133 - 144 mmol/L    Potassium 3.7 3.4 - 5.3 mmol/L    Chloride 105 94 - 109 mmol/L    Carbon Dioxide 29 20 - 32 mmol/L    Anion Gap 4 3 - 14 mmol/L    Glucose 102 (H) 70 - 99 mg/dL    Urea Nitrogen 15 7 - 30 mg/dL    Creatinine 0.82 0.66 - 1.25 mg/dL    GFR Estimate >90 >60 mL/min/1.7m2    GFR Estimate If Black >90 >60 mL/min/1.7m2    Calcium 8.5 8.5 - 10.1 mg/dL    Bilirubin Total 0.3 0.2 - 1.3 mg/dL    Albumin 3.6 3.4 - 5.0 g/dL    Protein Total 7.1 6.8 - 8.8 g/dL    Alkaline Phosphatase 59 40 - 150 U/L    ALT 37 0 - 70 U/L    AST 17 0 - 45 U/L   Troponin I   Result Value Ref Range    Troponin I ES <0.015 0.000 - 0.045 ug/L   Magnesium   Result Value Ref Range    Magnesium 1.9 1.6 - 2.3 mg/dL   EKG 12 lead   Result Value Ref Range    Interpretation ECG Click View Image link to view waveform and result            ICD-10-CM    1. Anxiety F41.9 citalopram (CELEXA) 10 MG tablet              .    Side effects benefits and risks thoroughly discussed. .he may come in early if unimproved or getting worse          Please drink 2 glasses of water prior to meals and walk 15-30 minutes after meals        I spent 25 MINUTES SPENT  with patient discussing the following issues   The encounter diagnosis was Anxiety. over half of which involved counseling and coordination of care.      Patient Instructions   (F41.9) Anxiety  Comment:    Plan: citalopram (CELEXA) 10 MG tablet  START October 1ST   EXERCISE   ARTIFICIAL SUNLIGHT  5AM 10 AM 5PM -8PM  VALERIAN TEA HERBAL TEA AT HOUR OF SLEEP   SEASONAL AFFECTIVE DISORDER                          ALL THE ABOVE PROBLEMS ARE STABLE AND MED CHANGES AS NOTED        Diet: MEDITERRANEAN DIET         Exercise:  AS  TOLERATED AEROBIC RECOMMENDED   Exercises Range of motion, balance, isometric, and strengthening exercises 30 repetitions twice daily of involved joints            .MARTY KARIMI MD 9/6/2019 5:13 PM  September 6, 2019

## 2019-09-06 NOTE — PATIENT INSTRUCTIONS
(F41.9) Anxiety  Comment:    Plan: citalopram (CELEXA) 10 MG tablet  START October 1ST   EXERCISE   ARTIFICIAL SUNLIGHT  5AM 10 AM 5PM -8PM  VALERIAN TEA HERBAL TEA AT HOUR OF SLEEP   SEASONAL AFFECTIVE DISORDER

## 2019-09-07 ASSESSMENT — ANXIETY QUESTIONNAIRES: GAD7 TOTAL SCORE: 2

## 2020-07-03 ENCOUNTER — HOSPITAL ENCOUNTER (EMERGENCY)
Facility: CLINIC | Age: 40
Discharge: HOME | End: 2020-07-03
Attending: EMERGENCY MEDICINE | Admitting: EMERGENCY MEDICINE
Payer: COMMERCIAL

## 2020-07-03 ENCOUNTER — APPOINTMENT (OUTPATIENT)
Dept: GENERAL RADIOLOGY | Facility: CLINIC | Age: 40
End: 2020-07-03
Attending: EMERGENCY MEDICINE
Payer: COMMERCIAL

## 2020-07-03 VITALS
DIASTOLIC BLOOD PRESSURE: 69 MMHG | BODY MASS INDEX: 28.79 KG/M2 | HEART RATE: 80 BPM | RESPIRATION RATE: 16 BRPM | WEIGHT: 190 LBS | TEMPERATURE: 98 F | SYSTOLIC BLOOD PRESSURE: 103 MMHG | OXYGEN SATURATION: 99 % | HEIGHT: 68 IN

## 2020-07-03 DIAGNOSIS — M25.561 ACUTE PAIN OF RIGHT KNEE: ICD-10-CM

## 2020-07-03 PROCEDURE — 99283 EMERGENCY DEPT VISIT LOW MDM: CPT

## 2020-07-03 PROCEDURE — 99283 EMERGENCY DEPT VISIT LOW MDM: CPT | Mod: Z6 | Performed by: EMERGENCY MEDICINE

## 2020-07-03 PROCEDURE — 73560 X-RAY EXAM OF KNEE 1 OR 2: CPT | Mod: RT

## 2020-07-03 ASSESSMENT — MIFFLIN-ST. JEOR: SCORE: 1746.33

## 2020-07-03 NOTE — ED AVS SNAPSHOT
[facility], [place], Emergency Department  [address] [place] [address]  Phone:  [phone]                                    Rodolfo Bennett   MRN: [id number]    Department:  [facility], Emergency Department   Date of Visit:  7/3/2020           After Visit Summary Signature Page    I have received my discharge instructions, and my questions have been answered. I have discussed any challenges I see with this plan with the nurse or doctor.    ..........................................................................................................................................  Patient/Patient Representative Signature      ..........................................................................................................................................  Patient Representative Print Name and Relationship to Patient    ..................................................               ................................................  Date                                   Time    ..........................................................................................................................................  Reviewed by Signature/Title    ...................................................              ..............................................  Date                                               Time          22EPIC Rev 08/18

## 2020-07-04 NOTE — DISCHARGE INSTRUCTIONS
Please make an appointment to follow up with Your Primary Care Provider in 7-10 days unless symptoms completely resolve.    Take 600 mg ibuprofen every 8 hours, for pain and inflammation.  Take this on schedule, for approximately 1 week or until resolution of  symptoms.  Take this medication with food to prevent stomach upset.  If you taking a chronic antacid medication, make sure to take this while you are taking this medication.    Rest, avoid repetitive motion, and lifting over 5 pounds with the effected extremity.  May use ice or heating pad to help with the pain.  Elevate the affected extremity above the level of your heart to help with swelling.    Follow-up with her primary care doctor in 1-2 weeks if your symptoms have not resolved. Return to the emergency department if you develop worsening pain, weakness, numbness or tingling, discoloration of your extremity.

## 2020-07-04 NOTE — ED TRIAGE NOTES
Patient presents ambulatory to triage c/o right knee pain starting last night. Patient states he is able to walk fine, but it is painful when he is at rest. Patient denies any recent fall or trauma.

## 2020-08-15 ENCOUNTER — HOSPITAL ENCOUNTER (EMERGENCY)
Facility: CLINIC | Age: 40
Discharge: HOME | End: 2020-08-15
Attending: EMERGENCY MEDICINE | Admitting: EMERGENCY MEDICINE
Payer: COMMERCIAL

## 2020-08-15 VITALS
DIASTOLIC BLOOD PRESSURE: 74 MMHG | BODY MASS INDEX: 29.7 KG/M2 | RESPIRATION RATE: 18 BRPM | OXYGEN SATURATION: 96 % | TEMPERATURE: 98.3 F | HEIGHT: 68 IN | HEART RATE: 83 BPM | WEIGHT: 196 LBS | SYSTOLIC BLOOD PRESSURE: 108 MMHG

## 2020-08-15 DIAGNOSIS — R10.13 ABDOMINAL PAIN, EPIGASTRIC: ICD-10-CM

## 2020-08-15 DIAGNOSIS — R11.2 NON-INTRACTABLE VOMITING WITH NAUSEA, UNSPECIFIED VOMITING TYPE: ICD-10-CM

## 2020-08-15 LAB
ALBUMIN SERPL-MCNC: 3.6 G/DL (ref 3.4–5)
ALP SERPL-CCNC: 52 U/L (ref 40–150)
ALT SERPL W P-5'-P-CCNC: 86 U/L (ref 0–70)
ANION GAP SERPL CALCULATED.3IONS-SCNC: 5 MMOL/L (ref 3–14)
AST SERPL W P-5'-P-CCNC: 56 U/L (ref 0–45)
BASOPHILS # BLD AUTO: 0 10E9/L (ref 0–0.2)
BASOPHILS NFR BLD AUTO: 0.2 %
BILIRUB SERPL-MCNC: 0.3 MG/DL (ref 0.2–1.3)
BUN SERPL-MCNC: 10 MG/DL (ref 7–30)
CALCIUM SERPL-MCNC: 8.5 MG/DL (ref 8.5–10.1)
CHLORIDE SERPL-SCNC: 107 MMOL/L (ref 94–109)
CO2 SERPL-SCNC: 27 MMOL/L (ref 20–32)
CREAT SERPL-MCNC: 0.89 MG/DL (ref 0.66–1.25)
DIFFERENTIAL METHOD BLD: NORMAL
EOSINOPHIL # BLD AUTO: 0 10E9/L (ref 0–0.7)
EOSINOPHIL NFR BLD AUTO: 0 %
ERYTHROCYTE [DISTWIDTH] IN BLOOD BY AUTOMATED COUNT: 12.7 % (ref 10–15)
GFR SERPL CREATININE-BSD FRML MDRD: >90 ML/MIN/{1.73_M2}
GLUCOSE SERPL-MCNC: 136 MG/DL (ref 70–99)
HCT VFR BLD AUTO: 47 % (ref 40–53)
HGB BLD-MCNC: 16 G/DL (ref 13.3–17.7)
IMM GRANULOCYTES # BLD: 0 10E9/L (ref 0–0.4)
IMM GRANULOCYTES NFR BLD: 0.2 %
INTERPRETATION ECG - MUSE: NORMAL
LIPASE SERPL-CCNC: 301 U/L (ref 73–393)
LYMPHOCYTES # BLD AUTO: 1 10E9/L (ref 0.8–5.3)
LYMPHOCYTES NFR BLD AUTO: 19.6 %
MCH RBC QN AUTO: 30.7 PG (ref 26.5–33)
MCHC RBC AUTO-ENTMCNC: 34 G/DL (ref 31.5–36.5)
MCV RBC AUTO: 90 FL (ref 78–100)
MONOCYTES # BLD AUTO: 0.6 10E9/L (ref 0–1.3)
MONOCYTES NFR BLD AUTO: 11.6 %
NEUTROPHILS # BLD AUTO: 3.5 10E9/L (ref 1.6–8.3)
NEUTROPHILS NFR BLD AUTO: 68.4 %
NRBC # BLD AUTO: 0 10*3/UL
NRBC BLD AUTO-RTO: 0 /100
PLATELET # BLD AUTO: 234 10E9/L (ref 150–450)
POTASSIUM SERPL-SCNC: 3.9 MMOL/L (ref 3.4–5.3)
PROT SERPL-MCNC: 8.2 G/DL (ref 6.8–8.8)
RBC # BLD AUTO: 5.22 10E12/L (ref 4.4–5.9)
SODIUM SERPL-SCNC: 138 MMOL/L (ref 133–144)
TROPONIN I SERPL-MCNC: <0.015 UG/L (ref 0–0.04)
WBC # BLD AUTO: 5.1 10E9/L (ref 4–11)

## 2020-08-15 PROCEDURE — 99284 EMERGENCY DEPT VISIT MOD MDM: CPT | Mod: 25 | Performed by: EMERGENCY MEDICINE

## 2020-08-15 PROCEDURE — 96376 TX/PRO/DX INJ SAME DRUG ADON: CPT | Performed by: EMERGENCY MEDICINE

## 2020-08-15 PROCEDURE — 25000132 ZZH RX MED GY IP 250 OP 250 PS 637: Performed by: EMERGENCY MEDICINE

## 2020-08-15 PROCEDURE — 80053 COMPREHEN METABOLIC PANEL: CPT | Performed by: EMERGENCY MEDICINE

## 2020-08-15 PROCEDURE — 25000125 ZZHC RX 250: Performed by: EMERGENCY MEDICINE

## 2020-08-15 PROCEDURE — 25000128 H RX IP 250 OP 636: Performed by: EMERGENCY MEDICINE

## 2020-08-15 PROCEDURE — 84484 ASSAY OF TROPONIN QUANT: CPT | Performed by: EMERGENCY MEDICINE

## 2020-08-15 PROCEDURE — 85025 COMPLETE CBC W/AUTO DIFF WBC: CPT | Performed by: EMERGENCY MEDICINE

## 2020-08-15 PROCEDURE — 99285 EMERGENCY DEPT VISIT HI MDM: CPT | Mod: 25 | Performed by: EMERGENCY MEDICINE

## 2020-08-15 PROCEDURE — 25800030 ZZH RX IP 258 OP 636: Performed by: EMERGENCY MEDICINE

## 2020-08-15 PROCEDURE — 93005 ELECTROCARDIOGRAM TRACING: CPT | Performed by: EMERGENCY MEDICINE

## 2020-08-15 PROCEDURE — 96365 THER/PROPH/DIAG IV INF INIT: CPT | Performed by: EMERGENCY MEDICINE

## 2020-08-15 PROCEDURE — 83690 ASSAY OF LIPASE: CPT | Performed by: EMERGENCY MEDICINE

## 2020-08-15 PROCEDURE — 93010 ELECTROCARDIOGRAM REPORT: CPT | Mod: Z6 | Performed by: EMERGENCY MEDICINE

## 2020-08-15 PROCEDURE — 96361 HYDRATE IV INFUSION ADD-ON: CPT | Performed by: EMERGENCY MEDICINE

## 2020-08-15 RX ORDER — FAMOTIDINE 20 MG/1
20 TABLET, FILM COATED ORAL 2 TIMES DAILY
Qty: 14 TABLET | Refills: 0 | Status: SHIPPED | OUTPATIENT
Start: 2020-08-15 | End: 2020-08-22

## 2020-08-15 RX ORDER — SODIUM CHLORIDE 9 MG/ML
INJECTION, SOLUTION INTRAVENOUS CONTINUOUS
Status: DISCONTINUED | OUTPATIENT
Start: 2020-08-15 | End: 2020-08-15 | Stop reason: HOSPADM

## 2020-08-15 RX ORDER — ACETAMINOPHEN 325 MG/1
650 TABLET ORAL ONCE
Status: COMPLETED | OUTPATIENT
Start: 2020-08-15 | End: 2020-08-15

## 2020-08-15 RX ORDER — ONDANSETRON 2 MG/ML
4 INJECTION INTRAMUSCULAR; INTRAVENOUS EVERY 30 MIN PRN
Status: DISCONTINUED | OUTPATIENT
Start: 2020-08-15 | End: 2020-08-15 | Stop reason: HOSPADM

## 2020-08-15 RX ADMIN — LIDOCAINE HYDROCHLORIDE 30 ML: 20 SOLUTION ORAL; TOPICAL at 02:16

## 2020-08-15 RX ADMIN — FAMOTIDINE 20 MG: 20 INJECTION, SOLUTION INTRAVENOUS at 02:16

## 2020-08-15 RX ADMIN — ONDANSETRON 4 MG: 2 INJECTION INTRAMUSCULAR; INTRAVENOUS at 01:59

## 2020-08-15 RX ADMIN — SODIUM CHLORIDE 1000 ML: 9 INJECTION, SOLUTION INTRAVENOUS at 01:59

## 2020-08-15 RX ADMIN — ACETAMINOPHEN 650 MG: 325 TABLET, FILM COATED ORAL at 05:14

## 2020-08-15 ASSESSMENT — MIFFLIN-ST. JEOR: SCORE: 1773.55

## 2020-08-15 NOTE — ED PROVIDER NOTES
"ED Provider Note  [facility]      History     Chief Complaint   Patient presents with     Vomiting     HPI  Rodolfo Bennett is a 40 year old male who with history of anxiety who presents with 1 day of worsening epigastric abdominal pain which is intermittent.  Describes as a sharp pain which is followed by an unusual sensation of hunger.  Tonight associated with 4 episodes of vomiting, anxiety and subsequently dry heaves.  No diarrhea.  Pain in his abdomen has been radiating into his chest.  Feels some burning in his mouth.    No shortness of breath.  No trauma.  Mother with COVID 3 weeks ago now recovered.  He has had no cough or fever and has been breathing fine.    Past Medical History  Past Medical History:   Diagnosis Date     Allergic state      Anxiety      No past surgical history on file.  famotidine (PEPCID) 20 MG tablet  citalopram (CELEXA) 10 MG tablet      Allergies   Allergen Reactions     Dust Mites      Past medical history, past surgical history, medications, and allergies were reviewed with the patient. Additional pertinent items: None    Family History  Family History   Problem Relation Age of Onset     Diabetes Mother      Unknown/Adopted Father      Family history was reviewed with the patient. Additional pertinent items: None    Social History  Social History     Tobacco Use     Smoking status: Never Smoker     Smokeless tobacco: Never Used   Substance Use Topics     Alcohol use: No     Drug use: No      Social history was reviewed with the patient. Additional pertinent items: None    Review of Systems  A complete review of systems was performed with pertinent positives and negatives noted in the HPI, and all other systems negative.    Physical Exam   BP: 115/78  Pulse: 88  Temp: 98.3  F (36.8  C)  Resp: 16  Height: 172.7 cm (5' 8\")  Weight: 88.9 kg (196 lb)  SpO2: 95 %  Physical Exam  GEN: Anxious but otherwise well-appearing, non toxic, cooperative and conversant. "   HEENT: The head is normocephalic and atraumatic. Pupils are equal round and reactive to light. Extraocular motions are intact. There is no facial swelling. The neck is nontender and supple.   CV: Regular rate and rhythm  2+ radial pulses bilaterally.  PULM: Unlabored breathing   ABD: Soft, nontender, nondistended.   EXT: Full range of motion.  No edema.  NEURO: Cranial nerves II through XII are intact and symmetric. Bilateral upper and lower extremities grossly show full range of motion without any focal deficits.   SKIN: No rashes, ecchymosis, or lacerations  PSYCH: Calm and cooperative, interactive.     ED Course      Procedures          EKG at 0215.    anxious at time of ECG.  ECG interpretted by me within 10 minutes of production  NSR at 75 bpm. Normal axis.  Normal intervals. Nl R-wave progress. No ST-T elevations or depressions. Pathologic T-wave inversion are absent.  T wave inversion lead III, ST-T flattening in V4 through V6.    Interpretation: Abnormal ECG       Results for orders placed or performed during the hospital encounter of 08/15/20   CBC with platelets differential     Status: None   Result Value Ref Range    WBC 5.1 4.0 - 11.0 10e9/L    RBC Count 5.22 4.4 - 5.9 10e12/L    Hemoglobin 16.0 13.3 - 17.7 g/dL    Hematocrit 47.0 40.0 - 53.0 %    MCV 90 78 - 100 fl    MCH 30.7 26.5 - 33.0 pg    MCHC 34.0 31.5 - 36.5 g/dL    RDW 12.7 10.0 - 15.0 %    Platelet Count 234 150 - 450 10e9/L    Diff Method Automated Method     % Neutrophils 68.4 %    % Lymphocytes 19.6 %    % Monocytes 11.6 %    % Eosinophils 0.0 %    % Basophils 0.2 %    % Immature Granulocytes 0.2 %    Nucleated RBCs 0 0 /100    Absolute Neutrophil 3.5 1.6 - 8.3 10e9/L    Absolute Lymphocytes 1.0 0.8 - 5.3 10e9/L    Absolute Monocytes 0.6 0.0 - 1.3 10e9/L    Absolute Eosinophils 0.0 0.0 - 0.7 10e9/L    Absolute Basophils 0.0 0.0 - 0.2 10e9/L    Abs Immature Granulocytes 0.0 0 - 0.4 10e9/L    Absolute Nucleated RBC 0.0    Comprehensive  metabolic panel     Status: Abnormal   Result Value Ref Range    Sodium 138 133 - 144 mmol/L    Potassium 3.9 3.4 - 5.3 mmol/L    Chloride 107 94 - 109 mmol/L    Carbon Dioxide 27 20 - 32 mmol/L    Anion Gap 5 3 - 14 mmol/L    Glucose 136 (H) 70 - 99 mg/dL    Urea Nitrogen 10 7 - 30 mg/dL    Creatinine 0.89 0.66 - 1.25 mg/dL    GFR Estimate >90 >60 mL/min/[1.73_m2]    GFR Estimate If Black >90 >60 mL/min/[1.73_m2]    Calcium 8.5 8.5 - 10.1 mg/dL    Bilirubin Total 0.3 0.2 - 1.3 mg/dL    Albumin 3.6 3.4 - 5.0 g/dL    Protein Total 8.2 6.8 - 8.8 g/dL    Alkaline Phosphatase 52 40 - 150 U/L    ALT 86 (H) 0 - 70 U/L    AST 56 (H) 0 - 45 U/L   Troponin I     Status: None   Result Value Ref Range    Troponin I ES <0.015 0.000 - 0.045 ug/L   Lipase     Status: None   Result Value Ref Range    Lipase 301 73 - 393 U/L   EKG 12-lead, tracing only     Status: None (Preliminary result)   Result Value Ref Range    Interpretation ECG Click View Image link to view waveform and result      Medications   0.9% sodium chloride BOLUS (0 mLs Intravenous Stopped 8/15/20 0436)     Followed by   sodium chloride 0.9% infusion ( Intravenous Canceled Entry 8/15/20 0436)   ondansetron (ZOFRAN) injection 4 mg (4 mg Intravenous Given 8/15/20 0159)   famotidine (PEPCID) infusion 20 mg (0 mg Intravenous Stopped 8/15/20 0248)   lidocaine (XYLOCAINE) 2 % 15 mL, alum & mag hydroxide-simethicone (MAALOX  ES) 15 mL GI Cocktail (30 mLs Oral Given 8/15/20 0216)   acetaminophen (TYLENOL) tablet 650 mg (650 mg Oral Given 8/15/20 0514)        Assessments & Plan (with Medical Decision Making)   40-year-old male presenting with abdominal pain, chest pain, anxiety and vomiting as described.  Clinically the patient is nontoxic and well-appearing.  At time of my examination the patient has absolutely no abdominal pain.    DDX is broad including ACS, PE, pancreatitis, biliary colic, bowel obstruction, anxiety, pneumothorax, gastroenteritis, peptic ulcer disease,  gastritis, among other causes    Patient given IV fluids, GI cocktail, and antiemetics as needed resulting in near complete resolution of his symptoms.  ECG is on a revealing, nonischemic  Labs unrevealing with a negative troponin, lipase 301.  Very mild transaminitis consistent with vomiting.    Given resolution of his symptoms a chest x-ray was not pursued.    Following continued observation, p.o. tolerance, my suspicion for an emergent process given the patient's clinical presentation and work-up is low.  Patient wishes to be discharged and this is appropriate.  Discussed strict ED return precautions and all questions answered.    - Patient agrees to our plan and is ready and eager for discharge. Care plan, follow up plan, and reasons to return immediately to the ED were dicussed in detail and summarized as noted in the discharge instructions.        I have reviewed the nursing notes. I have reviewed the findings, diagnosis, plan and need for follow up with the patient.    Discharge Medication List as of 8/15/2020  5:09 AM      START taking these medications    Details   famotidine (PEPCID) 20 MG tablet Take 1 tablet (20 mg) by mouth 2 times daily for 7 days, Disp-14 tablet,R-0, Local Print             Final diagnoses:   Abdominal pain, epigastric   Non-intractable vomiting with nausea, unspecified vomiting type       --  Daniel Mccurdy MD   Emergency Medicine   [facility], [place], EMERGENCY DEPARTMENT  8/15/2020     Daniel Mccurdy MD  08/15/20 0515

## 2020-08-15 NOTE — ED AVS SNAPSHOT
[facility], [place], Emergency Department  [address] [place] [address]  Phone:  [phone]                                    Rodolfo Bennett   MRN: [id number]    Department:  [facility], Emergency Department   Date of Visit:  8/15/2020           After Visit Summary Signature Page    I have received my discharge instructions, and my questions have been answered. I have discussed any challenges I see with this plan with the nurse or doctor.    ..........................................................................................................................................  Patient/Patient Representative Signature      ..........................................................................................................................................  Patient Representative Print Name and Relationship to Patient    ..................................................               ................................................  Date                                   Time    ..........................................................................................................................................  Reviewed by Signature/Title    ...................................................              ..............................................  Date                                               Time          22EPIC Rev 08/18

## 2020-08-15 NOTE — ED TRIAGE NOTES
Patient BIBA for nausea and vomiting that started this morning.  They are also complaining of abdominal pain.  Patient was given 4mg ODT en route.  Patient's mother was diagnosed with COVID 3 weeks ago.  Patient has no covid symptoms

## 2020-08-15 NOTE — ED NOTES
Bed: ED27  Expected date:   Expected time:   Means of arrival:   Comments:  H446  40 m  [place]  Yellow

## 2020-08-15 NOTE — DISCHARGE INSTRUCTIONS
Please make an appointment to follow up with Your Primary Care Provider in 7 days even if entirely better.  You can call to discuss the appropriate follow up timing with your doctor.     Take pepcid as given and discuss if this is helpful with your primary care doctor.    Return to the emergency department for any worsening back pain, abdominal pain, fevers or chills, burning with urination, nausea or vomiting, weakness or any other concerns as given or discussed.

## 2020-08-17 ENCOUNTER — PATIENT OUTREACH (OUTPATIENT)
Dept: CARE COORDINATION | Facility: CLINIC | Age: 40
End: 2020-08-17

## 2020-08-17 DIAGNOSIS — Z71.89 OTHER SPECIFIED COUNSELING: ICD-10-CM

## 2020-08-17 DIAGNOSIS — U07.1 2019 NOVEL CORONAVIRUS DISEASE (COVID-19): Primary | ICD-10-CM

## 2020-08-17 NOTE — LETTER
Health Care Home - Access Care Plan    About Me:    Patient Name:  Rodolfo Bennett    YOB: 1980  Age:                      40 year old   Chris MRN:     [id number] Telephone Information:   Home Phone [phone]   Mobile [phone]       Address:  [address] [place] [address] Email address:  No e-mail address on record      Emergency Contact(s)   Name Relationship Lgl Grd Work Phone Home Phone Mobile Phone   1. BARBER BENNETT Mother   [phone]   2. DECLINED Declined   [phone]              Health Maintenance:    Health Maintenance Due   Topic Date Due     PREVENTIVE CARE VISIT  1980     DTAP/TDAP/TD IMMUNIZATION (1 - Tdap) 06/05/2005     PHQ-9  03/06/2020     My Access Plan  Medical Emergency 911   Questions or concerns during clinic hours Primary Clinic Line, I will call the clinic directly: [facility] - [phone]   24 Hour Appointment Line [phone] or  [phone]) (toll free)   24 Hour Nurse Line [phone] (toll free)   Questions or concerns outside clinic hours 24 Hour Appointment Line, I will call the after-hours on-call line:   [facility] [phone] or [phone] ([phone]) (toll-free)   Preferred Urgent Care     Preferred Hospital     Preferred Pharmacy [id number] [employer] CLOSED - [place], [place] - [address]     Behavioral Health Crisis Line The National Suicide Prevention Lifeline at 1-810.214.7054 or 911     My Care Team Members  Patient Care Team       Relationship Specialty Notifications Start End    Aldo Ricci MD PCP - General Family Practice  1/2/13     Phone: [phone] Fax: [phone] [address] [facility] [address]    Aldo Ricci MD Assigned PCP   9/17/17     Phone: [phone] Fax: [phone] [address] [facility] [address]           My Medical and Care Information  Problem List   Patient  Active Problem List   Diagnosis     Hypertrophy of breast     Positive PPD     Abdominal tenderness, epigastric     Anxiety state     Depressive disorder, not elsewhere classified     Closed fracture of shaft of radius (alone)     H. pylori infection     Non morbid obesity due to excess calories      Current Medications and Allergies:    Current Outpatient Medications   Medication     citalopram (CELEXA) 10 MG tablet     famotidine (PEPCID) 20 MG tablet     No current facility-administered medications for this visit.

## 2020-08-17 NOTE — PROGRESS NOTES
Clinic Care Coordination Contact  [facility]/Voicemail     Clinical Data: Care Coordinator Outreach    Outreach attempted x 1.  Left message on patient's voicemail with call back information and requested return call.    Plan: Care Coordinator will try to reach patient again in 1-2 business days.    JENNIFER Andujar   Social Work Clinic Care Coordinator   [facility] and [facility]   PH: [phone]  [email or web address]

## 2020-08-17 NOTE — LETTER
[facility] CARE COORDINATION  [facility]  [address] [place] [address]    August 20, 2020    Rodolfo Bennett  [address] [place] [address]      Dear Rodolfo,    I am a clinic care coordinator who works at [facility]. I wanted to introduce myself and provide you with my contact information for you to be able to call me with any questions or concerns. Below is a description of clinic care coordination and how I can further assist you.      The clinic care coordination team is made up of a registered nurse,  and community health worker who understand the health care system. The goal of clinic care coordination is to help you manage your health and improve access to the health care system in the most efficient manner. The team can assist you in meeting your health care goals by providing education, coordinating services, strengthening the communication among your providers and supporting you with any resource needs.    Please feel free to contact the Community Health Worker at [phone] with any questions or concerns. We are focused on providing you with the highest-quality healthcare experience possible and that all starts with you.     Sincerely,     Lida Callejas [facility]   Social Work Clinic Care Coordinator   [facility], [facility], and [facility]   PH: [phone]  [email or web address]   Enclosed: I have enclosed a copy of a 24 Hour Access Plan. This has helpful phone numbers for you to call when needed. Please keep this in an easy to access place to use as needed.

## 2020-08-18 NOTE — PROGRESS NOTES
Clinic Care Coordination Contact  [facility]/Voicemail    Clinical Data: Care Coordinator Outreach    SW CC received voicemail back from pt. Requested call back. Pt left message without .     Outreach attempted x 2.  Left message on patient's voicemail with call back information and requested return call.    Plan: Care Coordinator will try to reach patient again in 1-2 business days.    JENNIFER Andujar   Social Work Clinic Care Coordinator   [facility] and [facility]   PH: [phone]  [email or web address]

## 2020-08-20 NOTE — PROGRESS NOTES
Clinic Care Coordination Contact  [facility]/Voicemail    Clinical Data: Care Coordinator Outreach    Outreach attempted x 3.  Left message on patient's voicemail with call back information and requested return call.    Plan: Care Coordinator will send care coordination introduction letter with care coordinator contact information and explanation of care coordination services via mail. Care Coordinator will do no further outreaches at this time.    JENNIFER Andujar   Social Work Clinic Care Coordinator   [facility] and [facility]   PH: [phone]  [email or web address]

## 2020-09-30 DIAGNOSIS — F41.9 ANXIETY: ICD-10-CM

## 2020-09-30 RX ORDER — CITALOPRAM HYDROBROMIDE 10 MG/1
10 TABLET ORAL DAILY
Qty: 30 TABLET | Refills: 0 | Status: SHIPPED | OUTPATIENT
Start: 2020-09-30 | End: 2022-07-01

## 2020-09-30 NOTE — TELEPHONE ENCOUNTER
Patient care team-    Aga refill has been given.    Appointment needed for patient.  For: yearly visit    Please call patient 2 times in attempt to schedule an appointment for ASAP.    Close encounter if not reachable             Medication is being filled for 1 time refill only due to:  Patient needs to be seen because it has been more than one year since last visit.

## 2020-10-01 NOTE — TELEPHONE ENCOUNTER
Patient states he has the meds on hand for when needed. He understands that he will need an appt before he gets any more

## 2020-11-28 DIAGNOSIS — F41.9 ANXIETY: ICD-10-CM

## 2020-11-30 NOTE — TELEPHONE ENCOUNTER
Patient Contact    Attempt # 1    Was call answered?  No.  Left message on voicemail with information to call triage back.    On call back:     -Due for physical - help to schedule appointment

## 2020-12-01 RX ORDER — CITALOPRAM HYDROBROMIDE 10 MG/1
TABLET ORAL
Qty: 30 TABLET | Refills: 0 | OUTPATIENT
Start: 2020-12-01

## 2020-12-01 NOTE — TELEPHONE ENCOUNTER
Patient was called, he does not need a refill, he is using this prn and still has some left. He feels less anxious knowing that it is there and available. Sometimes he just keeps one pill in his pocket and that helps him relax knowing he could take it at any time. Sounds like he is managing his stress well by focusing on anxiety triggers and figuring out the solution. He does know that if he would need an annual visit if he feels like he needs more.

## 2021-01-26 ENCOUNTER — VIRTUAL VISIT (OUTPATIENT)
Dept: URGENT CARE | Facility: CLINIC | Age: 41
End: 2021-01-26
Payer: COMMERCIAL

## 2021-01-26 DIAGNOSIS — F41.9 ANXIETY: ICD-10-CM

## 2021-01-26 DIAGNOSIS — F32.A DEPRESSION, UNSPECIFIED DEPRESSION TYPE: Primary | ICD-10-CM

## 2021-01-26 PROCEDURE — 99214 OFFICE O/P EST MOD 30 MIN: CPT | Mod: 95 | Performed by: EMERGENCY MEDICINE

## 2021-01-26 RX ORDER — CITALOPRAM HYDROBROMIDE 10 MG/1
10 TABLET ORAL DAILY
Qty: 90 TABLET | Refills: 0 | Status: SHIPPED | OUTPATIENT
Start: 2021-01-26 | End: 2022-07-01

## 2021-01-26 NOTE — PROGRESS NOTES
HPI: Patient is a 40-year-old male who is been on Celexa intermittently for anxiety.  He is requesting a refill.  He normally takes Celexa 10 mg daily.  He feels recently as if his anxiety is flared up again and would like to be taking the medication.  He has no overwhelming depressive thinking.  He feels that his emotions have been fluctuating due to antisocial requirements related to Covid.      ROS: See HPI otherwise normal.    Allergies   Allergen Reactions     Dust Mites       Current Outpatient Medications   Medication Sig Dispense Refill     citalopram (CELEXA) 10 MG tablet Take 1 tablet (10 mg) by mouth daily 90 tablet 0     citalopram (CELEXA) 10 MG tablet Take 1 tablet (10 mg) by mouth daily 30 tablet 0         PE: No acute distress.  Slightly anxious sounding.  Alert and oriented.        TREATMENT: None.      ASSESSMENT: Medication refill for anxiety medication.  No acute concerning associated symptoms.      DIAGNOSIS: Anxiety.      PLAN: Celexa 10 mg #30 ordered.  Follow-up with PCP as needed.    Time: 10 minutes

## 2022-05-19 ENCOUNTER — OFFICE VISIT (OUTPATIENT)
Dept: PEDIATRICS | Facility: CLINIC | Age: 42
End: 2022-05-19
Payer: COMMERCIAL

## 2022-05-19 VITALS
SYSTOLIC BLOOD PRESSURE: 110 MMHG | HEART RATE: 60 BPM | RESPIRATION RATE: 16 BRPM | OXYGEN SATURATION: 98 % | BODY MASS INDEX: 31.32 KG/M2 | DIASTOLIC BLOOD PRESSURE: 60 MMHG | WEIGHT: 206 LBS | TEMPERATURE: 97.7 F

## 2022-05-19 DIAGNOSIS — L30.9 DERMATITIS: ICD-10-CM

## 2022-05-19 DIAGNOSIS — R10.13 EPIGASTRIC PAIN: Primary | ICD-10-CM

## 2022-05-19 PROCEDURE — 99203 OFFICE O/P NEW LOW 30 MIN: CPT | Performed by: INTERNAL MEDICINE

## 2022-05-19 RX ORDER — FAMOTIDINE 40 MG/1
40 TABLET, FILM COATED ORAL DAILY
Qty: 90 TABLET | Refills: 0 | Status: SHIPPED | OUTPATIENT
Start: 2022-05-19 | End: 2022-05-19

## 2022-05-19 RX ORDER — FAMOTIDINE 40 MG/1
40 TABLET, FILM COATED ORAL DAILY
Qty: 90 TABLET | Refills: 0 | Status: SHIPPED | OUTPATIENT
Start: 2022-05-19 | End: 2022-07-01

## 2022-05-19 RX ORDER — TRIAMCINOLONE ACETONIDE 1 MG/G
CREAM TOPICAL 2 TIMES DAILY PRN
Qty: 80 G | Refills: 3 | Status: SHIPPED | OUTPATIENT
Start: 2022-05-19 | End: 2023-01-23

## 2022-05-19 NOTE — PROGRESS NOTES
Assessment & Plan     (R10.13) Epigastric pain  (primary encounter diagnosis)  Comment: suspect GERD.  Recommend trial of famotidine.   handout regarding GERD precautions-- Avoid ASA, NSAID's, caffeine, alcohol and tobacco.   Follow-up 2 weeks if sx persist  Plan: famotidine (PEPCID) 40 MG tablet    (L30.9) Dermatitis  Comment: pruritic perineal rash, improved with triamcinolone  Plan: triamcinolone (KENALOG) 0.1 % external cream              Kennedy Loredo MD  [facility] SETH Reynolds is a 41 year old who presents for the following health issues     HPI     Pain History:  When did you first notice your pain? - Acute Pain   Have you seen anyone else for your pain? No  Where in your body do you have pain? Abdominal pain  Onset/Duration: 3 weeks, was fasting for Ramadan   Description:   Character: Sharp  Location: epigastric, periumbilical.  H/o of GERD  Radiation: None  Intensity: moderate  Progression of Symptoms:  Persistent past few weeks  Accompanying Signs & Symptoms:  Fever/Chills: no  Gas/Bloating: no  Nausea: no  Vomitting: no  Diarrhea: no  Constipation: no  Dysuria or Hematuria: no  History:   Trauma: no  Previous similar pain: no  Previous tests done: none  Precipitating factors:   Does the pain change with:     Food: worse with certain foods    Bowel Movement: no    Urination: no   Other factors:  no  Therapies tried and outcome:     Patient Active Problem List   Diagnosis     Hypertrophy of breast     Positive PPD     Anxiety state     Depressive disorder, not elsewhere classified     H. pylori infection     Non morbid obesity due to excess calories             Objective    /60   Pulse 60   Temp 97.7  F (36.5  C) (Tympanic)   Resp 16   Wt 93.4 kg (206 lb)   SpO2 98%   BMI 31.32 kg/m    Body mass index is 31.32 kg/m .  Physical Exam   GENERAL: healthy, alert and no distress  NECK: no adenopathy, no asymmetry, masses, or scars and thyroid normal to palpation  RESP:  lungs clear to auscultation - no rales, rhonchi or wheezes  CV: regular rate and rhythm, normal S1 S2, no S3 or S4, no murmur, click or rub, no peripheral edema  ABDOMEN: soft, nontender, no hepatosplenomegaly, no masses and bowel sounds normal  MS: no gross musculoskeletal defects noted, no edema  NEURO: Normal strength and tone, mentation intact and speech normal  PSYCH: mentation appears normal, affect normal/bright     0

## 2022-07-01 ENCOUNTER — OFFICE VISIT (OUTPATIENT)
Dept: FAMILY MEDICINE | Facility: CLINIC | Age: 42
End: 2022-07-01
Payer: COMMERCIAL

## 2022-07-01 VITALS
DIASTOLIC BLOOD PRESSURE: 62 MMHG | WEIGHT: 210 LBS | TEMPERATURE: 98.3 F | BODY MASS INDEX: 31.83 KG/M2 | HEART RATE: 68 BPM | HEIGHT: 68 IN | SYSTOLIC BLOOD PRESSURE: 103 MMHG | RESPIRATION RATE: 16 BRPM

## 2022-07-01 DIAGNOSIS — K29.70 HELICOBACTER PYLORI GASTRITIS: Primary | ICD-10-CM

## 2022-07-01 DIAGNOSIS — B96.81 HELICOBACTER PYLORI GASTRITIS: Primary | ICD-10-CM

## 2022-07-01 LAB
ERYTHROCYTE [DISTWIDTH] IN BLOOD BY AUTOMATED COUNT: 12.9 % (ref 10–15)
HCT VFR BLD AUTO: 44.1 % (ref 40–53)
HGB BLD-MCNC: 15.9 G/DL (ref 13.3–17.7)
MCH RBC QN AUTO: 32 PG (ref 26.5–33)
MCHC RBC AUTO-ENTMCNC: 36.1 G/DL (ref 31.5–36.5)
MCV RBC AUTO: 89 FL (ref 78–100)
PLATELET # BLD AUTO: 288 10E3/UL (ref 150–450)
RBC # BLD AUTO: 4.97 10E6/UL (ref 4.4–5.9)
WBC # BLD AUTO: 6.3 10E3/UL (ref 4–11)

## 2022-07-01 PROCEDURE — 85027 COMPLETE CBC AUTOMATED: CPT | Performed by: FAMILY MEDICINE

## 2022-07-01 PROCEDURE — 99214 OFFICE O/P EST MOD 30 MIN: CPT | Performed by: FAMILY MEDICINE

## 2022-07-01 PROCEDURE — 36415 COLL VENOUS BLD VENIPUNCTURE: CPT | Performed by: FAMILY MEDICINE

## 2022-07-01 RX ORDER — BISMUTH SUBSALICYLATE 262 MG/1
1 TABLET, CHEWABLE ORAL 4 TIMES DAILY
Qty: 56 TABLET | Refills: 0 | Status: SHIPPED | OUTPATIENT
Start: 2022-07-01 | End: 2022-07-15

## 2022-07-01 RX ORDER — METRONIDAZOLE 250 MG/1
250 TABLET ORAL 4 TIMES DAILY
Qty: 56 TABLET | Refills: 0 | Status: SHIPPED | OUTPATIENT
Start: 2022-07-01 | End: 2022-07-15

## 2022-07-01 RX ORDER — TETRACYCLINE HYDROCHLORIDE 500 MG/1
500 CAPSULE ORAL 4 TIMES DAILY
Qty: 56 CAPSULE | Refills: 0 | Status: SHIPPED | OUTPATIENT
Start: 2022-07-01 | End: 2022-07-15

## 2022-07-01 ASSESSMENT — PATIENT HEALTH QUESTIONNAIRE - PHQ9
SUM OF ALL RESPONSES TO PHQ QUESTIONS 1-9: 7
SUM OF ALL RESPONSES TO PHQ QUESTIONS 1-9: 7
10. IF YOU CHECKED OFF ANY PROBLEMS, HOW DIFFICULT HAVE THESE PROBLEMS MADE IT FOR YOU TO DO YOUR WORK, TAKE CARE OF THINGS AT HOME, OR GET ALONG WITH OTHER PEOPLE: NOT DIFFICULT AT ALL

## 2022-07-01 NOTE — PROGRESS NOTES
"  Assessment & Plan     Helicobacter pylori gastritis  Patient with prior diagnosis of H. pylori he though just based on serum IgG and symptoms but did not previously take his treatment antibiotics.  This is certainly a possibility.  I do recommend stool testing but consider quadruple treatment below.  If symptoms ongoing and not improving with treatment then recommend upper endoscopy.  If positive stool test then recommend retesting for test of cure in 2 months.  - Helicobacter pylori Antigen Stool; Future  - CBC with platelets  - metroNIDAZOLE (FLAGYL) 250 MG tablet; Take 1 tablet (250 mg) by mouth 4 times daily for 14 days  - tetracycline (ACHROMYCIN/SUMYCIN) 500 MG capsule; Take 1 capsule (500 mg) by mouth 4 times daily for 14 days  - bismuth subsalicylate (PEPTO BISMOL) 262 MG chewable tablet; Take 1 tablet (262 mg) by mouth 4 times daily for 14 days  - omeprazole (PRILOSEC) 20 MG DR capsule; Take 1 capsule (20 mg) by mouth 2 times daily for 14 days  - Helicobacter pylori Antigen Stool; Future  - Helicobacter pylori Antigen Stool  - Helicobacter pylori Antigen Stool       BMI:   Estimated body mass index is 31.93 kg/m  as calculated from the following:    Height as of this encounter: 1.727 m (5' 8\").    Weight as of this encounter: 95.3 kg (210 lb).     No follow-ups on file.    Anand Kauffman MD  [facility] is a 42 year old, presenting for the following health issues:  Gastric Problem (GI symptoms x1-2 months, c/o abdominal pain and acid reflux)    Patient with epigastric abdominal pain over the past few years worse in the last few months.  Has increased pain with eating spicy or acidic foods.  Does not use alcohol or caffeine, no tobacco.  Has had diagnosis of H. pylori though just based on blood test with IgG and symptoms.  Has had some improvement with famotidine.  Has used other over-the-counter antiacid medicines in the past with some improvement.  Denies " "hematochezia or melena.  States that he never took antibiotics when he was given them for H. pylori in the past with concerns of side effects.    Review of Systems         Objective    /62 (BP Location: Right arm, Patient Position: Chair, Cuff Size: Adult Large)   Pulse 68   Temp 98.3  F (36.8  C) (Oral)   Resp 16   Ht 1.727 m (5' 8\")   Wt 95.3 kg (210 lb)   BMI 31.93 kg/m    Body mass index is 31.93 kg/m .  Physical Exam   GENERAL: healthy, alert and no distress  ABDOMEN: soft, nontender, no hepatosplenomegaly, no masses and bowel sounds normal                    .  ..  "

## 2022-07-01 NOTE — PATIENT INSTRUCTIONS
H pylori stool test - drop off at any lab    Treat with quadruple treatment for H pylori    Retest in 2 months    If not present on test but still having symptoms then - recommend upper endoscopy

## 2022-07-21 ENCOUNTER — IMMUNIZATION (OUTPATIENT)
Dept: NURSING | Facility: CLINIC | Age: 42
End: 2022-07-21
Payer: COMMERCIAL

## 2022-07-21 PROCEDURE — 91305 COVID-19,PF,PFIZER (12+ YRS): CPT

## 2022-07-21 PROCEDURE — 0051A COVID-19,PF,PFIZER (12+ YRS): CPT

## 2022-07-23 ENCOUNTER — HOSPITAL ENCOUNTER (EMERGENCY)
Facility: CLINIC | Age: 42
Discharge: HOME | End: 2022-07-23
Attending: EMERGENCY MEDICINE | Admitting: EMERGENCY MEDICINE
Payer: COMMERCIAL

## 2022-07-23 VITALS
RESPIRATION RATE: 18 BRPM | SYSTOLIC BLOOD PRESSURE: 135 MMHG | WEIGHT: 212 LBS | HEART RATE: 76 BPM | BODY MASS INDEX: 32.23 KG/M2 | DIASTOLIC BLOOD PRESSURE: 93 MMHG | OXYGEN SATURATION: 98 %

## 2022-07-23 DIAGNOSIS — R10.12 ABDOMINAL PAIN, LEFT UPPER QUADRANT: ICD-10-CM

## 2022-07-23 DIAGNOSIS — R11.2 NON-INTRACTABLE VOMITING WITH NAUSEA, UNSPECIFIED VOMITING TYPE: ICD-10-CM

## 2022-07-23 LAB
ALBUMIN SERPL BCG-MCNC: 4.1 G/DL (ref 3.5–5.2)
ALP SERPL-CCNC: 79 U/L (ref 40–129)
ALT SERPL W P-5'-P-CCNC: 39 U/L (ref 10–50)
ANION GAP SERPL CALCULATED.3IONS-SCNC: 12 MMOL/L (ref 7–15)
AST SERPL W P-5'-P-CCNC: 34 U/L (ref 10–50)
BASOPHILS # BLD AUTO: 0.1 10E3/UL (ref 0–0.2)
BASOPHILS NFR BLD AUTO: 1 %
BILIRUB SERPL-MCNC: 0.2 MG/DL
BUN SERPL-MCNC: 13.1 MG/DL (ref 6–20)
CALCIUM SERPL-MCNC: 8.8 MG/DL (ref 8.6–10)
CHLORIDE SERPL-SCNC: 101 MMOL/L (ref 98–107)
CREAT SERPL-MCNC: 0.92 MG/DL (ref 0.67–1.17)
DEPRECATED HCO3 PLAS-SCNC: 23 MMOL/L (ref 22–29)
EOSINOPHIL # BLD AUTO: 0.2 10E3/UL (ref 0–0.7)
EOSINOPHIL NFR BLD AUTO: 2 %
ERYTHROCYTE [DISTWIDTH] IN BLOOD BY AUTOMATED COUNT: 13.2 % (ref 10–15)
GFR SERPL CREATININE-BSD FRML MDRD: >90 ML/MIN/1.73M2
GLUCOSE SERPL-MCNC: 128 MG/DL (ref 70–99)
HCT VFR BLD AUTO: 43.8 % (ref 40–53)
HGB BLD-MCNC: 15.2 G/DL (ref 13.3–17.7)
HOLD SPECIMEN: NORMAL
IMM GRANULOCYTES # BLD: 0 10E3/UL
IMM GRANULOCYTES NFR BLD: 0 %
LIPASE SERPL-CCNC: 58 U/L (ref 13–60)
LYMPHOCYTES # BLD AUTO: 2.7 10E3/UL (ref 0.8–5.3)
LYMPHOCYTES NFR BLD AUTO: 35 %
MCH RBC QN AUTO: 31.3 PG (ref 26.5–33)
MCHC RBC AUTO-ENTMCNC: 34.7 G/DL (ref 31.5–36.5)
MCV RBC AUTO: 90 FL (ref 78–100)
MONOCYTES # BLD AUTO: 0.9 10E3/UL (ref 0–1.3)
MONOCYTES NFR BLD AUTO: 12 %
NEUTROPHILS # BLD AUTO: 3.8 10E3/UL (ref 1.6–8.3)
NEUTROPHILS NFR BLD AUTO: 50 %
NRBC # BLD AUTO: 0 10E3/UL
NRBC BLD AUTO-RTO: 0 /100
PLATELET # BLD AUTO: 317 10E3/UL (ref 150–450)
POTASSIUM SERPL-SCNC: 4 MMOL/L (ref 3.4–5.3)
PROT SERPL-MCNC: 7 G/DL (ref 6.4–8.3)
RBC # BLD AUTO: 4.85 10E6/UL (ref 4.4–5.9)
SODIUM SERPL-SCNC: 136 MMOL/L (ref 136–145)
WBC # BLD AUTO: 7.7 10E3/UL (ref 4–11)

## 2022-07-23 PROCEDURE — 250N000013 HC RX MED GY IP 250 OP 250 PS 637: Performed by: EMERGENCY MEDICINE

## 2022-07-23 PROCEDURE — 96374 THER/PROPH/DIAG INJ IV PUSH: CPT

## 2022-07-23 PROCEDURE — 250N000011 HC RX IP 250 OP 636: Performed by: EMERGENCY MEDICINE

## 2022-07-23 PROCEDURE — 83690 ASSAY OF LIPASE: CPT | Performed by: EMERGENCY MEDICINE

## 2022-07-23 PROCEDURE — 250N000009 HC RX 250: Performed by: EMERGENCY MEDICINE

## 2022-07-23 PROCEDURE — 99284 EMERGENCY DEPT VISIT MOD MDM: CPT | Performed by: EMERGENCY MEDICINE

## 2022-07-23 PROCEDURE — 99284 EMERGENCY DEPT VISIT MOD MDM: CPT | Mod: 25

## 2022-07-23 PROCEDURE — 82310 ASSAY OF CALCIUM: CPT | Performed by: EMERGENCY MEDICINE

## 2022-07-23 PROCEDURE — 85025 COMPLETE CBC W/AUTO DIFF WBC: CPT | Performed by: EMERGENCY MEDICINE

## 2022-07-23 PROCEDURE — 36415 COLL VENOUS BLD VENIPUNCTURE: CPT | Performed by: EMERGENCY MEDICINE

## 2022-07-23 RX ORDER — HYDROMORPHONE HYDROCHLORIDE 1 MG/ML
0.5 INJECTION, SOLUTION INTRAMUSCULAR; INTRAVENOUS; SUBCUTANEOUS
Status: DISCONTINUED | OUTPATIENT
Start: 2022-07-23 | End: 2022-07-23 | Stop reason: HOSPADM

## 2022-07-23 RX ORDER — ONDANSETRON 2 MG/ML
4 INJECTION INTRAMUSCULAR; INTRAVENOUS ONCE
Status: COMPLETED | OUTPATIENT
Start: 2022-07-23 | End: 2022-07-23

## 2022-07-23 RX ADMIN — LIDOCAINE HYDROCHLORIDE 30 ML: 20 SOLUTION ORAL; TOPICAL at 03:43

## 2022-07-23 RX ADMIN — ONDANSETRON 4 MG: 2 INJECTION INTRAMUSCULAR; INTRAVENOUS at 03:44

## 2022-07-23 NOTE — DISCHARGE INSTRUCTIONS
Instructions from your doctor today:  Emergency Department testing is focused on the potential causes of your symptoms that are the most dangerous possibilities, and cannot cover every possibility. Based on the evaluation, it was deemed sufficiently safe to discharge and continue management through the clinics. Thus, follow-up is very important to assess for improvement/worsening, potential further testing, and potential treatment adjustments. If you were given opioid pain medications or other medications that can make you drowsy while in the ED, you should not drive for at least several hours and not until you feel completely back to normal.     Please make an appointment to follow up with:  - Your Primary Care Provider in 5-10 days  - If you do not have a primary care provider, you can be seen in follow-up and establish care by calling any of the clinics below:     - [facility] (phone: [phone])     - Primary Care / [facility] (phone: [phone])   - Have your clinic provider review the results from today's visit with you again, including any potential follow-up or additional testing that may be needed based on the results. Occasionally, incidental findings are found on later review by radiologists that may need follow-up.     Return to the Emergency Department immediately if you have worsening symptoms, or any other urgent or potentially life-threatening concerns.

## 2022-07-23 NOTE — ED TRIAGE NOTES
"    Patient ate questionable chicken and dip from a restaurant this evening and \"all hell broke loose\" approximately one hour ago. He report having a history of H. Pylori and heartburn issues with spicy foods in the past, but nothing this severe. Denies vomiting, nausea, or diarrhea.   "

## 2022-07-23 NOTE — ED PROVIDER NOTES
History     Chief Complaint   Patient presents with     Abdominal Pain     HPI  Rodolfo Bennett is a 42 year old male with PMH notable for H. pylori, GERD who presents to the ED with abdominal pain.  Patient reports that symptoms started about an hour prior to arrival.  Patient noted eating some spicy chicken from a restaurant with some sauce.  Patient recently completed quad therapy 2 days ago, has not taken any further medications after finishing it.  Patient had 1 episode of vomiting in the ED waiting room, no diarrhea.  Patient reports pain is severe, primarily left upper quadrant, radiates into the throat with associated acid taste.  Patient feeling otherwise well recently.     Past Medical History  Past Medical History:   Diagnosis Date     Allergic state      Anxiety      No past surgical history on file.  triamcinolone (KENALOG) 0.1 % external cream      Allergies   Allergen Reactions     Dust Mites      Social History   Social History     Tobacco Use     Smoking status: Never Smoker     Smokeless tobacco: Never Used   Vaping Use     Vaping Use: Never used   Substance Use Topics     Alcohol use: No     Drug use: No      Past medical history and social history were reviewed with the patient. Additional pertinent items: H. pylori, recently completed quad therapy    Review of Systems  A complete review of systems was performed with pertinent positives and negatives noted in the HPI, and all other systems negative.    Physical Exam   BP: (!) 135/93  Pulse: 76  Resp: 18  Weight: 96.2 kg (212 lb)  SpO2: 98 %    Physical Exam  General: Very uncomfortable appearing.  Appears stated age.   HENT: MMM, no oropharyngeal lesions  Eyes: PERRL, normal sclerae  Cardio: Regular rate. Regular rhythm. Extremities well perfused  Resp: Normal work of breathing, normal respiratory rate.  Chest/Back: no visual signs of trauma, no CVA tenderness  Abdomen: LUQ only tenderness, non-distended, no rebound, no guarding  Neuro: alert  and fully oriented. CN II-XII grossly intact. Grossly normal strength and sensation in all extremities.   MSK: no deformities. Grossly normal ROM in extremities.   Integumentary/Skin: no rash visualized, normal color  Psych: normal affect, normal behavior    ED Course      Procedures          Labs Ordered and Resulted from Time of ED Arrival to Time of ED Departure - No data to display  No orders to display          Assessments & Plan (with Medical Decision Making)   Patient presenting with left upper quadrant abdominal pain and 1 episode of vomiting after eating some spicy chicken and recently completing quad therapy for H. pylori. Vitals in the ED unremarkable. Nursing notes reviewed. Initial differential diagnosis includes but not limited to gastritis, pancreatitis, intestinal colic, dyspepsia.     IV established and labs (CBC, CMP, lipase) sent.  The lab here had an instrument down, and thus lab results were very delayed.      In the ED, the patient's symptoms were managed with GI cocktail, with resolution of symptoms upon reassessment.  Given complete resolution of symptoms after GI cocktail and the patient recently having omeprazole stopped, there is high suspicion of rebound dyspepsia after discontinuation of PPI.    The complete clinical picture is most consistent with dyspepsia secondary to spicy food and discontinuation of PPI recently. After counseling on the diagnosis, work-up, and treatment plan, the patient was discharged to home.  Patient restarted on omeprazole.  The patient was advised to follow-up with primary care in about a week. The patient was advised to return to the ED if worsening symptoms, or if there are any urgent/life-threatening concerns.     Final diagnoses:   Abdominal pain, left upper quadrant   Non-intractable vomiting with nausea, unspecified vomiting type     New Prescriptions    OMEPRAZOLE (PRILOSEC) 20 MG DR CAPSULE    Take 1 capsule (20 mg) by mouth 2 times daily for 30 days        --  Jesus Laguna MD   Emergency Medicine   [facility] located within [facility] EMERGENCY DEPARTMENT  7/23/2022     Jesus Laguna MD  07/23/22 0454

## 2022-07-25 ENCOUNTER — OFFICE VISIT (OUTPATIENT)
Dept: PEDIATRICS | Facility: CLINIC | Age: 42
End: 2022-07-25
Payer: COMMERCIAL

## 2022-07-25 ENCOUNTER — TELEPHONE (OUTPATIENT)
Dept: GASTROENTEROLOGY | Facility: CLINIC | Age: 42
End: 2022-07-25

## 2022-07-25 VITALS
SYSTOLIC BLOOD PRESSURE: 100 MMHG | BODY MASS INDEX: 32.23 KG/M2 | RESPIRATION RATE: 18 BRPM | HEART RATE: 72 BPM | WEIGHT: 212 LBS | DIASTOLIC BLOOD PRESSURE: 60 MMHG | OXYGEN SATURATION: 97 % | TEMPERATURE: 97.2 F

## 2022-07-25 DIAGNOSIS — K21.9 GASTROESOPHAGEAL REFLUX DISEASE WITHOUT ESOPHAGITIS: Primary | ICD-10-CM

## 2022-07-25 DIAGNOSIS — Z11.59 NEED FOR HEPATITIS C SCREENING TEST: ICD-10-CM

## 2022-07-25 DIAGNOSIS — Z13.220 SCREENING FOR HYPERLIPIDEMIA: ICD-10-CM

## 2022-07-25 PROCEDURE — 86803 HEPATITIS C AB TEST: CPT | Performed by: NURSE PRACTITIONER

## 2022-07-25 PROCEDURE — 36415 COLL VENOUS BLD VENIPUNCTURE: CPT | Performed by: NURSE PRACTITIONER

## 2022-07-25 PROCEDURE — 80061 LIPID PANEL: CPT | Performed by: NURSE PRACTITIONER

## 2022-07-25 PROCEDURE — 99214 OFFICE O/P EST MOD 30 MIN: CPT | Performed by: NURSE PRACTITIONER

## 2022-07-25 NOTE — PATIENT INSTRUCTIONS
It was nice seeing you today.    Please let me know if you have any questions regarding today's visit!    Take care,    PINA Cherry DNP  Family Medicine

## 2022-07-25 NOTE — TELEPHONE ENCOUNTER
Screening Questions  BlueKIND OF PREP RedLOCATION [review exclusion criteria] GreenSEDATION TYPE      1.  n Are you active on mychart?       2.  Diana Cherry NP Ordering/Referring Provider:      3. [employer]are  What insurance is in the chart?    4.  y Do you have a legal guardian or medical Power of ?              Are you able to give consent for your medical care?                (Sedation review/consideration needed)      5.   32.23  BMI  [BMI OVER 40-EXTENDED PREP]  If greater than 40 review exclusion criteria [PAC APPT IF @ [facility]]       6.   n Have you had a positive covid test in the last 90 days?      7.   Respiratory Screening :  [If yes to any of the following HOSPITAL setting only]                     n Do you use daily home oxygen?   n Do you have mod to severe Obstructive Sleep Apnea?  [OKAY @ [facility] SH PH RI]                  n Do you have Pulmonary Hypertension?                   n Do you have UNCONTROLLED asthma?         8.   n Have you had a heart or lung transplant?       9.   n Are you currently on dialysis?   [ If yes, G-PREP & HOSPITAL setting only]      10.   n  Do you have chronic kidney disease?  [ If yes, G-PREP ]     11.  n Have you had a stroke or Transient ischemic attack (TIA - aka  mini stroke ) within 6 months?   (If yes, please review exclusion criteria)     12.   n In the past 6 months, have you had any heart related issues including cardiomyopathy or heart attack?           n If yes, did it require cardiac stenting or other implantable device?       13.   n Do you have any implantable devices in your body (pacemaker, defib, LVAD)?  (If yes, please review exclusion criteria)     14.   n Do you take nitroglycerin?            n If yes, how often? n  (if yes, HOSPITAL setting ONLY)     15.   n Are you currently taking any blood thinners?           [IF YES, INFORM PATIENT TO FOLLOW UP W/ ORDERING PROVIDER FOR BRIDGING INSTRUCTIONS]      16.    n  Do you have a diagnosis of  diabetes?  [ If yes, G-PREP ]     17.   [FEMALES] n Are you currently pregnant?    n If yes, how many weeks?      18.    n  Are you taking any prescription pain medications on a routine schedule?    [ If yes, EXTENDED PREP.] [If yes, MAC]     19.   n Do you have any chemical dependencies such as alcohol, street drugs, or methadone?   [If yes, MAC]     20.   n Do you have any history of post-traumatic stress syndrome, severe anxiety or history of psychosis?   [If yes, MAC]     21.   y Do you transfer independently?       22.   n  On a regular basis do you go 3-5 days between bowel movements?  [ If yes, EXTENDED PREP.]     23.    Preferred LOCAL Pharmacy for Pre Prescription     [id number] - [place], [place] - [address]          Scheduling Details         Rodolfo : Caller   (Please ask for phone number if not scheduled by patient)    Upper Endoscopy [EGD] : Type of Procedure Scheduled    Which Colonoscopy Prep was Sent?      carmen Surgeon    08/22 Date of Procedure   sh Location    mod Sedation Type     Conscious Sedation- Needs  for 6 hours after the procedure  MAC/General-Needs  for 24 hours after procedure     n Pre-op Required at [facility] [place], [place] and OR for MAC sedation   (advise patient they will need a pre-op prior to procedure -)       y Informed patient they will need an adult    Cannot take any type of public or medical transportation alone     home Pre-Procedure Covid [facility] or Externally     y  Confirmed Nurse will call to complete assessment     Additional comments:

## 2022-07-25 NOTE — LETTER
"August 1, 2022      Rodolfo Andrea Sabrina  [address] [facility] [address]        Dear ,    We are writing to inform you of your test results.    It was very nice meeting you!  Please see below regarding your lab results.     -Hepatitis C screening is negative   -Cholesterol is borderline high.  Please see below for ways to reduce your cholesterol.     The 10-year ASCVD risk score (Geovanny TOLLIVER Jr., et al., 2013) is: 1.1%     Values used to calculate the score:       Age: 42 years       Sex: Male       Is Non- : No       Diabetic: No       Tobacco smoker: No       Systolic Blood Pressure: 100 mmHg       Is BP treated: No       HDL Cholesterol: 46 mg/dL       Total Cholesterol: 205 mg/dL     Your ASCVD is 1.1%.   ASCVD (atherosclerotic cardiovascular disease) risk score is a national guideline developed by the American College of Cardiology and calculates a 10-year risk of having a cardiovascular problem, such as a heart attack or stroke.     Your cholesterol is elevated. I recommend attempts to lower your cholesterol through lifestyle changes at this time.     Focus on a diet emphasizing intake of vegetables, fruits, legumes, nuts, whole grains, and fish. A diet containing reduced amounts of cholesterol and sodium can also be beneficial. Replacement of saturated fats with dietary mono- and poly-unsaturated fats can be beneficial. Minimize intake of trans fats, processed meats, refined carbohydrates, and sweetened beverages as part of a heart healthy diet is reasonable. Trans fats, sometimes listed on food labels as \"partially hydrogenated vegetable oil,\" are often used in margarines and store-bought cookies, crackers and cakes. Trans fats raise overall cholesterol levels. Eat foods rich in omega-3 fatty acids. Omega-3 fatty acids don't affect LDL cholesterol. But they have other heart-healthy benefits, including reducing blood pressure. Foods with omega-3 fatty acids include salmon, " mackerel, herring, walnuts and flaxseeds. Increase soluble fiber. Soluble fiber can reduce the absorption of cholesterol into your bloodstream. Soluble fiber is found in such foods as oatmeal, kidney beans, [place] sprouts, apples and pears.     If you drink alcohol, do so in moderation. For healthy adults, that means up to one drink a day for women of all ages and men older than age 65, and up to two drinks a day for men age 65 and younger.     Optimize a physically active lifestyle. Engage in at least 150 minutes per week of accumulated moderate intensity or 75 minutes per week of vigorous intensity aerobic physical activity. Decrease sedentary behavior. Moderate intensity exercise would include activities like brisk walking (2.4-4 mph), biking (5-9 mph), ballroom dancing, active yoga, recreational swimming. Vigorous exercise would include things like jogging/running, biking >10 mph, singles tennis, swimming laps.     Please let me know if you have any questions,       Resulted Orders   Lipid panel reflex to direct LDL Non-fasting   Result Value Ref Range    Cholesterol 205 (H) <200 mg/dL    Triglycerides 81 <150 mg/dL    Direct Measure HDL 46 >=40 mg/dL    LDL Cholesterol Calculated 143 (H) <=100 mg/dL    Non HDL Cholesterol 159 (H) <130 mg/dL    Patient Fasting > 8hrs? Unknown     Narrative    Cholesterol  Desirable:  <200 mg/dL    Triglycerides  Normal:  Less than 150 mg/dL  Borderline High:  150-199 mg/dL  High:  200-499 mg/dL  Very High:  Greater than or equal to 500 mg/dL    Direct Measure HDL  Female:  Greater than or equal to 50 mg/dL   Male:  Greater than or equal to 40 mg/dL    LDL Cholesterol  Desirable:  <100mg/dL  Above Desirable:  100-129 mg/dL   Borderline High:  130-159 mg/dL   High:  160-189 mg/dL   Very High:  >= 190 mg/dL    Non HDL Cholesterol  Desirable:  130 mg/dL  Above Desirable:  130-159 mg/dL  Borderline High:  160-189 mg/dL  High:  190-219 mg/dL  Very High:  Greater than or equal to 220  mg/dL   Hepatitis C Screen Reflex to HCV RNA Quant and Genotype   Result Value Ref Range    Hepatitis C Antibody Nonreactive Nonreactive    Narrative    Assay performance characteristics have not been established for newborns, infants, and children.       If you have any questions or concerns, please call the clinic at the number listed above.       Sincerely,      Diana Cherry NP

## 2022-07-25 NOTE — PROGRESS NOTES
Assessment & Plan     Gastroesophageal reflux disease without esophagitis  Stable.  ER visit 7/23/2022.  Taking omeprazole 20mg daily.  EGD ordered to assess for possible ulcer  Follow-up as needed or in 2 months.  - omeprazole (PRILOSEC) 20 MG DR capsule; Take 1 capsule (20 mg) by mouth daily  - Adult GI  Referral - Procedure Only; Future    Need for hepatitis C screening test  - Hepatitis C Screen Reflex to HCV RNA Quant and Genotype; Future  - Hepatitis C Screen Reflex to HCV RNA Quant and Genotype    Screening for hyperlipidemia  - Lipid panel reflex to direct LDL Non-fasting       Declines Tdap today.      Return in about 2 months (around 9/25/2022) for Routine preventive.    Diana Cherry NP  [facility] SETH Reynolds is a 42 year old presenting for the following health issues:    Gastrointestinal Problem    HPI     GERD  -ER 7/23/2022.  Was given GI Cocktail and Zofran.  Symptoms resolved  -Hx of untreated H pylori in 2015.  Was seen 7/1/2022 and given treatment for H pylori.  Completed treatment.  Needs to submit stool sample in 8 weeks.  -Has been taking omeprazole daily until last Wednesday.  Was out of medication for 2 days and had spicy food and that lead him to the ER on the 23rd.  -Feels better today  -Needs refill today      Review of Systems   Constitutional, HEENT, cardiovascular, pulmonary, gi and gu systems are negative, except as otherwise noted.      Objective    /60 (BP Location: Right arm, Patient Position: Sitting, Cuff Size: Adult Regular)   Pulse 72   Temp 97.2  F (36.2  C) (Tympanic)   Resp 18   Wt 96.2 kg (212 lb)   SpO2 97%   BMI 32.23 kg/m    Body mass index is 32.23 kg/m .       Physical Exam   GENERAL: healthy, alert and no distress  RESP: lungs clear to auscultation - no rales, rhonchi or wheezes  CV: regular rate and rhythm, normal S1 S2, no S3 or S4, no murmur, click or rub, no peripheral edema and peripheral pulses strong  ABDOMEN:  soft, nontender, no hepatosplenomegaly, no masses and bowel sounds normal  PSYCH: mentation appears normal, affect normal/bright        .  ..

## 2022-07-26 LAB
CHOLEST SERPL-MCNC: 205 MG/DL
FASTING STATUS PATIENT QL REPORTED: ABNORMAL
HCV AB SERPL QL IA: NONREACTIVE
HDLC SERPL-MCNC: 46 MG/DL
LDLC SERPL CALC-MCNC: 143 MG/DL
NONHDLC SERPL-MCNC: 159 MG/DL
TRIGL SERPL-MCNC: 81 MG/DL

## 2022-08-07 ENCOUNTER — HOSPITAL ENCOUNTER (EMERGENCY)
Facility: CLINIC | Age: 42
Discharge: HOME | End: 2022-08-07
Attending: EMERGENCY MEDICINE | Admitting: EMERGENCY MEDICINE
Payer: COMMERCIAL

## 2022-08-07 VITALS
OXYGEN SATURATION: 95 % | RESPIRATION RATE: 16 BRPM | SYSTOLIC BLOOD PRESSURE: 121 MMHG | TEMPERATURE: 97.8 F | HEART RATE: 57 BPM | BODY MASS INDEX: 32.23 KG/M2 | WEIGHT: 212 LBS | DIASTOLIC BLOOD PRESSURE: 82 MMHG

## 2022-08-07 DIAGNOSIS — R10.13 ABDOMINAL PAIN, EPIGASTRIC: ICD-10-CM

## 2022-08-07 LAB
ALBUMIN SERPL BCG-MCNC: 4.4 G/DL (ref 3.5–5.2)
ALP SERPL-CCNC: 83 U/L (ref 40–129)
ALT SERPL W P-5'-P-CCNC: 40 U/L (ref 10–50)
ANION GAP SERPL CALCULATED.3IONS-SCNC: 11 MMOL/L (ref 7–15)
AST SERPL W P-5'-P-CCNC: 27 U/L (ref 10–50)
BASOPHILS # BLD AUTO: 0 10E3/UL (ref 0–0.2)
BASOPHILS NFR BLD AUTO: 0 %
BILIRUB SERPL-MCNC: 0.2 MG/DL
BUN SERPL-MCNC: 12.6 MG/DL (ref 6–20)
CALCIUM SERPL-MCNC: 9.3 MG/DL (ref 8.6–10)
CHLORIDE SERPL-SCNC: 102 MMOL/L (ref 98–107)
CREAT SERPL-MCNC: 0.89 MG/DL (ref 0.67–1.17)
DEPRECATED HCO3 PLAS-SCNC: 25 MMOL/L (ref 22–29)
EOSINOPHIL # BLD AUTO: 0 10E3/UL (ref 0–0.7)
EOSINOPHIL NFR BLD AUTO: 0 %
ERYTHROCYTE [DISTWIDTH] IN BLOOD BY AUTOMATED COUNT: 13.2 % (ref 10–15)
GFR SERPL CREATININE-BSD FRML MDRD: >90 ML/MIN/1.73M2
GLUCOSE SERPL-MCNC: 137 MG/DL (ref 70–99)
HCT VFR BLD AUTO: 43.6 % (ref 40–53)
HGB BLD-MCNC: 15.2 G/DL (ref 13.3–17.7)
HOLD SPECIMEN: NORMAL
IMM GRANULOCYTES # BLD: 0 10E3/UL
IMM GRANULOCYTES NFR BLD: 0 %
LIPASE SERPL-CCNC: 64 U/L (ref 13–60)
LYMPHOCYTES # BLD AUTO: 1.2 10E3/UL (ref 0.8–5.3)
LYMPHOCYTES NFR BLD AUTO: 16 %
MCH RBC QN AUTO: 31.5 PG (ref 26.5–33)
MCHC RBC AUTO-ENTMCNC: 34.9 G/DL (ref 31.5–36.5)
MCV RBC AUTO: 90 FL (ref 78–100)
MONOCYTES # BLD AUTO: 0.5 10E3/UL (ref 0–1.3)
MONOCYTES NFR BLD AUTO: 6 %
NEUTROPHILS # BLD AUTO: 5.7 10E3/UL (ref 1.6–8.3)
NEUTROPHILS NFR BLD AUTO: 78 %
NRBC # BLD AUTO: 0 10E3/UL
NRBC BLD AUTO-RTO: 0 /100
PLATELET # BLD AUTO: 272 10E3/UL (ref 150–450)
POTASSIUM SERPL-SCNC: 3.5 MMOL/L (ref 3.4–5.3)
PROT SERPL-MCNC: 7.2 G/DL (ref 6.4–8.3)
RBC # BLD AUTO: 4.83 10E6/UL (ref 4.4–5.9)
SODIUM SERPL-SCNC: 138 MMOL/L (ref 136–145)
WBC # BLD AUTO: 7.4 10E3/UL (ref 4–11)

## 2022-08-07 PROCEDURE — 250N000013 HC RX MED GY IP 250 OP 250 PS 637: Performed by: EMERGENCY MEDICINE

## 2022-08-07 PROCEDURE — 96376 TX/PRO/DX INJ SAME DRUG ADON: CPT | Performed by: EMERGENCY MEDICINE

## 2022-08-07 PROCEDURE — 96374 THER/PROPH/DIAG INJ IV PUSH: CPT | Performed by: EMERGENCY MEDICINE

## 2022-08-07 PROCEDURE — 83690 ASSAY OF LIPASE: CPT | Performed by: EMERGENCY MEDICINE

## 2022-08-07 PROCEDURE — C9113 INJ PANTOPRAZOLE SODIUM, VIA: HCPCS | Performed by: EMERGENCY MEDICINE

## 2022-08-07 PROCEDURE — 99284 EMERGENCY DEPT VISIT MOD MDM: CPT | Performed by: EMERGENCY MEDICINE

## 2022-08-07 PROCEDURE — 250N000009 HC RX 250: Performed by: EMERGENCY MEDICINE

## 2022-08-07 PROCEDURE — 250N000011 HC RX IP 250 OP 636: Performed by: EMERGENCY MEDICINE

## 2022-08-07 PROCEDURE — 36415 COLL VENOUS BLD VENIPUNCTURE: CPT | Performed by: EMERGENCY MEDICINE

## 2022-08-07 PROCEDURE — 99285 EMERGENCY DEPT VISIT HI MDM: CPT | Mod: 25 | Performed by: EMERGENCY MEDICINE

## 2022-08-07 PROCEDURE — 85014 HEMATOCRIT: CPT | Performed by: EMERGENCY MEDICINE

## 2022-08-07 PROCEDURE — 96375 TX/PRO/DX INJ NEW DRUG ADDON: CPT | Performed by: EMERGENCY MEDICINE

## 2022-08-07 PROCEDURE — 80053 COMPREHEN METABOLIC PANEL: CPT | Performed by: EMERGENCY MEDICINE

## 2022-08-07 RX ORDER — ONDANSETRON 4 MG/1
4 TABLET, ORALLY DISINTEGRATING ORAL ONCE
Status: COMPLETED | OUTPATIENT
Start: 2022-08-07 | End: 2022-08-07

## 2022-08-07 RX ORDER — SUCRALFATE ORAL 1 G/10ML
1 SUSPENSION ORAL 4 TIMES DAILY
Qty: 414 ML | Refills: 0 | Status: SHIPPED | OUTPATIENT
Start: 2022-08-07 | End: 2023-01-23

## 2022-08-07 RX ORDER — HYDROMORPHONE HYDROCHLORIDE 1 MG/ML
0.5 INJECTION, SOLUTION INTRAMUSCULAR; INTRAVENOUS; SUBCUTANEOUS
Status: DISCONTINUED | OUTPATIENT
Start: 2022-08-07 | End: 2022-08-07 | Stop reason: HOSPADM

## 2022-08-07 RX ADMIN — LIDOCAINE HYDROCHLORIDE 30 ML: 20 SOLUTION ORAL; TOPICAL at 03:10

## 2022-08-07 RX ADMIN — HYDROMORPHONE HYDROCHLORIDE 0.5 MG: 1 INJECTION, SOLUTION INTRAMUSCULAR; INTRAVENOUS; SUBCUTANEOUS at 07:19

## 2022-08-07 RX ADMIN — HYDROMORPHONE HYDROCHLORIDE 0.5 MG: 1 INJECTION, SOLUTION INTRAMUSCULAR; INTRAVENOUS; SUBCUTANEOUS at 06:10

## 2022-08-07 RX ADMIN — LIDOCAINE HYDROCHLORIDE 30 ML: 20 SOLUTION ORAL; TOPICAL at 04:46

## 2022-08-07 RX ADMIN — ONDANSETRON 4 MG: 4 TABLET, ORALLY DISINTEGRATING ORAL at 03:38

## 2022-08-07 RX ADMIN — PANTOPRAZOLE SODIUM 40 MG: 40 INJECTION, POWDER, FOR SOLUTION INTRAVENOUS at 06:07

## 2022-08-07 ASSESSMENT — ENCOUNTER SYMPTOMS
DIFFICULTY URINATING: 0
CONSTIPATION: 0
VOMITING: 0
PALPITATIONS: 0
SHORTNESS OF BREATH: 0
DIARRHEA: 0
WEAKNESS: 0
ABDOMINAL DISTENTION: 0
COLOR CHANGE: 0
ABDOMINAL PAIN: 1
SORE THROAT: 0
CHILLS: 0
ARTHRALGIAS: 0
COUGH: 0
NECK PAIN: 0
HEADACHES: 0
BACK PAIN: 0
CHEST TIGHTNESS: 0
FREQUENCY: 0
NAUSEA: 0
EYE PAIN: 0
CONFUSION: 0
DYSURIA: 0
DIZZINESS: 0
FEVER: 0
MYALGIAS: 0
FATIGUE: 0

## 2022-08-07 NOTE — DISCHARGE INSTRUCTIONS
Your work-up in the emergency department did not reveal any new medical problems.  I have started on a new medication called Carafate.  I would also like you to increase your omeprazole to twice a day.  You are to follow-up with your GI doctors as previously advised.

## 2022-08-07 NOTE — ED TRIAGE NOTES
Patient arrives with c/o severe abdominal pain from what patient believes was his  food he ate.      Triage Assessment     Row Name 08/07/22 0228       Triage Assessment (Adult)    Airway WDL WDL       Respiratory WDL    Respiratory WDL WDL       Skin Circulation/Temperature WDL    Skin Circulation/Temperature WDL WDL       Cardiac WDL    Cardiac WDL WDL       Peripheral/Neurovascular WDL    Peripheral Neurovascular WDL WDL       Cognitive/Neuro/Behavioral WDL    Cognitive/Neuro/Behavioral WDL WDL

## 2022-08-07 NOTE — ED PROVIDER NOTES
ED Provider Note  [facility]      History     Chief Complaint   Patient presents with     Abdominal Pain     HPI  Rodolfo Bennett is a 42 year old male with history of GERD, H. pylori, who presents to the ED for evaluation of abdominal pain.  Symptoms started at approximately 1:20 AM.  Where gradual in onset.  Feels like a sharp sensation in the midepigastric area and radiates throughout his stomach.  He states he was eating some spicy Citizen of [place] food earlier and then had a katie latte which she feels contributed to his symptoms.  Was seen in the ED for similar symptoms 2 weeks ago.  Had normal labs at that time.  Symptoms felt to be due to to dyspepsia due to recent discontinuation of PPI.  Patient also recently finished quad therapy for H. pylori.  He was restarted on omeprazole and is scheduled for endoscopy first week in September.  States he takes 20 mg omeprazole once daily.  No particular aggravating alleviating factors.  He has not taken any new medications for this tonight.  Denies any associated symptoms.  Specifically, denies fever/chills, chest pain, shortness of breath, vomiting, dysuria, melena/hematochezia    Past Medical History  Past Medical History:   Diagnosis Date     Allergic state      Anxiety      History reviewed. No pertinent surgical history.  omeprazole (PRILOSEC) 20 MG DR capsule  sucralfate (CARAFATE) 1 GM/10ML suspension  triamcinolone (KENALOG) 0.1 % external cream      Allergies   Allergen Reactions     Dust Mites      Family History  Family History   Problem Relation Age of Onset     Diabetes Mother      Unknown/Adopted Father      Social History   Social History     Tobacco Use     Smoking status: Never Smoker     Smokeless tobacco: Never Used   Vaping Use     Vaping Use: Never used   Substance Use Topics     Alcohol use: No     Drug use: No      Past medical history, past surgical history, medications, allergies, family history, and social history were  reviewed with the patient. No additional pertinent items.       Review of Systems   Constitutional: Negative for chills, fatigue and fever.   HENT: Negative for congestion and sore throat.    Eyes: Negative for pain and visual disturbance.   Respiratory: Negative for cough, chest tightness and shortness of breath.    Cardiovascular: Negative for chest pain and palpitations.   Gastrointestinal: Positive for abdominal pain. Negative for abdominal distention, constipation, diarrhea, nausea and vomiting.   Genitourinary: Negative for difficulty urinating, dysuria, frequency and urgency.   Musculoskeletal: Negative for arthralgias, back pain, myalgias and neck pain.   Skin: Negative for color change and rash.   Neurological: Negative for dizziness, weakness and headaches.   Psychiatric/Behavioral: Negative for confusion.     A complete review of systems was performed with pertinent positives and negatives noted in the HPI, and all other systems negative.    Physical Exam   BP: (!) 139/90  Pulse: 71  Temp: 97.8  F (36.6  C)  Resp: 20  Weight: 96.2 kg (212 lb)  SpO2: 98 %  Physical Exam  Vitals and nursing note reviewed.   Constitutional:       General: He is not in acute distress.     Appearance: Normal appearance. He is not ill-appearing or toxic-appearing.   HENT:      Head: Normocephalic and atraumatic.      Nose: Nose normal.      Mouth/Throat:      Mouth: Mucous membranes are moist.   Eyes:      Pupils: Pupils are equal, round, and reactive to light.   Cardiovascular:      Rate and Rhythm: Normal rate.      Pulses: Normal pulses.      Heart sounds: Normal heart sounds.   Pulmonary:      Effort: Pulmonary effort is normal. No respiratory distress.      Breath sounds: Normal breath sounds.   Abdominal:      General: Abdomen is flat. There is no distension.       Musculoskeletal:         General: No swelling or deformity. Normal range of motion.      Cervical back: Normal range of motion. No rigidity.   Skin:      General: Skin is warm.      Capillary Refill: Capillary refill takes less than 2 seconds.   Neurological:      Mental Status: He is alert and oriented to person, place, and time.   Psychiatric:         Mood and Affect: Mood normal.         ED Course        Results for orders placed or performed during the hospital encounter of 08/07/22   [facility] Draw     Status: None    Narrative    The following orders were created for panel order [facility].  Procedure                               Abnormality         Status                     ---------                               -----------         ------                     Extra Blue Top Tube[[id number]]                              Final result               Extra Red Top Tube[[id number]]                               Final result               Extra Green Top (Lithium...[[id number]]                      Final result               Extra Purple Top Tube[[id number]]                            Final result                 Please view results for these tests on the individual orders.   Extra Blue Top Tube     Status: None   Result Value Ref Range    Hold Specimen JIC    Extra Red Top Tube     Status: None   Result Value Ref Range    Hold Specimen JIC    Extra Green Top (Lithium Heparin) Tube     Status: None   Result Value Ref Range    Hold Specimen JIC    Extra Purple Top Tube     Status: None   Result Value Ref Range    Hold Specimen JIC    Comprehensive metabolic panel     Status: Abnormal   Result Value Ref Range    Sodium 138 136 - 145 mmol/L    Potassium 3.5 3.4 - 5.3 mmol/L    Creatinine 0.89 0.67 - 1.17 mg/dL    Urea Nitrogen 12.6 6.0 - 20.0 mg/dL    Chloride 102 98 - 107 mmol/L    Carbon Dioxide (CO2) 25 22 - 29 mmol/L    Anion Gap 11 7 - 15 mmol/L    Glucose 137 (H) 70 - 99 mg/dL    Calcium 9.3 8.6 - 10.0 mg/dL    Protein Total 7.2 6.4 - 8.3 g/dL    Albumin 4.4 3.5 - 5.2 g/dL    Bilirubin Total 0.2 <=1.2 mg/dL    Alkaline Phosphatase 83 40 - 129 U/L    AST 27 10 - 50  U/L    ALT 40 10 - 50 U/L    GFR Estimate >90 >60 mL/min/1.73m2   Lipase     Status: Abnormal   Result Value Ref Range    Lipase 64 (H) 13 - 60 U/L   CBC with platelets and differential     Status: None   Result Value Ref Range    WBC Count 7.4 4.0 - 11.0 10e3/uL    RBC Count 4.83 4.40 - 5.90 10e6/uL    Hemoglobin 15.2 13.3 - 17.7 g/dL    Hematocrit 43.6 40.0 - 53.0 %    MCV 90 78 - 100 fL    MCH 31.5 26.5 - 33.0 pg    MCHC 34.9 31.5 - 36.5 g/dL    RDW 13.2 10.0 - 15.0 %    Platelet Count 272 150 - 450 10e3/uL    % Neutrophils 78 %    % Lymphocytes 16 %    % Monocytes 6 %    % Eosinophils 0 %    % Basophils 0 %    % Immature Granulocytes 0 %    NRBCs per 100 WBC 0 <1 /100    Absolute Neutrophils 5.7 1.6 - 8.3 10e3/uL    Absolute Lymphocytes 1.2 0.8 - 5.3 10e3/uL    Absolute Monocytes 0.5 0.0 - 1.3 10e3/uL    Absolute Eosinophils 0.0 0.0 - 0.7 10e3/uL    Absolute Basophils 0.0 0.0 - 0.2 10e3/uL    Absolute Immature Granulocytes 0.0 <=0.4 10e3/uL    Absolute NRBCs 0.0 10e3/uL   CBC with platelets differential     Status: None    Narrative    The following orders were created for panel order CBC with platelets differential.  Procedure                               Abnormality         Status                     ---------                               -----------         ------                     CBC with platelets and d...[[id number]]                      Final result                 Please view results for these tests on the individual orders.     Medications   lidocaine (viscous) (XYLOCAINE) 2 % 15 mL, alum & mag hydroxide-simethicone (MAALOX) 15 mL GI Cocktail (30 mLs Oral Given 8/7/22 0310)   ondansetron (ZOFRAN ODT) ODT tab 4 mg (4 mg Oral Given 8/7/22 0338)   lidocaine (viscous) (XYLOCAINE) 2 % 15 mL, alum & mag hydroxide-simethicone (MAALOX) 15 mL GI Cocktail (30 mLs Oral Given 8/7/22 2870)   pantoprazole (PROTONIX) IV push injection 40 mg (40 mg Intravenous Given 8/7/22 0607)        Assessments & Plan (with  Medical Decision Making)   Patient presents to the ED for evaluation of epigastric pain.  differential diagnosis includes gastritis/peptic ulcer disease/GERD, pancreatitis, cholecystitis, hepatitis    On arrival, patient is normal vital signs.  Peers uncomfortable but not acutely toxic.  His abdomen is soft and nontender.  Patient was given a GI cocktail which she promptly vomited.  Was then given ODT Zofran.    Plan for abdominal work-up including CBC, CMP, lipase, urinalysis    Laboratory work-up is reassuring.  No lipase elevation to suggest acute pancreatitis.  No transaminitis or bilirubin elevation to suggest hepatobiliary pathology.  Normal electrolytes.  Normal renal function.  No anemia.  No leukocytosis or fever to suggest infectious etiology.    Reassessment, patient notes improvement of pain with GI cocktail.  Still is mildly tender on exam and endorses 6 out of 10 pain.  Was given 40 mg of IV Protonix as well as IV ondansetron.    On second reassessment, patient symptoms have resolved.  He overall appears well.  Symptoms likely consistent with GERD/gastritis.  Patient educated to continue omeprazole 20 mg twice daily (this is how it was prescribed, however, patient notes he is only taking it once daily).  Have also added Carafate.  Recommended PCP/GI follow-up.  Discussed dietary/lifestyle modifications.  Educated on reasons to return to the ED.        I have reviewed the nursing notes. I have reviewed the findings, diagnosis, plan and need for follow up with the patient.    Discharge Medication List as of 8/7/2022  7:02 AM      START taking these medications    Details   sucralfate (CARAFATE) 1 GM/10ML suspension Take 10 mLs (1 g) by mouth 4 times daily, Disp-414 mL, R-0, Local Print             Final diagnoses:   Abdominal pain, epigastric       --  Pal Ott DO  [facility] EMERGENCY DEPARTMENT  8/7/2022     Pal Ott DO  08/10/22 0153

## 2022-08-09 ENCOUNTER — TELEPHONE (OUTPATIENT)
Dept: PEDIATRICS | Facility: CLINIC | Age: 42
End: 2022-08-09
Payer: COMMERCIAL

## 2022-08-09 DIAGNOSIS — R11.0 NAUSEA: Primary | ICD-10-CM

## 2022-08-09 PROCEDURE — 99207 REFERRAL TO ACUTE AND DIAGNOSTIC SERVICES: CPT | Performed by: INTERNAL MEDICINE

## 2022-08-09 NOTE — TELEPHONE ENCOUNTER
Nurse Triage SBAR    Is this a 2nd Level Triage? NO    Situation: Ongoing abdominal pain.     Background: Patient has been seen in ER a few times for same symptoms and see in clinic for follow-up on 7/25/22. His last ER visit was on Sunday night for abdominal pain and vomiting - patient states he even vomited up the GI cocktail they gave him. He has appointment for endoscopy on 8/22/22, but requesting something to help with pain until his endoscopy    Assessment: No pain right now. Epigastric pain is intermittent, occurring during the night and will last for several hours regardless of what he eats during the day. He did not eat yesterday due to vomiting and was unable to sleep last night due to the pain. ER gave him prescription for Sucralfate, but this has not helped. He states that Tylenol 1000 mg has helped in the past, but did not work last night.     He has upper endoscopy scheduled for 8/22/22. He asks if there is anything that he can take to help with the pain until he can be seen.    Protocol Recommended Disposition:   No disposition on file.    Recommendation: Routed to provider to review for recommendations for pain management.      Routed to provider    Does the patient meet one of the following criteria for ADS visit consideration? 16+ years old, with an [facility] PCP     TIP  Providers, please consider if this condition is appropriate for management at one of our Acute and Diagnostic Services sites.     If patient is a good candidate, please use dotphrase <dot>triageresponse and select Refer to ADS to document.

## 2022-08-09 NOTE — TELEPHONE ENCOUNTER
Provider Response to 2nd Level Triage Request    I have reviewed the RN documentation. My recommendation is:  Refer to Acute and Diagnostic Services (ADS) clinic.     Referral to Acute and Diagnostic Services      Patient qualifies for First Order Acute services at the ADS clinic.    Patient diagnoses/treatments needed:  iv fluids, antiemetics  ADS Referral placed: Yes    Nursing staff to contact patient and confirm willingness to receive next level of care at the ADS site in [place] ([phone], internal use only) or [place] ([phone], internal use only). Confirm the following are true:      Patient has transportation to travel to [facility] without delay    Patient understands that evaluation/treatment at [facility] typically takes significantly longer than in clinic/urgent care (>2 hours)    If patient is in agreement, contact the ADS to confirm patient acceptance and provide necessary information to ADS provider.       For patients going to the [place] ADS, instruct patients to enter the east entrance of the building ([address]) and go to Check In C    For patients going to the [place] ADS, have them enter building ([address] attached to [facility]) and go to the 2nd floor, [address]

## 2022-08-09 NOTE — TELEPHONE ENCOUNTER
Called and spoke with patient.     The last time patient vomited was at 8/7/22.     Patient reports only having pain at night. He thinks that food is triggering his symptoms. Last night patient ate a small amount of white rice, 2 slices of white bread and a small cup of black tea with milk. He woke up around 1 am in 10 out of 10 pain. He took 2 tylenol which brought his pain level down to a 7. Patient has been sleeping on his side and not at an incline.     Requested patient not eat anything tonight and sleep in a recliner or propped up with pillows. Patient will also take tylenol and his Carafate right before bed.     Patient would like to know what other recommendations Dr. Loredo would have for his symptoms. If anything else can be prescribed and if there is any way his endoscopy could be moved up sooner.     Yanira Yeager RN on 8/9/2022 at 3:31 PM

## 2022-08-10 NOTE — TELEPHONE ENCOUNTER
Recent emergency room visits reviewed.  History of recent treatment for H. Pylori.  2 recent ER visits for recurrent epigastric pain following dietary triggers.    Scheduled for EGD in the next few weeks.       Patient should be taking omeprazole 20 mg twice daily as well as sucralfate 10 cc 4 times daily.  Please clarify that he is taking both medications as recommended.        Should continue to drink fluids every few hours to maintain hydration.  Once epigastric discomfort resolves he may resume soft bland foods gradually as tolerated.   Should not take ibuprofen or aspirin.  Should not drink alcohol and should avoid caffeine.  Recommend avoiding spicy foods which have triggered his prior flares.     Patient should stop taking sucralfate 48 hours prior to EGD.

## 2022-08-10 NOTE — TELEPHONE ENCOUNTER
Called and spoke with patient, he agrees with the plan of care and verbally understood the recommendations from Dr. Loredo below. Patient reports he is a  who works overnights and last night he did not have any pain due to not eating. He will try a bland diet as described below and will avoid anything with spices, caffeine, or alcohol as recommended. Patient will stop the sucralfate 48 hours prior to procedure as recommended. Patient will call back if he needs reminders or has further questions.     Annabelle SNYDER RN on 8/10/2022 at 8:46 AM

## 2022-08-12 ENCOUNTER — IMMUNIZATION (OUTPATIENT)
Dept: NURSING | Facility: CLINIC | Age: 42
End: 2022-08-12
Attending: FAMILY MEDICINE
Payer: COMMERCIAL

## 2022-08-12 PROCEDURE — 91305 COVID-19,PF,PFIZER (12+ YRS): CPT

## 2022-08-12 PROCEDURE — 0052A COVID-19,PF,PFIZER (12+ YRS): CPT

## 2022-08-19 ENCOUNTER — LAB (OUTPATIENT)
Dept: LAB | Facility: CLINIC | Age: 42
End: 2022-08-19
Payer: COMMERCIAL

## 2022-08-19 DIAGNOSIS — Z20.822 ENCOUNTER FOR LABORATORY TESTING FOR COVID-19 VIRUS: ICD-10-CM

## 2022-08-19 LAB — SARS-COV-2 RNA RESP QL NAA+PROBE: NEGATIVE

## 2022-08-19 PROCEDURE — U0005 INFEC AGEN DETEC AMPLI PROBE: HCPCS

## 2022-08-19 PROCEDURE — U0003 INFECTIOUS AGENT DETECTION BY NUCLEIC ACID (DNA OR RNA); SEVERE ACUTE RESPIRATORY SYNDROME CORONAVIRUS 2 (SARS-COV-2) (CORONAVIRUS DISEASE [COVID-19]), AMPLIFIED PROBE TECHNIQUE, MAKING USE OF HIGH THROUGHPUT TECHNOLOGIES AS DESCRIBED BY CMS-2020-01-R: HCPCS

## 2022-08-22 ENCOUNTER — HOSPITAL ENCOUNTER (OUTPATIENT)
Facility: CLINIC | Age: 42
Discharge: HOME | End: 2022-08-22
Attending: INTERNAL MEDICINE | Admitting: INTERNAL MEDICINE
Payer: COMMERCIAL

## 2022-08-22 VITALS
HEART RATE: 56 BPM | RESPIRATION RATE: 9 BRPM | SYSTOLIC BLOOD PRESSURE: 107 MMHG | DIASTOLIC BLOOD PRESSURE: 75 MMHG | OXYGEN SATURATION: 94 %

## 2022-08-22 DIAGNOSIS — A04.8 H. PYLORI INFECTION: Primary | ICD-10-CM

## 2022-08-22 LAB — UPPER GI ENDOSCOPY: NORMAL

## 2022-08-22 PROCEDURE — 250N000011 HC RX IP 250 OP 636: Performed by: INTERNAL MEDICINE

## 2022-08-22 PROCEDURE — 88305 TISSUE EXAM BY PATHOLOGIST: CPT | Mod: TC | Performed by: INTERNAL MEDICINE

## 2022-08-22 PROCEDURE — 43239 EGD BIOPSY SINGLE/MULTIPLE: CPT | Performed by: INTERNAL MEDICINE

## 2022-08-22 PROCEDURE — 88305 TISSUE EXAM BY PATHOLOGIST: CPT | Mod: 26 | Performed by: PATHOLOGY

## 2022-08-22 PROCEDURE — 88342 IMHCHEM/IMCYTCHM 1ST ANTB: CPT | Mod: 26 | Performed by: PATHOLOGY

## 2022-08-22 PROCEDURE — 250N000009 HC RX 250: Performed by: INTERNAL MEDICINE

## 2022-08-22 PROCEDURE — 999N000099 HC STATISTIC MODERATE SEDATION < 10 MIN: Performed by: INTERNAL MEDICINE

## 2022-08-22 RX ORDER — PANTOPRAZOLE SODIUM 40 MG/1
40 TABLET, DELAYED RELEASE ORAL DAILY
Qty: 30 TABLET | Refills: 3 | Status: SHIPPED | OUTPATIENT
Start: 2022-08-22 | End: 2023-01-23

## 2022-08-22 RX ORDER — FENTANYL CITRATE 50 UG/ML
INJECTION, SOLUTION INTRAMUSCULAR; INTRAVENOUS PRN
Status: COMPLETED | OUTPATIENT
Start: 2022-08-22 | End: 2022-08-22

## 2022-08-22 RX ADMIN — FENTANYL CITRATE 100 MCG: 50 INJECTION, SOLUTION INTRAMUSCULAR; INTRAVENOUS at 15:18

## 2022-08-22 RX ADMIN — TOPICAL ANESTHETIC 1 SPRAY: 200 SPRAY DENTAL; PERIODONTAL at 15:18

## 2022-08-22 RX ADMIN — MIDAZOLAM 2 MG: 1 INJECTION INTRAMUSCULAR; INTRAVENOUS at 15:18

## 2022-08-22 ASSESSMENT — ACTIVITIES OF DAILY LIVING (ADL): ADLS_ACUITY_SCORE: 35

## 2022-08-22 NOTE — H&P
[facility]  Pre-Endoscopy History and Physical     Rodolfo Bennett MRN# [id number]   YOB: 1980 Age: 42 year old     Date of Procedure: 8/22/2022  Primary care provider: Kennedy Loredo  Type of Endoscopy: esophagogastroduodenoscopy (upper GI endoscopy)  Reason for Procedure: GERD  Type of Anesthesia Anticipated: Moderate Sedation    HPI:    Rodolfo is a 42 year old male who will be undergoing the above procedure.      A history and physical has been performed. The patient's medications and allergies have been reviewed. The risks and benefits of the procedure and the sedation options and risks were discussed with the patient.  All questions were answered and informed consent was obtained.      He denies a personal or family history of anesthesia complications or bleeding disorders.     Allergies   Allergen Reactions     Dust Mites         Prior to Admission Medications   Prescriptions Last Dose Informant Patient Reported? Taking?   omeprazole (PRILOSEC) 20 MG DR capsule 8/21/2022 at Unknown time  No Yes   Sig: Take 1 capsule (20 mg) by mouth 2 times daily   sucralfate (CARAFATE) 1 GM/10ML suspension Unknown at Unknown time  No Yes   Sig: Take 10 mLs (1 g) by mouth 4 times daily   triamcinolone (KENALOG) 0.1 % external cream   No No   Sig: Apply topically 2 times daily as needed for irritation      Facility-Administered Medications: None       Patient Active Problem List   Diagnosis     Hypertrophy of breast     Positive PPD     Anxiety state     Depressive disorder, not elsewhere classified     H. pylori infection     Non morbid obesity due to excess calories        Past Medical History:   Diagnosis Date     Allergic state      Anxiety         History reviewed. No pertinent surgical history.    Social History     Tobacco Use     Smoking status: Never Smoker     Smokeless tobacco: Never Used   Substance Use Topics     Alcohol use: No       Family History   Problem Relation Age  "of Onset     Diabetes Mother      Unknown/Adopted Father        REVIEW OF SYSTEMS:     5 point ROS negative except as noted above in HPI, including Gen., Resp., CV, GI &  system review.      PHYSICAL EXAM:   /76   Pulse 60   Resp (!) 0   SpO2 100%  Estimated body mass index is 32.23 kg/m  as calculated from the following:    Height as of 7/1/22: 1.727 m (5' 8\").    Weight as of 8/7/22: 96.2 kg (212 lb).   GENERAL APPEARANCE: healthy, alert and no distress  MENTAL STATUS: alert  AIRWAY EXAM: Mallampatti Class I (visualization of the soft palate, fauces, uvula, anterior and posterior pillars)  RESP: lungs clear to auscultation - no rales, rhonchi or wheezes  CV: normal S1 S2, no S3 or S4      DIAGNOSTICS:    Not indicated      IMPRESSION   ASA Class 2 - Mild systemic disease        PLAN:       Plan for esophagogastroduodenoscopy (upper GI endoscopy). We discussed the risks, benefits and alternatives and the patient wished to proceed.    The above has been forwarded to the consulting provider.      Signed Electronically by: Anson Mercer MD,MD  August 22, 2022      [facility]  Ph: [phone] Fax: [phone]    "

## 2022-08-24 LAB
PATH REPORT.COMMENTS IMP SPEC: NORMAL
PATH REPORT.COMMENTS IMP SPEC: NORMAL
PATH REPORT.FINAL DX SPEC: NORMAL
PATH REPORT.GROSS SPEC: NORMAL
PATH REPORT.MICROSCOPIC SPEC OTHER STN: NORMAL
PATH REPORT.MICROSCOPIC SPEC OTHER STN: NORMAL
PATH REPORT.RELEVANT HX SPEC: NORMAL
PHOTO IMAGE: NORMAL

## 2022-08-28 ENCOUNTER — HOSPITAL ENCOUNTER (EMERGENCY)
Facility: CLINIC | Age: 42
Discharge: HOME | End: 2022-08-28
Attending: EMERGENCY MEDICINE | Admitting: EMERGENCY MEDICINE
Payer: COMMERCIAL

## 2022-08-28 ENCOUNTER — APPOINTMENT (OUTPATIENT)
Dept: CT IMAGING | Facility: CLINIC | Age: 42
End: 2022-08-28
Attending: EMERGENCY MEDICINE
Payer: COMMERCIAL

## 2022-08-28 ENCOUNTER — APPOINTMENT (OUTPATIENT)
Dept: ULTRASOUND IMAGING | Facility: CLINIC | Age: 42
End: 2022-08-28
Attending: EMERGENCY MEDICINE
Payer: COMMERCIAL

## 2022-08-28 VITALS
HEIGHT: 68 IN | RESPIRATION RATE: 16 BRPM | BODY MASS INDEX: 31.83 KG/M2 | TEMPERATURE: 97.8 F | WEIGHT: 210 LBS | OXYGEN SATURATION: 96 % | DIASTOLIC BLOOD PRESSURE: 72 MMHG | HEART RATE: 75 BPM | SYSTOLIC BLOOD PRESSURE: 104 MMHG

## 2022-08-28 DIAGNOSIS — R00.1 SINUS BRADYCARDIA: ICD-10-CM

## 2022-08-28 DIAGNOSIS — K80.20 SYMPTOMATIC CHOLELITHIASIS: ICD-10-CM

## 2022-08-28 LAB
ALBUMIN SERPL BCG-MCNC: 4 G/DL (ref 3.5–5.2)
ALBUMIN SERPL BCG-MCNC: 4.4 G/DL (ref 3.5–5.2)
ALBUMIN UR-MCNC: NEGATIVE MG/DL
ALP SERPL-CCNC: 71 U/L (ref 40–129)
ALP SERPL-CCNC: ABNORMAL U/L
ALT SERPL W P-5'-P-CCNC: 35 U/L (ref 10–50)
ALT SERPL W P-5'-P-CCNC: ABNORMAL U/L
AMPHETAMINES UR QL SCN: NORMAL
ANION GAP SERPL CALCULATED.3IONS-SCNC: 10 MMOL/L (ref 7–15)
ANION GAP SERPL CALCULATED.3IONS-SCNC: 13 MMOL/L (ref 7–15)
APPEARANCE UR: CLEAR
AST SERPL W P-5'-P-CCNC: 25 U/L (ref 10–50)
AST SERPL W P-5'-P-CCNC: ABNORMAL U/L
ATRIAL RATE - MUSE: 57 BPM
BACTERIA #/AREA URNS HPF: ABNORMAL /HPF
BARBITURATES UR QL SCN: NORMAL
BASOPHILS # BLD AUTO: 0 10E3/UL (ref 0–0.2)
BASOPHILS NFR BLD AUTO: 0 %
BENZODIAZ UR QL SCN: NORMAL
BILIRUB SERPL-MCNC: 0.2 MG/DL
BILIRUB SERPL-MCNC: 0.3 MG/DL
BILIRUB UR QL STRIP: NEGATIVE
BUN SERPL-MCNC: 10.9 MG/DL (ref 6–20)
BUN SERPL-MCNC: 11 MG/DL (ref 6–20)
BZE UR QL SCN: NORMAL
CALCIUM SERPL-MCNC: 8.6 MG/DL (ref 8.6–10)
CALCIUM SERPL-MCNC: 9.1 MG/DL (ref 8.6–10)
CANNABINOIDS UR QL SCN: NORMAL
CHLORIDE SERPL-SCNC: 100 MMOL/L (ref 98–107)
CHLORIDE SERPL-SCNC: 104 MMOL/L (ref 98–107)
COLOR UR AUTO: ABNORMAL
CREAT SERPL-MCNC: 0.83 MG/DL (ref 0.67–1.17)
CREAT SERPL-MCNC: 0.91 MG/DL (ref 0.67–1.17)
DEPRECATED HCO3 PLAS-SCNC: 20 MMOL/L (ref 22–29)
DEPRECATED HCO3 PLAS-SCNC: 25 MMOL/L (ref 22–29)
DIASTOLIC BLOOD PRESSURE - MUSE: NORMAL MMHG
EOSINOPHIL # BLD AUTO: 0 10E3/UL (ref 0–0.7)
EOSINOPHIL NFR BLD AUTO: 0 %
ERYTHROCYTE [DISTWIDTH] IN BLOOD BY AUTOMATED COUNT: 12.9 % (ref 10–15)
GFR SERPL CREATININE-BSD FRML MDRD: >90 ML/MIN/1.73M2
GFR SERPL CREATININE-BSD FRML MDRD: >90 ML/MIN/1.73M2
GLUCOSE SERPL-MCNC: 123 MG/DL (ref 70–99)
GLUCOSE SERPL-MCNC: 145 MG/DL (ref 70–99)
GLUCOSE UR STRIP-MCNC: NEGATIVE MG/DL
HCT VFR BLD AUTO: 45 % (ref 40–53)
HGB BLD-MCNC: 15.1 G/DL (ref 13.3–17.7)
HGB UR QL STRIP: NEGATIVE
IMM GRANULOCYTES # BLD: 0 10E3/UL
IMM GRANULOCYTES NFR BLD: 1 %
INTERPRETATION ECG - MUSE: NORMAL
KETONES UR STRIP-MCNC: NEGATIVE MG/DL
LACTATE SERPL-SCNC: 2 MMOL/L (ref 0.7–2)
LEUKOCYTE ESTERASE UR QL STRIP: NEGATIVE
LIPASE SERPL-CCNC: 66 U/L (ref 13–60)
LYMPHOCYTES # BLD AUTO: 0.8 10E3/UL (ref 0.8–5.3)
LYMPHOCYTES NFR BLD AUTO: 11 %
MAGNESIUM SERPL-MCNC: 1.9 MG/DL (ref 1.7–2.3)
MCH RBC QN AUTO: 30.8 PG (ref 26.5–33)
MCHC RBC AUTO-ENTMCNC: 33.6 G/DL (ref 31.5–36.5)
MCV RBC AUTO: 92 FL (ref 78–100)
MONOCYTES # BLD AUTO: 0.3 10E3/UL (ref 0–1.3)
MONOCYTES NFR BLD AUTO: 5 %
MUCOUS THREADS #/AREA URNS LPF: PRESENT /LPF
NEUTROPHILS # BLD AUTO: 6.1 10E3/UL (ref 1.6–8.3)
NEUTROPHILS NFR BLD AUTO: 83 %
NITRATE UR QL: NEGATIVE
NRBC # BLD AUTO: 0 10E3/UL
NRBC BLD AUTO-RTO: 0 /100
OPIATES UR QL SCN: NORMAL
P AXIS - MUSE: 41 DEGREES
PH UR STRIP: 7 [PH] (ref 5–7)
PLATELET # BLD AUTO: 223 10E3/UL (ref 150–450)
POTASSIUM SERPL-SCNC: 4 MMOL/L (ref 3.4–5.3)
POTASSIUM SERPL-SCNC: 6.3 MMOL/L (ref 3.4–5.3)
PR INTERVAL - MUSE: 220 MS
PROT SERPL-MCNC: 6.7 G/DL (ref 6.4–8.3)
PROT SERPL-MCNC: ABNORMAL G/DL
QRS DURATION - MUSE: 96 MS
QT - MUSE: 420 MS
QTC - MUSE: 408 MS
R AXIS - MUSE: -14 DEGREES
RBC # BLD AUTO: 4.9 10E6/UL (ref 4.4–5.9)
RBC URINE: 1 /HPF
SODIUM SERPL-SCNC: 135 MMOL/L (ref 136–145)
SODIUM SERPL-SCNC: 137 MMOL/L (ref 136–145)
SP GR UR STRIP: 1.01 (ref 1–1.03)
SYSTOLIC BLOOD PRESSURE - MUSE: NORMAL MMHG
T AXIS - MUSE: 31 DEGREES
UROBILINOGEN UR STRIP-MCNC: NORMAL MG/DL
VENTRICULAR RATE- MUSE: 57 BPM
WBC # BLD AUTO: 7.2 10E3/UL (ref 4–11)
WBC URINE: <1 /HPF

## 2022-08-28 PROCEDURE — 99242 OFF/OP CONSLTJ NEW/EST SF 20: CPT | Mod: GC | Performed by: STUDENT IN AN ORGANIZED HEALTH CARE EDUCATION/TRAINING PROGRAM

## 2022-08-28 PROCEDURE — 80053 COMPREHEN METABOLIC PANEL: CPT | Performed by: EMERGENCY MEDICINE

## 2022-08-28 PROCEDURE — 76705 ECHO EXAM OF ABDOMEN: CPT | Mod: 26 | Performed by: RADIOLOGY

## 2022-08-28 PROCEDURE — 258N000003 HC RX IP 258 OP 636: Performed by: EMERGENCY MEDICINE

## 2022-08-28 PROCEDURE — 93010 ELECTROCARDIOGRAM REPORT: CPT | Performed by: EMERGENCY MEDICINE

## 2022-08-28 PROCEDURE — 96375 TX/PRO/DX INJ NEW DRUG ADDON: CPT | Performed by: EMERGENCY MEDICINE

## 2022-08-28 PROCEDURE — 99285 EMERGENCY DEPT VISIT HI MDM: CPT | Mod: 25 | Performed by: EMERGENCY MEDICINE

## 2022-08-28 PROCEDURE — 85025 COMPLETE CBC W/AUTO DIFF WBC: CPT | Performed by: EMERGENCY MEDICINE

## 2022-08-28 PROCEDURE — 83735 ASSAY OF MAGNESIUM: CPT | Performed by: EMERGENCY MEDICINE

## 2022-08-28 PROCEDURE — 74177 CT ABD & PELVIS W/CONTRAST: CPT

## 2022-08-28 PROCEDURE — 74177 CT ABD & PELVIS W/CONTRAST: CPT | Mod: 26 | Performed by: RADIOLOGY

## 2022-08-28 PROCEDURE — 36415 COLL VENOUS BLD VENIPUNCTURE: CPT | Performed by: EMERGENCY MEDICINE

## 2022-08-28 PROCEDURE — 250N000011 HC RX IP 250 OP 636: Performed by: EMERGENCY MEDICINE

## 2022-08-28 PROCEDURE — 93005 ELECTROCARDIOGRAM TRACING: CPT | Performed by: EMERGENCY MEDICINE

## 2022-08-28 PROCEDURE — 81001 URINALYSIS AUTO W/SCOPE: CPT | Performed by: EMERGENCY MEDICINE

## 2022-08-28 PROCEDURE — 83605 ASSAY OF LACTIC ACID: CPT | Performed by: EMERGENCY MEDICINE

## 2022-08-28 PROCEDURE — 80307 DRUG TEST PRSMV CHEM ANLYZR: CPT | Performed by: EMERGENCY MEDICINE

## 2022-08-28 PROCEDURE — 96374 THER/PROPH/DIAG INJ IV PUSH: CPT | Performed by: EMERGENCY MEDICINE

## 2022-08-28 PROCEDURE — 83690 ASSAY OF LIPASE: CPT | Performed by: EMERGENCY MEDICINE

## 2022-08-28 PROCEDURE — C9113 INJ PANTOPRAZOLE SODIUM, VIA: HCPCS | Performed by: EMERGENCY MEDICINE

## 2022-08-28 PROCEDURE — 76705 ECHO EXAM OF ABDOMEN: CPT

## 2022-08-28 PROCEDURE — 96361 HYDRATE IV INFUSION ADD-ON: CPT | Performed by: EMERGENCY MEDICINE

## 2022-08-28 PROCEDURE — 82040 ASSAY OF SERUM ALBUMIN: CPT | Performed by: EMERGENCY MEDICINE

## 2022-08-28 RX ORDER — DROPERIDOL 2.5 MG/ML
0.62 INJECTION, SOLUTION INTRAMUSCULAR; INTRAVENOUS ONCE
Status: COMPLETED | OUTPATIENT
Start: 2022-08-28 | End: 2022-08-28

## 2022-08-28 RX ORDER — HYDROMORPHONE HYDROCHLORIDE 1 MG/ML
0.5 INJECTION, SOLUTION INTRAMUSCULAR; INTRAVENOUS; SUBCUTANEOUS ONCE
Status: COMPLETED | OUTPATIENT
Start: 2022-08-28 | End: 2022-08-28

## 2022-08-28 RX ORDER — LORAZEPAM 2 MG/ML
0.5 INJECTION INTRAMUSCULAR ONCE
Status: COMPLETED | OUTPATIENT
Start: 2022-08-28 | End: 2022-08-28

## 2022-08-28 RX ORDER — ONDANSETRON 2 MG/ML
8 INJECTION INTRAMUSCULAR; INTRAVENOUS ONCE
Status: COMPLETED | OUTPATIENT
Start: 2022-08-28 | End: 2022-08-28

## 2022-08-28 RX ORDER — LORAZEPAM 2 MG/ML
0.5 INJECTION INTRAMUSCULAR ONCE
Status: DISCONTINUED | OUTPATIENT
Start: 2022-08-28 | End: 2022-08-28

## 2022-08-28 RX ORDER — IOPAMIDOL 755 MG/ML
128 INJECTION, SOLUTION INTRAVASCULAR ONCE
Status: COMPLETED | OUTPATIENT
Start: 2022-08-28 | End: 2022-08-28

## 2022-08-28 RX ORDER — SODIUM CHLORIDE 9 MG/ML
INJECTION, SOLUTION INTRAVENOUS CONTINUOUS
Status: DISCONTINUED | OUTPATIENT
Start: 2022-08-28 | End: 2022-08-28 | Stop reason: HOSPADM

## 2022-08-28 RX ADMIN — PANTOPRAZOLE SODIUM 40 MG: 40 INJECTION, POWDER, FOR SOLUTION INTRAVENOUS at 07:55

## 2022-08-28 RX ADMIN — SODIUM CHLORIDE: 9 INJECTION, SOLUTION INTRAVENOUS at 09:56

## 2022-08-28 RX ADMIN — LORAZEPAM 0.5 MG: 2 INJECTION INTRAMUSCULAR; INTRAVENOUS at 09:05

## 2022-08-28 RX ADMIN — DROPERIDOL 0.62 MG: 2.5 INJECTION, SOLUTION INTRAMUSCULAR; INTRAVENOUS at 08:53

## 2022-08-28 RX ADMIN — IOPAMIDOL 128 ML: 755 INJECTION, SOLUTION INTRAVENOUS at 10:54

## 2022-08-28 RX ADMIN — HYDROMORPHONE HYDROCHLORIDE 0.5 MG: 1 INJECTION, SOLUTION INTRAMUSCULAR; INTRAVENOUS; SUBCUTANEOUS at 11:31

## 2022-08-28 RX ADMIN — SODIUM CHLORIDE 1000 ML: 9 INJECTION, SOLUTION INTRAVENOUS at 07:58

## 2022-08-28 RX ADMIN — ONDANSETRON 8 MG: 2 INJECTION INTRAMUSCULAR; INTRAVENOUS at 07:56

## 2022-08-28 ASSESSMENT — ACTIVITIES OF DAILY LIVING (ADL)
ADLS_ACUITY_SCORE: 35

## 2022-08-28 NOTE — ED TRIAGE NOTES
" Triage Assessment & Note:    BP (!) 150/95   Pulse 57   Temp 97.6  F (36.4  C) (Temporal)   Resp 16   Ht 1.727 m (5' 8\")   Wt 95.3 kg (210 lb)   BMI 31.93 kg/m      Patient presents with: PT presents with abd pain that started several hours ago. PT reports this is not a new thing and he had a recent biopsy.     Home Treatments/Remedies: None    Febrile / Afebrile? Afebrile     Duration of C/o:  2-4 hrs     Zion Shabazz RN  August 28, 2022         Triage Assessment     Row Name 08/28/22 0703       Triage Assessment (Adult)    Airway WDL WDL       Respiratory WDL    Respiratory WDL WDL       Cardiac WDL    Cardiac WDL WDL              "

## 2022-08-28 NOTE — DISCHARGE INSTRUCTIONS
Please make an appointment to follow up with [facility](phone: [phone]) they should contact you early next week.  Call them if you have not heard from them by Tuesday afternoon.    Clear liquid diet, advance to bland, fat-free diet as tolerated.  Avoid spicy or fatty foods.    Return to the emergency department for fevers, worsening symptoms or any other problems.

## 2022-08-28 NOTE — CONSULTS
EGS Consult Note  Date: 08/28/2022       Assessment/Recommendations:  Rodolfo Bennett is a 42 year old male who presented to [facility] on 8/28/2022 d/2 abdominal pain, nausea, vomiting. Occurs post-prandially - especially after eating spicy/greasy foods. They have no significant pmh - they have had this issue for the past 2 months and w/up has been grossly negative. EGS was consulted d/2 c/f biliary colic.    Vitals stable and wnl. Physical exam grossly benign. Labs demonstrated normal WBC - electrolytes w/ signs of dehydration. Tbili wnl (0.3). Imaging demonstrated a stone in the gallbladder neck.    History, imaging, and exam c/w symptomatic cholelithiasis.    -no urgent/emergent surgical indication  -surgery clinic referral for laparoscopic cholecystectomy eval  -pt wants to defer surgery until he has time to discuss w/ family  -discussed w/ Dr. Mervin Llanos MD (Chief) and staff    Dispo: discharge per ED    TAYLER GOYAL MD  PGY2, Surgery    HPI:   Rodolfo Bennett is a 42 year old male who presented to [facility] on 8/28/2022 d/2 abdominal pain, nausea, vomiting x1day. They have no significant pmh.     EGS was consulted d/2 c/f biliary colic - stone seen in neck of GB on CT.    -has on-off episodes of emesis for past 2 months  -already underwent EGD & H pylori testing - (-) H pylori, has some gastritis  -pain, nausea, vomiting occurs always post-prandially when eating spicy/greasy foods  -denies any symptoms when eating bland food (rice w/ boiled chicken per pt)  -denies fevers, chills, changes in bowel habits or bloody BMs  -last ate on 8/27/2022 6PM    ROS:  10 system ROS negative unless otherwise indicated    PMH:  Rodolfo Bennett has a past medical history of Allergic state and Anxiety.    PSH:  Rodolfo Bennett has a past surgical history that includes Esophagoscopy, gastroscopy, duodenoscopy (EGD), combined (N/A, 8/22/2022).    ALLERGIES:  Dust mites    FamHx:  Rodolfo Bennett's family history includes Diabetes in his  mother; Unknown/Adopted in his father.    SocHx:  Rodolfo Bennett reports that he has never smoked. He has never used smokeless tobacco. He reports that he does not drink alcohol and does not use drugs.      Objective:  Vitals:  B/P: 125/83, T: 97.6, P: 56, R: 18    Temp: 97.6  F (36.4  C) Temp  Min: 97.6  F (36.4  C)  Max: 97.6  F (36.4  C)  Resp: 18 Resp  Min: 16  Max: 18  SpO2: 100 % SpO2  Min: 100 %  Max: 100 %  Pulse: 56 Pulse  Min: 56  Max: 57    No data recorded  BP: 125/83 Systolic (24hrs), Av , Min:125 , Max:150   Diastolic (24hrs), Av, Min:83, Max:95      No intake or output data in the 24 hours ending 22 1140    Physical Exam:  Gen: well-dress; NAD  N:AOx3  HEENT: trachea midline, atraumatic, anicteric  P: normal WOB on RA  CV: RRR on monitor; no JVD  GI: soft, ND, NTTP, no guarding, no rigidity - nonperitonitic  : deferred  MSK: moves all extremties  Extremities: WWP    Labs:  Recent Labs   Lab 22  1010   WBC 7.2   HGB 15.1        Recent Labs   Lab 22  0726   *   POTASSIUM 6.3*   CHLORIDE 100   CO2 25   BUN 11.0   CR 0.91   *     Recent Labs   Lab 22  0726   MAG 1.9     Recent Labs   Lab 22  0726   BILITOTAL 0.3   ALBUMIN 4.4     Recent Labs   Lab 22  0726   LACT 2.0   LIPASE 66*       Studies:  Recent Results (from the past 24 hour(s))   CT Abdomen Pelvis w Contrast    Impression    RESIDENT PRELIMINARY INTERPRETATION  IMPRESSION:   1. No acute findings in the abdomen or pelvis.    2. Gallbladder stone the gallbladder neck. No wall thickening or  pericholecystic fluid to suggest acute cholecystitis.  3. Small to moderate size hiatal hernia.           Attending Attestation.    Patient has been seen by me, I have performed a physical exam, and I have talked to the resident and primary team about the care of this pt and my findings and recommendations.    -pt is no longer in pain and wishes to f/u outpt in our clinic for cholecystectomy when  he is in a better position in life  -given resolution of pain, minimal to no gallbladder thickening upon my review of the images, his willingness to follow up and desire to delay an operation, and he appears to have the ability to follow up and return to the hospital if he were to worsen, I believe it is appropriate to have him come to clinic for cholecystectomy  - I have given him warnings and recommendations to return if he were to worsen or if he were to simply choose so, likewise I have recommended against fatty and spicy foods or any other foods that he has noticed could be causing the problem at hand  -pt voiced understanding and wished to proceed with intended plan of following up outpt  - I have also explained to the patient the process of cholecystitis and risks of not proceeding with a cholecystectomy while cholecystitis is present, and given his lack of tenderness to my palpation it is unlikely that he has active acute cholecystitis   -we have given him the ACS packet for cholecystectomy as well     Alisa Armenta MD on 8/28/2022 at 1:15 PM

## 2022-08-28 NOTE — ED PROVIDER NOTES
ED Provider Note  [facility]      History     Chief Complaint   Patient presents with     Abdominal Pain     HPI  Rodolfo Bennett is a 42 year old male who  has a past medical history of Allergic state and Anxiety.  He presents to the emergency department complaining of onset of epigastric abdominal pain, nausea and vomiting that began early this morning.  This has occurred multiple times over the past 2 months and patient has been worked up including EGD that was negative for H. pylori but did demonstrate some findings consistent with gastritis.  No evidence of malignancy was noted.  Patient denies THC or marijuana use.  He does, however, report that hot showers do improve his symptoms.  This morning he is unable to hold anything down.  Denies fevers, blood in emesis, and has no other complaints.      Past Medical History  Past Medical History:   Diagnosis Date     Allergic state      Anxiety      Past Surgical History:   Procedure Laterality Date     ESOPHAGOSCOPY, GASTROSCOPY, DUODENOSCOPY (EGD), COMBINED N/A 8/22/2022    Procedure: ESOPHAGOGASTRODUODENOSCOPY, WITH BIOPSY;  Surgeon: Anson Mercer MD;  Location:  GI     pantoprazole (PROTONIX) 40 MG EC tablet  sucralfate (CARAFATE) 1 GM/10ML suspension  triamcinolone (KENALOG) 0.1 % external cream      Allergies   Allergen Reactions     Dust Mites      Family History  Family History   Problem Relation Age of Onset     Diabetes Mother      Unknown/Adopted Father      Social History   Social History     Tobacco Use     Smoking status: Never Smoker     Smokeless tobacco: Never Used   Vaping Use     Vaping Use: Never used   Substance Use Topics     Alcohol use: No     Drug use: No      Past medical history, past surgical history, medications, allergies, family history, and social history were reviewed with the patient. No additional pertinent items.       Review of Systems  A complete review of systems was performed with pertinent  "positives and negatives noted in the HPI, and all other systems negative.    Physical Exam   BP: (!) 150/95  Pulse: 57  Temp: 97.6  F (36.4  C)  Resp: 16  Height: 172.7 cm (5' 8\")  Weight: 95.3 kg (210 lb)  SpO2: 100 %  Physical Exam  Vitals and nursing note reviewed.   Constitutional:       General: He is not in acute distress.     Appearance: He is well-developed. He is not ill-appearing, toxic-appearing or diaphoretic.      Comments: Patient is awake and alert, he is sitting in the chair in triage actively retching.  He is mentating normally otherwise appears in no acute distress.   HENT:      Head: Normocephalic and atraumatic.      Mouth/Throat:      Lips: Pink.      Mouth: Mucous membranes are moist.      Pharynx: Oropharynx is clear. No oropharyngeal exudate.   Eyes:      General: Lids are normal. No scleral icterus.     Extraocular Movements: Extraocular movements intact.      Right eye: No nystagmus.      Left eye: No nystagmus.      Conjunctiva/sclera: Conjunctivae normal.      Pupils: Pupils are equal, round, and reactive to light.   Neck:      Thyroid: No thyromegaly.      Vascular: No JVD.      Trachea: No tracheal deviation.   Cardiovascular:      Rate and Rhythm: Normal rate and regular rhythm.      Pulses: Normal pulses.      Heart sounds: Normal heart sounds. No murmur heard.    No friction rub. No gallop.   Pulmonary:      Effort: Pulmonary effort is normal. No respiratory distress.      Breath sounds: Normal breath sounds.   Abdominal:      General: Bowel sounds are normal. There is no distension.      Palpations: Abdomen is soft. There is no mass.      Tenderness: There is abdominal tenderness ( Mild tenderness to palpation in epigastric area without rebound, guarding or other peritoneal signs.) in the epigastric area. There is no guarding or rebound.   Musculoskeletal:         General: No tenderness. Normal range of motion.      Cervical back: Normal range of motion and neck supple. No erythema " or rigidity.      Right lower leg: No edema.      Left lower leg: No edema.   Lymphadenopathy:      Cervical: No cervical adenopathy.   Skin:     General: Skin is warm and dry.      Capillary Refill: Capillary refill takes less than 2 seconds.      Coloration: Skin is not pale.      Findings: No erythema or rash.   Neurological:      Mental Status: He is alert and oriented to person, place, and time.      Cranial Nerves: No cranial nerve deficit.      Sensory: No sensory deficit.      Motor: Motor function is intact.   Psychiatric:         Mood and Affect: Mood and affect normal.         Speech: Speech normal.         Behavior: Behavior normal.         ED Course      Procedures            EKG Interpretation:      Interpreted by Garland Deshpande MD    Symptoms at time of EKG: Vomiting, droperidol therapy  Rhythm: Sinus bradycardia  Rate: 57  Ectopy: none  Conduction: 1st degree AV block  ST Segments/ T Waves: No ST-T wave changes and No acute ischemic changes  Q Waves: none  Comparison to prior: No old EKG available    Clinical Impression: Sinus bradycardia with first-degree AV block, normal QT interval                          Results for orders placed or performed during the hospital encounter of 08/28/22   CT Abdomen Pelvis w Contrast     Status: None    Narrative    EXAMINATION: CT ABDOMEN PELVIS W CONTRAST, 8/28/2022 11:03 AM    TECHNIQUE:  Helical CT images from the lung bases through the  symphysis pubis were obtained with IV contrast. Contrast dose: 128 cc  isovue 370    COMPARISON: None    HISTORY: severe abdominal pain    FINDINGS:    Lung bases: Patchy basilar groundglass opacities, most likely  atelectasis.    Liver: Normal parenchymal attenuation without focal mass.  Biliary system: Cholelithiasis at the gallbladder neck. No wall  thickening or pericholecystic fluid.. No intrahepatic or extrahepatic  biliary ductal dilatation.  Pancreas: No focal mass or dilation of the main pancreatic  duct.  Spleen: Within normal limits.  Adrenal glands: Within normal limits.  Kidneys: No focal mass, hydronephrosis, or stone.  Bladder: Within normal limits.  Reproductive organs: Mild prostatomegaly with median lobe indenting  the bladder.  GI: Small to moderate hiatal hernia. Few scattered colonic diverticula  without evidence of diverticulitis. The appendix is normal.  Lymph nodes: No intra-abdominal or pelvic lymphadenopathy.  Vasculature: Within normal limits.    Bones and soft tissues: No suspicious soft tissue mass or fluid  collection. No suspicious osseous lesion. Degenerative changes in the  spine.      Impression    IMPRESSION:   1. No acute findings in the abdomen or pelvis.    2. Gallbladder stone the gallbladder neck. No wall thickening or  pericholecystic fluid to suggest acute cholecystitis.  3. Small to moderate size hiatal hernia.  4. Mild prostatomegaly.    I have personally reviewed the examination and initial interpretation  and I agree with the findings.    MONROE NATION MD         SYSTEM ID:  [id number]   US Abdomen Limited (RUQ)     Status: None (Preliminary result)    Impression    RESIDENT PRELIMINARY INTERPRETATION  IMPRESSION:   Fixed gallstone at the gallbladder neck with mild distention of the  gallbladder. No wall thickening or pericholecystic fluid.   Comprehensive metabolic panel     Status: Abnormal   Result Value Ref Range    Sodium 135 (L) 136 - 145 mmol/L    Potassium 6.3 (HH) 3.4 - 5.3 mmol/L    Creatinine 0.91 0.67 - 1.17 mg/dL    Urea Nitrogen 11.0 6.0 - 20.0 mg/dL    Chloride 100 98 - 107 mmol/L    Carbon Dioxide (CO2) 25 22 - 29 mmol/L    Anion Gap 10 7 - 15 mmol/L    Glucose 145 (H) 70 - 99 mg/dL    Calcium 9.1 8.6 - 10.0 mg/dL    Protein Total      Albumin 4.4 3.5 - 5.2 g/dL    Bilirubin Total 0.3 <=1.2 mg/dL    Alkaline Phosphatase      AST      ALT      GFR Estimate >90 >60 mL/min/1.73m2   Lipase     Status: Abnormal   Result Value Ref Range    Lipase 66 (H) 13 - 60 U/L    Magnesium     Status: Normal   Result Value Ref Range    Magnesium 1.9 1.7 - 2.3 mg/dL   Lactic acid whole blood     Status: Normal   Result Value Ref Range    Lactic Acid 2.0 0.7 - 2.0 mmol/L   CBC with platelets and differential     Status: None   Result Value Ref Range    WBC Count 7.2 4.0 - 11.0 10e3/uL    RBC Count 4.90 4.40 - 5.90 10e6/uL    Hemoglobin 15.1 13.3 - 17.7 g/dL    Hematocrit 45.0 40.0 - 53.0 %    MCV 92 78 - 100 fL    MCH 30.8 26.5 - 33.0 pg    MCHC 33.6 31.5 - 36.5 g/dL    RDW 12.9 10.0 - 15.0 %    Platelet Count 223 150 - 450 10e3/uL    % Neutrophils 83 %    % Lymphocytes 11 %    % Monocytes 5 %    % Eosinophils 0 %    % Basophils 0 %    % Immature Granulocytes 1 %    NRBCs per 100 WBC 0 <1 /100    Absolute Neutrophils 6.1 1.6 - 8.3 10e3/uL    Absolute Lymphocytes 0.8 0.8 - 5.3 10e3/uL    Absolute Monocytes 0.3 0.0 - 1.3 10e3/uL    Absolute Eosinophils 0.0 0.0 - 0.7 10e3/uL    Absolute Basophils 0.0 0.0 - 0.2 10e3/uL    Absolute Immature Granulocytes 0.0 <=0.4 10e3/uL    Absolute NRBCs 0.0 10e3/uL   UA with Microscopic reflex to Culture     Status: Abnormal    Specimen: Urine, Midstream   Result Value Ref Range    Color Urine Straw Colorless, Straw, Light Yellow, Yellow    Appearance Urine Clear Clear    Glucose Urine Negative Negative mg/dL    Bilirubin Urine Negative Negative    Ketones Urine Negative Negative mg/dL    Specific Gravity Urine 1.012 1.003 - 1.035    Blood Urine Negative Negative    pH Urine 7.0 5.0 - 7.0    Protein Albumin Urine Negative Negative mg/dL    Urobilinogen Urine Normal Normal, 2.0 mg/dL    Nitrite Urine Negative Negative    Leukocyte Esterase Urine Negative Negative    Bacteria Urine Few (A) None Seen /HPF    Mucus Urine Present (A) None Seen /LPF    RBC Urine 1 <=2 /HPF    WBC Urine <1 <=5 /HPF    Narrative    Urine Culture not indicated   Comprehensive metabolic panel     Status: Abnormal   Result Value Ref Range    Sodium 137 136 - 145 mmol/L    Potassium 4.0  3.4 - 5.3 mmol/L    Creatinine 0.83 0.67 - 1.17 mg/dL    Urea Nitrogen 10.9 6.0 - 20.0 mg/dL    Chloride 104 98 - 107 mmol/L    Carbon Dioxide (CO2) 20 (L) 22 - 29 mmol/L    Anion Gap 13 7 - 15 mmol/L    Glucose 123 (H) 70 - 99 mg/dL    Calcium 8.6 8.6 - 10.0 mg/dL    Protein Total 6.7 6.4 - 8.3 g/dL    Albumin 4.0 3.5 - 5.2 g/dL    Bilirubin Total 0.2 <=1.2 mg/dL    Alkaline Phosphatase 71 40 - 129 U/L    AST 25 10 - 50 U/L    ALT 35 10 - 50 U/L    GFR Estimate >90 >60 mL/min/1.73m2   Drug abuse screen 1 urine (ED)     Status: Normal   Result Value Ref Range    Amphetamines Urine Screen Negative Screen Negative    Barbituates Urine Screen Negative Screen Negative    Benzodiazepine Urine Screen Negative Screen Negative    Cannabinoids Urine Screen Negative Screen Negative    Cocaine Urine Screen Negative Screen Negative    Opiates Urine Screen Negative Screen Negative   EKG 12-lead, complete     Status: None   Result Value Ref Range    Systolic Blood Pressure  mmHg    Diastolic Blood Pressure  mmHg    Ventricular Rate 57 BPM    Atrial Rate 57 BPM    OH Interval 220 ms    QRS Duration 96 ms     ms    QTc 408 ms    P Axis 41 degrees    R AXIS -14 degrees    T Axis 31 degrees    Interpretation ECG       Sinus bradycardia with 1st degree A-V block  Nonspecific T wave abnormality  Abnormal ECG  Unconfirmed report - interpretation of this ECG is computer generated - see medical record for final interpretation    Confirmed by - EMERGENCY ROOM, PHYSICIAN ([id number]),  JOVANA HOUSTON ([id number]) on 8/28/2022 8:39:23 AM     CBC with platelets differential     Status: None    Narrative    The following orders were created for panel order CBC with platelets differential.  Procedure                               Abnormality         Status                     ---------                               -----------         ------                     CBC with platelets and d...[[id number]]                      Final result                  Please view results for these tests on the individual orders.   Urine Drugs of Abuse Screen     Status: Normal    Narrative    The following orders were created for panel order Urine Drugs of Abuse Screen.  Procedure                               Abnormality         Status                     ---------                               -----------         ------                     Drug abuse screen 1 urin...[[id number]]  Normal              Final result                 Please view results for these tests on the individual orders.     Medications   0.9% sodium chloride BOLUS (0 mLs Intravenous Stopped 8/28/22 0956)     Followed by   sodium chloride 0.9% infusion (0 mLs Intravenous Stopped 8/28/22 1128)   pantoprazole (PROTONIX) IV push injection 40 mg (40 mg Intravenous Given 8/28/22 0755)   ondansetron (ZOFRAN) injection 8 mg (8 mg Intravenous Given 8/28/22 0756)   droperidol (INAPSINE) injection 0.625 mg (0.625 mg Intravenous Given 8/28/22 0853)   LORazepam (ATIVAN) injection 0.5 mg (0.5 mg Intravenous Given 8/28/22 0905)   iopamidol (ISOVUE-370) solution 128 mL (128 mLs Intravenous Given 8/28/22 1054)   sodium chloride (PF) 0.9% PF flush 82 mL (82 mLs Intravenous Given 8/28/22 1054)   HYDROmorphone (PF) (DILAUDID) injection 0.5 mg (0.5 mg Intravenous Given 8/28/22 1131)        Assessments & Plan (with Medical Decision Making)     This patient presented to the emergency department complaining of epigastric pain, nausea and vomiting.  Abdominal exam is without peritoneal signs.  Vital signs are within normal limits.  IV was established and blood work was sent demonstrating no elevation of lipase or LFTs to suggest acute hepatobiliary or pancreatic process.  No significant leukocytosis.  He was given a dose of IV Protonix and Zofran but continued to have symptoms.  Twelve-lead EKG was obtained and did not demonstrate a prolonged QT interval so patient was given a dose of droperidol IV.  Patient continued to  complain of pain and appeared uncomfortable.  He did receive IV Dilaudid and I obtained a CT scan which demonstrated a gallbladder neck stone without signs of cholecystitis.  Suspect the patient symptoms are secondary to biliary colic and symptomatic cholelithiasis and I have consulted with surgery and obtained a right upper quadrant ultrasound.  Patient feels much improved at this point and will follow-up in the general surgery clinic to discuss cholecystectomy.  He was told to return for any further problems and was discharged in improved condition.    I have reviewed the nursing notes. I have reviewed the findings, diagnosis, plan and need for follow up with the patient.    New Prescriptions    No medications on file       Final diagnoses:   Symptomatic cholelithiasis       --  Garland BALL formerly [facility] EMERGENCY DEPARTMENT  8/28/2022     Garland Deshpande MD  08/28/22 [id number]

## 2022-08-29 ENCOUNTER — TELEPHONE (OUTPATIENT)
Dept: PEDIATRICS | Facility: CLINIC | Age: 42
End: 2022-08-29

## 2022-08-29 NOTE — TELEPHONE ENCOUNTER
Patient calling wanting to schedule appointment with provider. Patient seen in ED yesterday. Was informed he has gallstones and was advised to have surgery. Patient does not want to have surgery, patient wants to discuss alternatives. Patent states he is a single father, does not feel he would be able to take off work and have support for recovery time. Patient not experiencing any current symptoms. Scheduled for ED follow up appointment 8/31/22. RN advised if any new/worsening symptoms prior to upcoming appointment to be seen in ED or contact clinic. RN advised low fat diet, plenty of fluids prior to upcoming appointment. Patient verbalized understanding and agreed with plan.     Nathna KENNY RN

## 2022-08-31 ENCOUNTER — OFFICE VISIT (OUTPATIENT)
Dept: PEDIATRICS | Facility: CLINIC | Age: 42
End: 2022-08-31
Payer: COMMERCIAL

## 2022-08-31 VITALS
RESPIRATION RATE: 18 BRPM | TEMPERATURE: 98.6 F | WEIGHT: 209.9 LBS | HEIGHT: 68 IN | SYSTOLIC BLOOD PRESSURE: 109 MMHG | OXYGEN SATURATION: 97 % | BODY MASS INDEX: 31.81 KG/M2 | HEART RATE: 66 BPM | DIASTOLIC BLOOD PRESSURE: 72 MMHG

## 2022-08-31 DIAGNOSIS — K80.20 CALCULUS OF GALLBLADDER WITHOUT CHOLECYSTITIS WITHOUT OBSTRUCTION: ICD-10-CM

## 2022-08-31 DIAGNOSIS — K80.50 BILIARY COLIC: Primary | ICD-10-CM

## 2022-08-31 DIAGNOSIS — K29.70 GASTRITIS WITHOUT BLEEDING, UNSPECIFIED CHRONICITY, UNSPECIFIED GASTRITIS TYPE: ICD-10-CM

## 2022-08-31 PROBLEM — K80.81 BILIARY CALCULUS OF OTHER SITE WITH OBSTRUCTION: Status: ACTIVE | Noted: 2022-08-31

## 2022-08-31 PROCEDURE — 99214 OFFICE O/P EST MOD 30 MIN: CPT | Performed by: INTERNAL MEDICINE

## 2022-08-31 ASSESSMENT — PAIN SCALES - GENERAL: PAINLEVEL: NO PAIN (0)

## 2022-08-31 NOTE — PROGRESS NOTES
Assessment & Plan     (K80.50) Biliary colic  (primary encounter diagnosis)  (K80.20) Calculus of gallbladder without cholecystitis without obstruction  Comment:   Plan: Cholelithiasis with biliary colic.  Patient has several questions today regarding gallbladder disease and pathophysiology. He has a referral to general surgery  to discuss possible cholecystectomy  Reviewed dietary triggers    (K29.70) Gastritis without bleeding, unspecified chronicity, unspecified gastritis type  Comment:   Plan: Symptoms improving, continue PPI, Carafate.    Kennedy Loredo MD  [facility] SETH Reynolds is a 42 year old, presenting for the following health issues:    HPI     ED/UC Followup:    Facility: [facility]  Date of visit: 8/2822  Reason for visit:  abdominal pain  Current Status: stable    42-year-old male presents today for follow-up of recent emergency department visit with complaints of epigastric pain nausea and vomiting.  Has had similar episodes over the past few months.  Recent work-up included EGD, negative for H. pylori, did demonstrate gastritis.  Seen in the emergency department on 8/28.  Work-up included CT abdomen and pelvis with contrast-no acute findings did demonstrate a gallstone in the gallbladder neck without evidence of cholecystitis as well as moderate hiatal hernia.  Ultrasound obtained demonstrated gallstone in the cystic duct with mild distention noted.    Patient continues to be symptomatic-notes right upper quadrant discomfort associated with certain foods/diet contains a fair amount of fast food which triggers symptoms.    Patient Active Problem List   Diagnosis     Hypertrophy of breast     Positive PPD     Anxiety state     Depressive disorder, not elsewhere classified     H. pylori infection     Non morbid obesity due to excess calories     Biliary calculus of other site with obstruction     Current Outpatient Medications   Medication Sig  "Dispense Refill     pantoprazole (PROTONIX) 40 MG EC tablet Take 1 tablet (40 mg) by mouth daily 30 tablet 3     sucralfate (CARAFATE) 1 GM/10ML suspension Take 10 mLs (1 g) by mouth 4 times daily 414 mL 0     triamcinolone (KENALOG) 0.1 % external cream Apply topically 2 times daily as needed for irritation 80 g 3          Objective    /72 (BP Location: Right arm, Patient Position: Sitting, Cuff Size: Adult Regular)   Pulse 66   Temp 98.6  F (37  C) (Tympanic)   Resp 18   Ht 1.727 m (5' 8\")   Wt 95.2 kg (209 lb 14.4 oz)   SpO2 97%   BMI 31.92 kg/m    Body mass index is 31.92 kg/m .  Physical Exam   GENERAL: healthy, alert and no distress  NECK: no adenopathy, no asymmetry, masses, or scars and thyroid normal to palpation  RESP: lungs clear to auscultation - no rales, rhonchi or wheezes  CV: regular rate and rhythm, normal S1 S2, no S3 or S4, no murmur, click or rub, no peripheral edema and peripheral pulses strong  ABDOMEN: soft, nontender, no hepatosplenomegaly, no masses and bowel sounds normal, without murphys sign  MS: no gross musculoskeletal defects noted, no edema  PSYCH: mentation appears normal, affect normal/bright      .  ..  "

## 2022-09-01 NOTE — TELEPHONE ENCOUNTER
REFERRAL INFORMATION:    Referring Provider:  Dr. Garland Deshpande     Referring Clinic:  [facility]     Reason for Visit/Diagnosis: Symptomatic elysia.        FUTURE VISIT INFORMATION:    Appointment Date: 9/6/2022    Appointment Time: 9 AM      NOTES RECORD STATUS  DETAILS   OFFICE NOTE from Referring Provider Internal 8/28/2022 ED    OFFICE NOTE from Other Specialists Internal 8/31/2022 Office visit with Dr. Kennedy Loredo ([facility])      HOSPITAL DISCHARGE SUMMARY/ ED VISITS  Internal 7/23/2022 ([facility])    OPERATIVE REPORT N/A    ENDOSCOPY (EGD)  N/A    PERTINENT LABS Internal    PATHOLOGY REPORTS (RELATED) N/A    IMAGING (CT, MRI, US, XR)  Internal US Abdomen: 8/28/2022  CT Abdomen Pelvis: 8/28/2022

## 2022-09-06 ENCOUNTER — PRE VISIT (OUTPATIENT)
Dept: SURGERY | Facility: CLINIC | Age: 42
End: 2022-09-06

## 2022-09-07 ENCOUNTER — TELEPHONE (OUTPATIENT)
Dept: SURGERY | Facility: CLINIC | Age: 42
End: 2022-09-07

## 2022-09-07 ENCOUNTER — OFFICE VISIT (OUTPATIENT)
Dept: SURGERY | Facility: CLINIC | Age: 42
End: 2022-09-07
Attending: EMERGENCY MEDICINE
Payer: COMMERCIAL

## 2022-09-07 VITALS
SYSTOLIC BLOOD PRESSURE: 99 MMHG | BODY MASS INDEX: 31.4 KG/M2 | DIASTOLIC BLOOD PRESSURE: 67 MMHG | OXYGEN SATURATION: 99 % | WEIGHT: 207.2 LBS | HEART RATE: 56 BPM | HEIGHT: 68 IN

## 2022-09-07 DIAGNOSIS — K80.20 SYMPTOMATIC CHOLELITHIASIS: ICD-10-CM

## 2022-09-07 PROCEDURE — 99213 OFFICE O/P EST LOW 20 MIN: CPT | Performed by: SURGERY

## 2022-09-07 RX ORDER — INDOCYANINE GREEN AND WATER 25 MG
2.5 KIT INJECTION ONCE
Status: CANCELLED | OUTPATIENT
Start: 2022-09-07 | End: 2022-09-07

## 2022-09-07 RX ORDER — CEFAZOLIN SODIUM 2 G/50ML
2 SOLUTION INTRAVENOUS
Status: CANCELLED | OUTPATIENT
Start: 2022-09-07

## 2022-09-07 RX ORDER — CEFAZOLIN SODIUM 2 G/50ML
2 SOLUTION INTRAVENOUS SEE ADMIN INSTRUCTIONS
Status: CANCELLED | OUTPATIENT
Start: 2022-09-07

## 2022-09-07 ASSESSMENT — PAIN SCALES - GENERAL: PAINLEVEL: NO PAIN (0)

## 2022-09-07 NOTE — NURSING NOTE
"Chief Complaint   Patient presents with     New Patient     Cholelithiasis       Vitals:    09/07/22 0831   BP: 99/67   BP Location: Left arm   Patient Position: Sitting   Cuff Size: Adult Large   Pulse: 56   SpO2: 99%   Weight: 94 kg (207 lb 3.2 oz)   Height: 1.727 m (5' 8\")       Body mass index is 31.5 kg/m .                          Kvng Castañeda, EMT    "

## 2022-09-07 NOTE — PROGRESS NOTES
"I saw Rodolfo Bennett today in clinic for ED follow-up for symptomatic cholelithiasis.  He was seen on 8/28 with severe abdominal pain. A CT scan and an US were performed- notable for a stone in the gallbladder neck.  His pain resolved and he was sent home.  He does have a history of gastritis for which he has had an EGD (H pylori negative).      Since leaving the ED he has had persistent abdominal pain after eating.  He also endorses reflux and nausea.  Sticking to a bland diet. Thinks he is losing weight due to diet restrictions.  No history of pancreatitis or jaundice    PMH: gastritis  PSH: none  All: NKDA  FamHx: two relatives with biliary disease      PE:  BP 99/67 (BP Location: Left arm, Patient Position: Sitting, Cuff Size: Adult Large)   Pulse 56   Ht 1.727 m (5' 8\")   Wt 94 kg (207 lb 3.2 oz)   SpO2 99%   BMI 31.50 kg/m      A+Ox3, NAD  RRR  No resp distress  No wheezing  Abd is soft, nondistended, nontender to palpation    I reviewed his US and his CT scan- these show a stone in the gallbladder neck.      A/P: biliary colic.  I discussed with him the risks/benefits of surgery. We reviewed imaging of the bilary tree and I pointed out the potential sites of injury (bile duct, liver, bowel), the risks of a dropped stone, bile leak, hernia, bleeding and infection.  We discussed possible post cholecystectomy syndrome.  He desires to proceed with surgery.    -To OR for cholecystectomy    "

## 2022-09-07 NOTE — TELEPHONE ENCOUNTER
Patient is scheduled for surgery with Dr. Morgan    Spoke with: Rodolfo    Date of Surgery: 9/9/22    Location: [facility]    Informed patient they will need an adult : Yes    Pre op with Provider: n/a    H&P: Scheduled with [facility] on 9/8/2022    Pre-procedure COVID-19 Test: Scheduled for 9/8    Additional imaging/appointments: n/a    Surgery packet: Will be mailed out to patient     Additional comments: n/a

## 2022-09-07 NOTE — NURSING NOTE
Pre and Post op Patient Education/Teaching Flowsheet  Relevant Diagnosis:  Cholelithiasis (K80.20)  Teaching Topic:  Pre and post op teaching  Person(s) Involved in teaching:  Patient     Motivation Level:  Asks Questions:  Yes  Eager to Learn:  Yes  Cooperative:  Yes  Receptive (willing/able to accept information):  Yes  Any cultural factors/Yarsani beliefs that may influence understanding or compliance?  No    Patient/caregiver/family demonstrates understanding of the following:  Reason for the appointment, diagnosis, and treatment plan:  Yes  Patient demonstrates understanding of the following:  Pre-op bowel prep:  N/A  Post-op pain management recommendations (medications, ice compress, binder/athletic supporter (if applicable), etc.:  Yes  Inguinal hernia patients:  Post-op urinary retention- discussed signs/symptoms and visit to ER for Zapata catheter placement and to stay in place for at least 48 hours:  NA  Restrictions:  Yes  Medications to take the day of surgery:  Per PCP  Blood thinner medications discussed and when to stop (if applicable):  Yes  Wound care:  Yes  Diabetes medication management (if applicable):  Per PCP  Which situations necessitate calling provider and whom to contact:  Discussed how to contact the hospital, nurse, and clinic scheduling staff if necessary      Date and time of surgery:  TBD  Location of surgery: [facility] [place]- 5th Floor  History and Physical and any other testing necessary prior to surgery:  Yes  Required time line for completion of History and Physical and any pre-op testing:  Yes  Discuss need for someone to drive patient home and stay with them for 24 hours:  Yes  Pre-op showering/scrub information with Surgical Scrub:  Yes  NPO Guidelines:  NPO per Anesthesia Guidelines  COVID-19 Testing:  Yes    Infection Prevention: Patient demonstrates understanding of the following:  Patient instructed on hand hygiene:  Yes  Surgical procedure  site care will be taught and will be reviewed at the time of discharge  Signs and symptoms of infection taught:  Yes  Wound care reviewed and will be taught at the time of discharge  Central venous catheter care will be taught at the time of discharge (if applicable)    Post-op follow-up:  Instructional materials used/given/mailed:  Surgical logistics, post op teaching sheet, and surgical scrub

## 2022-09-07 NOTE — LETTER
"9/7/2022     RE: Rodolfo Bennett  [address] [facility] [place] [address]     Dear Colleague,    Thank you for referring your patient, Rodolfo Bennett, to the [facility] [place] at [facility]. Please see a copy of my visit note below.    I saw Rodolfo Bennett today in clinic for ED follow-up for symptomatic cholelithiasis.  He was seen on 8/28 with severe abdominal pain. A CT scan and an US were performed- notable for a stone in the gallbladder neck.  His pain resolved and he was sent home.  He does have a history of gastritis for which he has had an EGD (H pylori negative).      Since leaving the ED he has had persistent abdominal pain after eating.  He also endorses reflux and nausea.  Sticking to a bland diet. Thinks he is losing weight due to diet restrictions.  No history of pancreatitis or jaundice    PMH: gastritis  PSH: none  All: NKDA  FamHx: two relatives with biliary disease      PE:  BP 99/67 (BP Location: Left arm, Patient Position: Sitting, Cuff Size: Adult Large)   Pulse 56   Ht 1.727 m (5' 8\")   Wt 94 kg (207 lb 3.2 oz)   SpO2 99%   BMI 31.50 kg/m      A+Ox3, NAD  RRR  No resp distress  No wheezing  Abd is soft, nondistended, nontender to palpation    I reviewed his US and his CT scan- these show a stone in the gallbladder neck.      A/P: biliary colic.  I discussed with him the risks/benefits of surgery. We reviewed imaging of the bilary tree and I pointed out the potential sites of injury (bile duct, liver, bowel), the risks of a dropped stone, bile leak, hernia, bleeding and infection.  We discussed possible post cholecystectomy syndrome.  He desires to proceed with surgery.    -To OR for cholecystectomy    Again, thank you for allowing me to participate in the care of your patient.      Sincerely,    Stephon Morgan MD    "

## 2022-09-07 NOTE — PATIENT INSTRUCTIONS
You met with Dr. Stephon Morgan.      Today's visit instructions:    Reminder:  Surgery Requirements  Your surgery will be at [facility] [place]- 5th Floor  You will need to arrive 1.5 hours early.  You will need someone to drive you home (over 18 years old) and stay with you for 24 hours after the procedure.  You will need a preop physical with your regular doctor within 30 days of surgery- closer is always better.  Stop any blood thinners, vitamins, minerals, or herbal supplements 5 days before surgery.  If you are taking a prescribed blood thinner please let us know for specific instructions.  Fasting- a nurse from [facility] will call you 1-2 days before surgery to confirm your procedure and tell you when to stop eating and drinking.   Wash with the soap (Antibacterial, Dial Complete Foam, Hibiclense, or soap given/mailed from the clinic) the night before surgery and morning of surgery. See instructions in the Surgery Packet.  You will need to undergo a COVID-19 test 2-4 days prior to your scheduled procedure.  Please see the handout . Our surgery scheduler can help arrange testing if you do not have a home test.  If you would like a procedure estimate please call Cost of Care at [phone].       If you have questions please contact Karen RN or Zulema RN during regular clinic hours, Monday through Friday 7:30 AM - 4:00 PM, or you can contact us via [facility] at anytime.       If you have urgent needs after-hours, weekends, or holidays please call the hospital at [phone] and ask to speak with our on-call General Surgery Team.    Appointment [phone]  Nurse Advice (Karen or Zulema): [phone]   Surgery Scheduler (Ryan): [phone]  Fax: [phone]      After your Robotic Cholecystectomy          Incision care   You may take a shower the day after surgery. Carefully wash your incision with soap and water. Do not submerge yourself in water (bath, whirlpool, hot  tub, pool, lake) for 14 days after surgery.   Remove the bandage the day after surgery, but leave the medical tape (Steri-Strips) or glue in place. These will loosen and fall off on their own 1-2 weeks after surgery.     Always wash your hands before touching your incisions or removing bandages.   It is not unusual to form a collection of fluid or blood under your incision that may feel firm or squishy- it can take several weeks to months for your body to reabsorb it.  At times, it may even drain.  If that should happen keep the area clean with soap, water,  and cover with a clean gauze dressing. You can change this daily or as needed.       Other medicines   Wait to start aspirin or blood thinners until the day after surgery. You can continue your regular medicines at your normal time the day after surgery.    Your pain medicine may cause constipation (hard, dry stools). To help with this, take the stool softener your doctor gave you or an over-the-counter stool softener or laxative. You can stop taking this when you are no longer taking pain medicine and your bowel movements are back to normal.      For pain or discomfort   Take the narcotic pain medicine your doctor gave you as needed and as instructed on the bottle. If you prefer to use over-the-counter medication, use acetaminophen (Tylenol) or ibuprofen (Advil, Motrin) as instructed on the box. Do not take Tylenol if it is in your narcotic pain medication.   Use an ice pack on your abdomen (belly) for 20 minutes at a time as needed for the first 24 hours. Be sure to protect your skin by putting a cloth between the ice pack and your skin.   After 24 hours you can switch to heat for 20 minutes as needed. Be sure to protect your skin by putting a cloth between the heat pack and your skin.   You may experience right shoulder pain after surgery which will go away 1-4 days after your procedure.  This is related to the gas that was used to inflate your abdomen, it  gets trapped between your liver and diaphragm.  Walk frequently and apply a heating pad (protecting your skin from the heating pad with a barrier such as a towel).       Activities   No driving until you feel it s safe to do so. Don t drive while taking narcotic pain medicine.   Don t lift anything heavier than 20 pounds for 3 to 4 weeks after surgery.      Special equipment   None     Diet   You can eat your regular meals after surgery.      When to call the doctor   Call your doctor if you have:   A fever above 101 F (38.3 C) (taken under the tongue), or a fever or chills lasting more than a day.   Redness at the incision site.   Any fluid or blood draining from the incision, especially if it smells bad.    Severe pain that doesn t improve with pain medicine.        We will call you 2 to 4 days after surgery to review this handout, answer questions and help arrange after-surgery care. If you have questions or concerns, please call [phone] during regular office hours. If you need to call after business hours, call [phone] and ask to page the surgeon on-call.         Transversus Abdominis Plane (TAP) Pain Block      What is a TAP block?   A TAP block can help you manage your pain after surgery. TAP stands for transversus abdominis plane, which is a muscle layer in your abdomen (belly). The TAP block uses numbing medicine similar to Novocaine to block pain near the site of your surgery.       Why get a TAP block?   To better manage your pain after surgery. A tap block will help keep the pain from getting severe and out of control.   To block pain signals from the nerve, which helps decrease pain after surgery.   To help you sleep, easily breathe deeply, walk and visit with others.      How is it done?   You will lie still on a table. We will use an ultrasound machine to help us see the correct muscle layer of your abdomen. Then, we ll use a needle to inject the medicine. We may also give you some sleep  medicine to lessen the pain of the injection.       The procedure takes between 5 and 15 minutes. It is usually done right before surgery, but will sometimes be after. It depends on your surgery and care needs.      What can I expect?   You may feel numbness, tingling or a heaviness in your abdomen.    You may have pain control up to 72 hours after surgery.   The TAP block may not lessen all of your surgery pain. But most patients feel 50 to 75 percent less pain than without the block.       Tell your nurse if you have:   Numbness or tingling in areas other than where the injection was   Blurry vision   Ringing in your ears   A metallic taste in your mouth

## 2022-09-08 ENCOUNTER — OFFICE VISIT (OUTPATIENT)
Dept: FAMILY MEDICINE | Facility: CLINIC | Age: 42
End: 2022-09-08
Payer: COMMERCIAL

## 2022-09-08 ENCOUNTER — ANESTHESIA EVENT (OUTPATIENT)
Dept: SURGERY | Facility: AMBULATORY SURGERY CENTER | Age: 42
End: 2022-09-08
Payer: COMMERCIAL

## 2022-09-08 VITALS
DIASTOLIC BLOOD PRESSURE: 71 MMHG | WEIGHT: 205 LBS | BODY MASS INDEX: 31.07 KG/M2 | SYSTOLIC BLOOD PRESSURE: 106 MMHG | HEART RATE: 53 BPM | HEIGHT: 68 IN | TEMPERATURE: 97.9 F | OXYGEN SATURATION: 99 % | RESPIRATION RATE: 16 BRPM

## 2022-09-08 DIAGNOSIS — Z01.818 PREOP GENERAL PHYSICAL EXAM: Primary | ICD-10-CM

## 2022-09-08 DIAGNOSIS — K80.81 BILIARY CALCULUS OF OTHER SITE WITH OBSTRUCTION: ICD-10-CM

## 2022-09-08 DIAGNOSIS — K29.70 GASTRITIS WITHOUT BLEEDING, UNSPECIFIED CHRONICITY, UNSPECIFIED GASTRITIS TYPE: ICD-10-CM

## 2022-09-08 LAB — SARS-COV-2 RNA RESP QL NAA+PROBE: NEGATIVE

## 2022-09-08 PROCEDURE — U0005 INFEC AGEN DETEC AMPLI PROBE: HCPCS | Performed by: PHYSICIAN ASSISTANT

## 2022-09-08 PROCEDURE — 99214 OFFICE O/P EST MOD 30 MIN: CPT | Performed by: PHYSICIAN ASSISTANT

## 2022-09-08 PROCEDURE — U0003 INFECTIOUS AGENT DETECTION BY NUCLEIC ACID (DNA OR RNA); SEVERE ACUTE RESPIRATORY SYNDROME CORONAVIRUS 2 (SARS-COV-2) (CORONAVIRUS DISEASE [COVID-19]), AMPLIFIED PROBE TECHNIQUE, MAKING USE OF HIGH THROUGHPUT TECHNOLOGIES AS DESCRIBED BY CMS-2020-01-R: HCPCS | Performed by: PHYSICIAN ASSISTANT

## 2022-09-08 RX ORDER — NALOXONE HYDROCHLORIDE 0.4 MG/ML
0.2 INJECTION, SOLUTION INTRAMUSCULAR; INTRAVENOUS; SUBCUTANEOUS
Status: DISCONTINUED | OUTPATIENT
Start: 2022-09-08 | End: 2022-09-10 | Stop reason: HOSPADM

## 2022-09-08 RX ORDER — NALOXONE HYDROCHLORIDE 0.4 MG/ML
0.4 INJECTION, SOLUTION INTRAMUSCULAR; INTRAVENOUS; SUBCUTANEOUS
Status: DISCONTINUED | OUTPATIENT
Start: 2022-09-08 | End: 2022-09-10 | Stop reason: HOSPADM

## 2022-09-08 RX ORDER — FENTANYL CITRATE 50 UG/ML
25 INJECTION, SOLUTION INTRAMUSCULAR; INTRAVENOUS
Status: CANCELLED | OUTPATIENT
Start: 2022-09-08

## 2022-09-08 ASSESSMENT — PATIENT HEALTH QUESTIONNAIRE - PHQ9
SUM OF ALL RESPONSES TO PHQ QUESTIONS 1-9: 0
SUM OF ALL RESPONSES TO PHQ QUESTIONS 1-9: 0
10. IF YOU CHECKED OFF ANY PROBLEMS, HOW DIFFICULT HAVE THESE PROBLEMS MADE IT FOR YOU TO DO YOUR WORK, TAKE CARE OF THINGS AT HOME, OR GET ALONG WITH OTHER PEOPLE: NOT DIFFICULT AT ALL

## 2022-09-08 NOTE — RESULT ENCOUNTER NOTE
Dear Rodolfo,      It was a pleasure to see you at your recent office visit.  Your test results are listed below.  Negative covid.       If you have any questions or concerns, please call the clinic at [phone].    Sincerely,  Nancy Jones PA-C

## 2022-09-08 NOTE — PROGRESS NOTES
[facility]  [address] [facility] [address]  Phone: [phone]  Primary Provider: Kennedy Loredo  Pre-op Performing Provider: LENNY KENDALL      PREOPERATIVE EVALUATION:  Today's date: 9/8/2022    Rodolfo Bennett is a 42 year old male who presents for a preoperative evaluation.    Surgical Information:  Surgery/Procedure: Gallstone removal surgery  Surgery Location: see [facility]  Surgeon: Dr. Morgan  Surgery Date: 09/09/2022  Time of Surgery: TBD  Where patient plans to recover: At home with family  Fax number for surgical facility: Note does not need to be faxed, will be available electronically in Epic.    Type of Anesthesia Anticipated: to be determined    Assessment & Plan     The proposed surgical procedure is considered INTERMEDIATE risk.    Preop general physical exam    - Asymptomatic COVID-19 Virus (Coronavirus) by PCR Nose    Biliary calculus of other site with obstruction      Gastritis without bleeding, unspecified chronicity, unspecified gastritis type             Risks and Recommendations:  The patient has the following additional risks and recommendations for perioperative complications:   - No identified additional risk factors other than previously addressed    Medication Instructions:  Patient is to take all scheduled medications on the day of surgery    RECOMMENDATION:  APPROVAL GIVEN to proceed with proposed procedure, without further diagnostic evaluation.    Subjective     HPI related to upcoming procedure:  Patient with h/o gastritis on PPI who was in er on 8-28 for symptomatic cholelithiasis. He had vomiting and electrolytes were corrected in er.   No fevers. No pain since he has been eating only bland food.       Preop Questions 9/8/2022   1. Have you ever had a heart attack or stroke? No   2. Have you ever had surgery on your heart or blood vessels, such as a stent placement, a coronary artery bypass, or surgery on an artery in your head, neck,  heart, or legs? No   3. Do you have chest pain with activity? No   4. Do you have a history of  heart failure? No   5. Do you currently have a cold, bronchitis or symptoms of other infection? No   6. Do you have a cough, shortness of breath, or wheezing? No   7. Do you or anyone in your family have previous history of blood clots? No   8. Do you or does anyone in your family have a serious bleeding problem such as prolonged bleeding following surgeries or cuts? No   9. Have you ever had problems with anemia or been told to take iron pills? No   10. Have you had any abnormal blood loss such as black, tarry or bloody stools? No   11. Have you ever had a blood transfusion? No   12. Are you willing to have a blood transfusion if it is medically needed before, during, or after your surgery? YES -asked patient to clarify   13. Have you or any of your relatives ever had problems with anesthesia? No   14. Do you have sleep apnea, excessive snoring or daytime drowsiness? No   15. Do you have any artifical heart valves or other implanted medical devices like a pacemaker, defibrillator, or continuous glucose monitor? No   16. Do you have artificial joints? No   17. Are you allergic to latex? No     Health Care Directive:  Patient does not have a Health Care Directive or Living Will: Discussed advance care planning with patient; however, patient declined at this time.    Preoperative Review of :   reviewed - no record of controlled substances prescribed.      Status of Chronic Conditions:  See problem list for active medical problems.  Problems all longstanding and stable, except as noted/documented.  See ROS for pertinent symptoms related to these conditions.      Review of Systems  Constitutional, neuro, ENT, endocrine, pulmonary, cardiac, gastrointestinal, genitourinary, musculoskeletal, integument and psychiatric systems are negative, except as otherwise noted.    Patient Active Problem List    Diagnosis Date Noted  "    Biliary calculus of other site with obstruction 08/31/2022     Priority: Medium     Gastritis without bleeding, unspecified chronicity, unspecified gastritis type 08/31/2022     Priority: Medium     Non morbid obesity due to excess calories 10/19/2015     Priority: Medium     Positive PPD 01/02/2013     Priority: Medium     Anxiety state 01/02/2013     Priority: Medium     Problem list name updated by automated process. Provider to review       Depressive disorder, not elsewhere classified 01/02/2013     Priority: Medium      Past Medical History:   Diagnosis Date     Allergic state      Anxiety      Past Surgical History:   Procedure Laterality Date     ESOPHAGOSCOPY, GASTROSCOPY, DUODENOSCOPY (EGD), COMBINED N/A 8/22/2022    Procedure: ESOPHAGOGASTRODUODENOSCOPY, WITH BIOPSY;  Surgeon: Anson Mercer MD;  Location:  GI     Current Outpatient Medications   Medication Sig Dispense Refill     pantoprazole (PROTONIX) 40 MG EC tablet Take 1 tablet (40 mg) by mouth daily 30 tablet 3     sucralfate (CARAFATE) 1 GM/10ML suspension Take 10 mLs (1 g) by mouth 4 times daily 414 mL 0     triamcinolone (KENALOG) 0.1 % external cream Apply topically 2 times daily as needed for irritation 80 g 3       Allergies   Allergen Reactions     Dust Mites         Social History     Tobacco Use     Smoking status: Never Smoker     Smokeless tobacco: Never Used   Substance Use Topics     Alcohol use: No     History   Drug Use No         Objective     /71   Pulse 53   Temp 97.9  F (36.6  C) (Tympanic)   Resp 16   Ht 1.727 m (5' 8\")   Wt 93 kg (205 lb)   SpO2 99%   BMI 31.17 kg/m      Physical Exam    GENERAL APPEARANCE: alert, active and over weight     EYES: EOMI,  PERRL     HENT: oral mucous membranes moist and oropharynx clear     NECK: no adenopathy, no asymmetry, masses, or scars and thyroid normal to palpation     RESP: lungs clear to auscultation - no rales, rhonchi or wheezes     CV: regular rates and " rhythm, normal S1 S2, no S3 or S4 and no murmur, click or rub     MS: extremities normal- no gross deformities noted, no evidence of inflammation in joints, FROM in all extremities.     SKIN: no suspicious lesions or rashes     NEURO: Normal strength and tone, sensory exam grossly normal, mentation intact and speech normal     PSYCH: mentation appears normal. and affect normal/bright     LYMPHATICS: No cervical adenopathy    Recent Labs   Lab Test 22  1010 22  0726 22  0545   HGB 15.1  --  15.2     --  272    135*  --    POTASSIUM 4.0 6.3*  --    CR 0.83 0.91  --         Diagnostics:  No labs were ordered during this visit.  Done recently see above  No ekg done, done recently see above.  Sinus ofelia.    Revised Cardiac Risk Index (RCRI):  The patient has the following serious cardiovascular risks for perioperative complications:   - No serious cardiac risks = 0 points     RCRI Interpretation: 0 points: Class I (very low risk - 0.4% complication rate)         Billin min spent on patient today including chart review, history, exam, and explaining treatment plan and follow-up.     Signed Electronically by: Nancy Jones PA-C  Copy of this evaluation report is provided to requesting physician.

## 2022-09-08 NOTE — PATIENT INSTRUCTIONS
Preparing for Your Surgery  Getting started  A nurse will call you to review your health history and instructions. They will give you an arrival time based on your scheduled surgery time. Please be ready to share:    Your doctor's clinic name and phone number    Your medical, surgical and anesthesia history    A list of allergies and sensitivities    A list of medicines, including herbal treatments and over-the-counter drugs    Whether the patient has a legal guardian (ask how to send us the papers in advance)  Please tell us if you're pregnant--or if there's any chance you might be pregnant. Some surgeries may injure a fetus (unborn baby), so they require a pregnancy test. Surgeries that are safe for a fetus don't always need a test, and you can choose whether to have one.   If you have a child who's having surgery, please ask for a copy of Preparing for Your Child's Surgery.    Preparing for surgery    Within 30 days of surgery: Have a pre-op exam (sometimes called an H&P, or History and Physical). This can be done at a clinic or pre-operative center.  ? If you're having a , you may not need this exam. Talk to your care team.    At your pre-op exam, talk to your care team about all medicines you take. If you need to stop any medicines before surgery, ask when to start taking them again.  ? We do this for your safety. Many medicines can make you bleed too much during surgery. Some change how well surgery (anesthesia) drugs work.    Call your insurance company to let them know you're having surgery. (If you don't have insurance, call [phone].)    Call your clinic if there's any change in your health. This includes signs of a cold or flu (sore throat, runny nose, cough, rash, fever). It also includes a scrape or scratch near the surgery site.    If you have questions on the day of surgery, call your hospital or surgery center.  COVID testing  You may need to be tested for COVID-19 before having  surgery. If so, we will give you instructions.  Eating and drinking guidelines  For your safety: Unless your surgeon tells you otherwise, follow the guidelines below.    Eat and drink as usual until 8 hours before surgery. After that, no food or milk.    Drink clear liquids until 2 hours before surgery. These are liquids you can see through, like water, Gatorade and Propel Water. You may also have black coffee and tea (no cream or milk).    Nothing by mouth within 2 hours of surgery. This includes gum, candy and breath mints.    If you drink alcohol: Stop drinking it the night before surgery.    If your care team tells you to take medicine on the morning of surgery, it's okay to take it with a sip of water.  Preventing infection    Shower or bathe the night before and morning of your surgery. Follow the instructions your clinic gave you. (If no instructions, use regular soap.)    Don't shave or clip hair near your surgery site. We'll remove the hair if needed.    Don't smoke or vape the morning of surgery. You may chew nicotine gum up to 2 hours before surgery. A nicotine patch is okay.  ? Note: Some surgeries require you to completely quit smoking and nicotine. Check with your surgeon.    Your care team will make every effort to keep you safe from infection. We will:  ? Clean our hands often with soap and water (or an alcohol-based hand rub).  ? Clean the skin at your surgery site with a special soap that kills germs.  ? Give you a special gown to keep you warm. (Cold raises the risk of infection.)  ? Wear special hair covers, masks, gowns and gloves during surgery.  ? Give antibiotic medicine, if prescribed. Not all surgeries need antibiotics.  What to bring on the day of surgery    Photo ID and insurance card    Copy of your health care directive, if you have one    Glasses and hearing aides (bring cases)  ? You can't wear contacts during surgery    Inhaler and eye drops, if you use them (tell us about these when  you arrive)    CPAP machine or breathing device, if you use them    A few personal items, if spending the night    If you have . . .  ? A pacemaker, ICD (cardiac defibrillator) or other implant: Bring the ID card.  ? An implanted stimulator: Bring the remote control.  ? A legal guardian: Bring a copy of the certified (court-stamped) guardianship papers.  Please remove any jewelry, including body piercings. Leave jewelry and other valuables at home.  If you're going home the day of surgery    You must have a responsible adult drive you home. They should stay with you overnight as well.    If you don't have someone to stay with you, and you aren't safe to go home alone, we may keep you overnight. Insurance often won't pay for this.  After surgery  If it's hard to control your pain or you need more pain medicine, please call your surgeon's office.  Questions?   If you have any questions for your care team, list them here: _________________________________________________________________________________________________________________________________________________________________________ ____________________________________ ____________________________________ ____________________________________  For informational purposes only. Not to replace the advice of your health care provider. Copyright   2003, 2019 [facility]. All rights reserved. Clinically reviewed by Carolyn Schroeder MD. [employer] [id number] - REV 07/21.

## 2022-09-08 NOTE — LETTER
September 8, 2022      Rodolfo Bennett  [address] [facility] [address]            Dear Rodolfo,       It was a pleasure to see you at your recent office visit.  Your test results are listed below.  Negative covid.       If you have any questions or concerns, please call the clinic at [phone].     Sincerely,   Nancy Jones PA-C       Resulted Orders   Asymptomatic COVID-19 Virus (Coronavirus) by PCR Nose   Result Value Ref Range    SARS CoV2 PCR Negative Negative      Comment:      NEGATIVE: SARS-CoV-2 (COVID-19) RNA not detected, presumed negative.    Narrative    Testing was performed using the Xpert Xpress SARS-CoV-2 Assay on the  Cepheid Gene-Xpert Instrument Systems. Additional information about  this Emergency Use Authorization (EUA) assay can be found via the Lab  Guide. This test should be ordered for the detection of SARS-CoV-2 in  individuals who meet SARS-CoV-2 clinical and/or epidemiological  criteria. Test performance is unknown in asymptomatic patients. This  test is for in vitro diagnostic use under the FDA EUA for  laboratories certified under CLIA to perform high complexity testing.  This test has not been FDA cleared or approved. A negative result  does not rule out the presence of PCR inhibitors in the specimen or  target RNA in concentration below the limit of detection for the  assay. The possibility of a false negative should be considered if  the patient's recent exposure or clinical presentation suggests  COVID-19. This test was validated by the [facility]. This laboratory is certified under  the Clinical Laboratory Improvement Amendments of 1988 (CLIA-88) as  qualified to perform high complexity laboratory testing.

## 2022-09-08 NOTE — TELEPHONE ENCOUNTER
FUTURE VISIT INFORMATION      SURGERY INFORMATION:    Date: 22    [facility]: [facility]    Surgeon:  Stephon Morgan MD    Anesthesia Type:  general    Procedure: CHOLECYSTECTOMY, ROBOT-ASSISTED, LAPAROSCOPIC, WITHOUT CHOLANGIOGRAM    Consult: ov 22    RECORDS REQUESTED FROM:       Primary Care Provider: Josep    Most recent EKG+ Tracin22

## 2022-09-09 ENCOUNTER — HOSPITAL ENCOUNTER (OUTPATIENT)
Facility: AMBULATORY SURGERY CENTER | Age: 42
Discharge: HOME | End: 2022-09-09
Attending: SURGERY
Payer: COMMERCIAL

## 2022-09-09 ENCOUNTER — ANESTHESIA (OUTPATIENT)
Dept: SURGERY | Facility: AMBULATORY SURGERY CENTER | Age: 42
End: 2022-09-09
Payer: COMMERCIAL

## 2022-09-09 VITALS
DIASTOLIC BLOOD PRESSURE: 53 MMHG | HEART RATE: 59 BPM | RESPIRATION RATE: 15 BRPM | WEIGHT: 207 LBS | HEIGHT: 68 IN | SYSTOLIC BLOOD PRESSURE: 105 MMHG | OXYGEN SATURATION: 95 % | TEMPERATURE: 98.5 F | BODY MASS INDEX: 31.37 KG/M2

## 2022-09-09 DIAGNOSIS — K80.20 SYMPTOMATIC CHOLELITHIASIS: ICD-10-CM

## 2022-09-09 PROCEDURE — 88304 TISSUE EXAM BY PATHOLOGIST: CPT | Mod: 26 | Performed by: PATHOLOGY

## 2022-09-09 PROCEDURE — 88304 TISSUE EXAM BY PATHOLOGIST: CPT | Mod: TC | Performed by: SURGERY

## 2022-09-09 PROCEDURE — 47562 LAPAROSCOPIC CHOLECYSTECTOMY: CPT | Performed by: SURGERY

## 2022-09-09 PROCEDURE — 47562 LAPAROSCOPIC CHOLECYSTECTOMY: CPT

## 2022-09-09 RX ORDER — SODIUM CHLORIDE, SODIUM LACTATE, POTASSIUM CHLORIDE, CALCIUM CHLORIDE 600; 310; 30; 20 MG/100ML; MG/100ML; MG/100ML; MG/100ML
INJECTION, SOLUTION INTRAVENOUS CONTINUOUS
Status: DISCONTINUED | OUTPATIENT
Start: 2022-09-09 | End: 2022-09-10 | Stop reason: HOSPADM

## 2022-09-09 RX ORDER — FLUMAZENIL 0.1 MG/ML
0.2 INJECTION, SOLUTION INTRAVENOUS
Status: DISCONTINUED | OUTPATIENT
Start: 2022-09-09 | End: 2022-09-10 | Stop reason: HOSPADM

## 2022-09-09 RX ORDER — ONDANSETRON 2 MG/ML
INJECTION INTRAMUSCULAR; INTRAVENOUS PRN
Status: DISCONTINUED | OUTPATIENT
Start: 2022-09-09 | End: 2022-09-09

## 2022-09-09 RX ORDER — FENTANYL CITRATE 50 UG/ML
25-50 INJECTION, SOLUTION INTRAMUSCULAR; INTRAVENOUS
Status: DISCONTINUED | OUTPATIENT
Start: 2022-09-09 | End: 2022-09-10 | Stop reason: HOSPADM

## 2022-09-09 RX ORDER — FENTANYL CITRATE 50 UG/ML
INJECTION, SOLUTION INTRAMUSCULAR; INTRAVENOUS PRN
Status: DISCONTINUED | OUTPATIENT
Start: 2022-09-09 | End: 2022-09-09

## 2022-09-09 RX ORDER — BUPIVACAINE HYDROCHLORIDE 2.5 MG/ML
INJECTION, SOLUTION EPIDURAL; INFILTRATION; INTRACAUDAL
Status: COMPLETED | OUTPATIENT
Start: 2022-09-09 | End: 2022-09-09

## 2022-09-09 RX ORDER — CEFAZOLIN SODIUM 2 G/50ML
2 SOLUTION INTRAVENOUS
Status: COMPLETED | OUTPATIENT
Start: 2022-09-09 | End: 2022-09-09

## 2022-09-09 RX ORDER — ONDANSETRON 2 MG/ML
4 INJECTION INTRAMUSCULAR; INTRAVENOUS EVERY 30 MIN PRN
Status: DISCONTINUED | OUTPATIENT
Start: 2022-09-09 | End: 2022-09-10 | Stop reason: HOSPADM

## 2022-09-09 RX ORDER — ONDANSETRON 4 MG/1
4 TABLET, ORALLY DISINTEGRATING ORAL EVERY 30 MIN PRN
Status: DISCONTINUED | OUTPATIENT
Start: 2022-09-09 | End: 2022-09-10 | Stop reason: HOSPADM

## 2022-09-09 RX ORDER — BUPIVACAINE HYDROCHLORIDE AND EPINEPHRINE 2.5; 5 MG/ML; UG/ML
INJECTION, SOLUTION INFILTRATION; PERINEURAL PRN
Status: DISCONTINUED | OUTPATIENT
Start: 2022-09-09 | End: 2022-09-09 | Stop reason: HOSPADM

## 2022-09-09 RX ORDER — FENTANYL CITRATE 50 UG/ML
25 INJECTION, SOLUTION INTRAMUSCULAR; INTRAVENOUS EVERY 5 MIN PRN
Status: DISCONTINUED | OUTPATIENT
Start: 2022-09-09 | End: 2022-09-10 | Stop reason: HOSPADM

## 2022-09-09 RX ORDER — DEXAMETHASONE SODIUM PHOSPHATE 4 MG/ML
INJECTION, SOLUTION INTRA-ARTICULAR; INTRALESIONAL; INTRAMUSCULAR; INTRAVENOUS; SOFT TISSUE PRN
Status: DISCONTINUED | OUTPATIENT
Start: 2022-09-09 | End: 2022-09-09

## 2022-09-09 RX ORDER — OXYCODONE HYDROCHLORIDE 5 MG/1
5 TABLET ORAL EVERY 4 HOURS PRN
Status: DISCONTINUED | OUTPATIENT
Start: 2022-09-09 | End: 2022-09-10 | Stop reason: HOSPADM

## 2022-09-09 RX ORDER — LIDOCAINE HYDROCHLORIDE 20 MG/ML
INJECTION, SOLUTION INFILTRATION; PERINEURAL PRN
Status: DISCONTINUED | OUTPATIENT
Start: 2022-09-09 | End: 2022-09-09

## 2022-09-09 RX ORDER — LIDOCAINE 40 MG/G
CREAM TOPICAL
Status: DISCONTINUED | OUTPATIENT
Start: 2022-09-09 | End: 2022-09-10 | Stop reason: HOSPADM

## 2022-09-09 RX ORDER — CEFAZOLIN SODIUM 2 G/50ML
2 SOLUTION INTRAVENOUS SEE ADMIN INSTRUCTIONS
Status: DISCONTINUED | OUTPATIENT
Start: 2022-09-09 | End: 2022-09-10 | Stop reason: HOSPADM

## 2022-09-09 RX ORDER — PROPOFOL 10 MG/ML
INJECTION, EMULSION INTRAVENOUS CONTINUOUS PRN
Status: DISCONTINUED | OUTPATIENT
Start: 2022-09-09 | End: 2022-09-09

## 2022-09-09 RX ORDER — HYDROMORPHONE HYDROCHLORIDE 1 MG/ML
0.2 INJECTION, SOLUTION INTRAMUSCULAR; INTRAVENOUS; SUBCUTANEOUS EVERY 5 MIN PRN
Status: DISCONTINUED | OUTPATIENT
Start: 2022-09-09 | End: 2022-09-10 | Stop reason: HOSPADM

## 2022-09-09 RX ORDER — INDOCYANINE GREEN AND WATER 25 MG
2.5 KIT INJECTION ONCE
Status: COMPLETED | OUTPATIENT
Start: 2022-09-09 | End: 2022-09-09

## 2022-09-09 RX ORDER — OXYCODONE HYDROCHLORIDE 5 MG/1
5 TABLET ORAL EVERY 6 HOURS PRN
Qty: 12 TABLET | Refills: 0 | Status: SHIPPED | OUTPATIENT
Start: 2022-09-09 | End: 2022-09-12

## 2022-09-09 RX ORDER — PROPOFOL 10 MG/ML
INJECTION, EMULSION INTRAVENOUS PRN
Status: DISCONTINUED | OUTPATIENT
Start: 2022-09-09 | End: 2022-09-09

## 2022-09-09 RX ORDER — ACETAMINOPHEN 325 MG/1
975 TABLET ORAL ONCE
Status: COMPLETED | OUTPATIENT
Start: 2022-09-09 | End: 2022-09-09

## 2022-09-09 RX ORDER — KETAMINE HYDROCHLORIDE 10 MG/ML
INJECTION INTRAMUSCULAR; INTRAVENOUS PRN
Status: DISCONTINUED | OUTPATIENT
Start: 2022-09-09 | End: 2022-09-09

## 2022-09-09 RX ORDER — GLYCOPYRROLATE 0.2 MG/ML
INJECTION, SOLUTION INTRAMUSCULAR; INTRAVENOUS PRN
Status: DISCONTINUED | OUTPATIENT
Start: 2022-09-09 | End: 2022-09-09

## 2022-09-09 RX ORDER — KETOROLAC TROMETHAMINE 30 MG/ML
INJECTION, SOLUTION INTRAMUSCULAR; INTRAVENOUS PRN
Status: DISCONTINUED | OUTPATIENT
Start: 2022-09-09 | End: 2022-09-09

## 2022-09-09 RX ADMIN — BUPIVACAINE HYDROCHLORIDE 20 ML: 2.5 INJECTION, SOLUTION EPIDURAL; INFILTRATION; INTRACAUDAL at 11:46

## 2022-09-09 RX ADMIN — DEXAMETHASONE SODIUM PHOSPHATE 4 MG: 4 INJECTION, SOLUTION INTRA-ARTICULAR; INTRALESIONAL; INTRAMUSCULAR; INTRAVENOUS; SOFT TISSUE at 12:24

## 2022-09-09 RX ADMIN — OXYCODONE HYDROCHLORIDE 5 MG: 5 TABLET ORAL at 14:56

## 2022-09-09 RX ADMIN — PROPOFOL 200 MG: 10 INJECTION, EMULSION INTRAVENOUS at 12:30

## 2022-09-09 RX ADMIN — PROPOFOL 200 MCG/KG/MIN: 10 INJECTION, EMULSION INTRAVENOUS at 12:32

## 2022-09-09 RX ADMIN — ONDANSETRON 4 MG: 2 INJECTION INTRAMUSCULAR; INTRAVENOUS at 12:24

## 2022-09-09 RX ADMIN — GLYCOPYRROLATE 0.2 MG: 0.2 INJECTION, SOLUTION INTRAMUSCULAR; INTRAVENOUS at 12:24

## 2022-09-09 RX ADMIN — ACETAMINOPHEN 975 MG: 325 TABLET ORAL at 11:20

## 2022-09-09 RX ADMIN — CEFAZOLIN SODIUM 2 G: 2 SOLUTION INTRAVENOUS at 12:22

## 2022-09-09 RX ADMIN — FENTANYL CITRATE 50 MCG: 50 INJECTION, SOLUTION INTRAMUSCULAR; INTRAVENOUS at 12:40

## 2022-09-09 RX ADMIN — FENTANYL CITRATE 50 MCG: 50 INJECTION, SOLUTION INTRAMUSCULAR; INTRAVENOUS at 11:50

## 2022-09-09 RX ADMIN — Medication 0.5 MG: at 14:03

## 2022-09-09 RX ADMIN — Medication 20 MG: at 13:18

## 2022-09-09 RX ADMIN — KETAMINE HYDROCHLORIDE 30 MG: 10 INJECTION INTRAMUSCULAR; INTRAVENOUS at 13:01

## 2022-09-09 RX ADMIN — LIDOCAINE HYDROCHLORIDE 100 MG: 20 INJECTION, SOLUTION INFILTRATION; PERINEURAL at 12:30

## 2022-09-09 RX ADMIN — PROPOFOL 60 MG: 10 INJECTION, EMULSION INTRAVENOUS at 14:13

## 2022-09-09 RX ADMIN — FENTANYL CITRATE 50 MCG: 50 INJECTION, SOLUTION INTRAMUSCULAR; INTRAVENOUS at 12:42

## 2022-09-09 RX ADMIN — KETOROLAC TROMETHAMINE 30 MG: 30 INJECTION, SOLUTION INTRAMUSCULAR; INTRAVENOUS at 13:59

## 2022-09-09 RX ADMIN — Medication 50 MG: at 12:31

## 2022-09-09 RX ADMIN — SODIUM CHLORIDE, SODIUM LACTATE, POTASSIUM CHLORIDE, CALCIUM CHLORIDE: 600; 310; 30; 20 INJECTION, SOLUTION INTRAVENOUS at 12:22

## 2022-09-09 RX ADMIN — FENTANYL CITRATE 50 MCG: 50 INJECTION, SOLUTION INTRAMUSCULAR; INTRAVENOUS at 11:46

## 2022-09-09 RX ADMIN — INDOCYANINE GREEN AND WATER 2.5 MG: KIT at 11:29

## 2022-09-09 ASSESSMENT — COPD QUESTIONNAIRES: COPD: 0

## 2022-09-09 ASSESSMENT — LIFESTYLE VARIABLES: TOBACCO_USE: 0

## 2022-09-09 ASSESSMENT — ENCOUNTER SYMPTOMS: ORTHOPNEA: 0

## 2022-09-09 NOTE — OP NOTE
[facility] Operative Note    Pre-operative diagnosis: Symptomatic cholelithiasis [K80.20]   Post-operative diagnosis Acute cholecystitis   Procedure: Procedure(s):  CHOLECYSTECTOMY, ROBOT-ASSISTED, LAPAROSCOPIC, WITHOUT CHOLANGIOGRAM   Surgeon: Stephon Morgan MD   Assistants(s): none   Estimated blood loss: 20ml    Specimens: gallbladder   Findings: Acute adhesions to the gallbladder.  Fatty, vascular tissue.  Surgicel left in gallbladder fossa.  Cystic artery medialized.         Rodolfo Bennett was brought to the operating room and placed supine on the operating table with the bed flexed.  They had received indocyanine in the preoperative area.  ABX were given.  They were sedated and intubated without issue, an OG tube was placed.  They had received a TAP block and additional local anesthetic was used along the incisions.  The abdomen was prepped and draped in sterile fashion and a time out was performed.    An infraumbilical incision was made sharply and a shayna robotic port was placed in an open fashion.  The abdomen was insufflated and the laparoscope inserted.  A 5mm assistant port was placed in the LUQ and 2 additional robotic ports placed- in the right and left abdomen.  The robot was then docked with the patient.    The gallbladder was grasped via the assistant port and retracted cephalad.  The gallbladder appeared acutely inflamed and distended.  There were acute adhesions along the gallbladder from the omentum.  The infundibulum was grasped and retracted laterally.  Using firefly we were able to identify the cystic duct and common bile duct.  Using hook cautery the peritoneal lining around the infundibulum was dissected off of the gallbladder.  The cystic duct and artery were isolated and the gallbladder was dissected of the cystic plate.  After identifying our critical view of safety with only these two tubular structures entering the gallbladder we clipped the cystic duct with hemolock clips.   The cystic artery was medialized off of the gallbladder, and a distal portion was cauterized with bipolar energy.  The gallbladder was then taken off of the liver with cautery and removed from the abdomen.  The clips were inspected and found to be intact without spillage of blood or bile.  Spillage of bile had not occurred.  Blood and clot was present and this was suctioned out.  I inspected the gallbladder bed and no further bleeding was seen.  I placed surgicel in the field due to the acute inflammation and left it in the gallbladder fossa.    The ports were then removed and the abdomen allowed to collapse.  The umbilical fascia was closed with interrupted 0 vicryl suture.  Skin was closed with 4.0 monocryl and steri strips applied.  Rodolfo Bennett was then woken from anesthesia, extubated and brought to recovery.    I performed the procedure.

## 2022-09-09 NOTE — ANESTHESIA CARE TRANSFER NOTE
Patient: Rodolfo Bennett    Procedure: Procedure(s):  CHOLECYSTECTOMY, ROBOT-ASSISTED, LAPAROSCOPIC, WITHOUT CHOLANGIOGRAM       Diagnosis: Symptomatic cholelithiasis [K80.20]  Diagnosis Additional Information: No value filed.    Anesthesia Type:   General     Note:    Oropharynx: spontaneously breathing and oral airway in place  Level of Consciousness: drowsy  Oxygen Supplementation: face mask  Level of Supplemental Oxygen (L/min / FiO2): 6  Independent Airway: airway patency satisfactory and stable  Dentition: dentition unchanged  Vital Signs Stable: post-procedure vital signs reviewed and stable  Report to RN Given: handoff report given  Patient transferred to: PACU    Handoff Report: Identifed the Patient, Identified the Reponsible Provider, Reviewed the pertinent medical history, Discussed the surgical course, Reviewed Intra-OP anesthesia mangement and issues during anesthesia, Set expectations for post-procedure period and Allowed opportunity for questions and acknowledgement of understanding      Vitals:  Vitals Value Taken Time   BP     Temp     Pulse     Resp     SpO2         Electronically Signed By: DOMINGUEZ Ortiz CRNA  September 9, 2022  2:16 PM

## 2022-09-09 NOTE — ANESTHESIA PREPROCEDURE EVALUATION
Anesthesia Pre-Procedure Evaluation    Patient: Rodolfo Bennett   MRN: [id number] : 1980        Procedure : Procedure(s):  CHOLECYSTECTOMY, ROBOT-ASSISTED, LAPAROSCOPIC, WITHOUT CHOLANGIOGRAM          Past Medical History:   Diagnosis Date     Allergic state      Anxiety      Positive PPD 2013      Past Surgical History:   Procedure Laterality Date     ESOPHAGOSCOPY, GASTROSCOPY, DUODENOSCOPY (EGD), COMBINED N/A 2022    Procedure: ESOPHAGOGASTRODUODENOSCOPY, WITH BIOPSY;  Surgeon: Anson Mercer MD;  Location:  GI      Allergies   Allergen Reactions     Dust Mites       Social History     Tobacco Use     Smoking status: Never Smoker     Smokeless tobacco: Never Used   Substance Use Topics     Alcohol use: No      Wt Readings from Last 1 Encounters:   22 93.9 kg (207 lb)        Anesthesia Evaluation   Pt has not had prior anesthetic         ROS/MED HX  ENT/Pulmonary:    (-) tobacco use, asthma, COPD and recent URI   Neurologic:       Cardiovascular:    (-) ALAN, orthopnea/PND and syncope   METS/Exercise Tolerance: >4 METS    Hematologic:       Musculoskeletal:       GI/Hepatic: Comment: Heartburn & nausea after eating. No symptoms currently as he has been appropriately NPO.      Renal/Genitourinary:       Endo:       Psychiatric/Substance Use:       Infectious Disease:       Malignancy:       Other:            Physical Exam    Airway        Mallampati: I   TM distance: > 3 FB   Neck ROM: full   Mouth opening: > 3 cm    Respiratory Devices and Support         Dental       (+) upper braces and lower braces      Cardiovascular          Rhythm and rate: regular and normal     Pulmonary           breath sounds clear to auscultation           OUTSIDE LABS:  CBC:   Lab Results   Component Value Date    WBC 7.2 2022    WBC 7.4 2022    HGB 15.1 2022    HGB 15.2 2022    HCT 45.0 2022    HCT 43.6 2022     2022     2022     BMP:   Lab  Results   Component Value Date     08/28/2022     (L) 08/28/2022    POTASSIUM 4.0 08/28/2022    POTASSIUM 6.3 (HH) 08/28/2022    CHLORIDE 104 08/28/2022    CHLORIDE 100 08/28/2022    CO2 20 (L) 08/28/2022    CO2 25 08/28/2022    BUN 10.9 08/28/2022    BUN 11.0 08/28/2022    CR 0.83 08/28/2022    CR 0.91 08/28/2022     (H) 08/28/2022     (H) 08/28/2022     COAGS: No results found for: PTT, INR, FIBR  POC: No results found for: BGM, HCG, HCGS  HEPATIC:   Lab Results   Component Value Date    ALBUMIN 4.0 08/28/2022    PROTTOTAL 6.7 08/28/2022    ALT 35 08/28/2022    AST 25 08/28/2022    ALKPHOS 71 08/28/2022    BILITOTAL 0.2 08/28/2022     OTHER:   Lab Results   Component Value Date    LACT 2.0 08/28/2022    YOUNG 8.6 08/28/2022    MAG 1.9 08/28/2022    LIPASE 66 (H) 08/28/2022    TSH 0.41 10/09/2015       Anesthesia Plan    ASA Status:  1   NPO Status:  NPO Appropriate    Anesthesia Type: General.     - Airway: ETT   Induction: Intravenous.   Maintenance: Balanced.        Consents    Anesthesia Plan(s) and associated risks, benefits, and realistic alternatives discussed. Questions answered and patient/representative(s) expressed understanding.    - Discussed:     - Discussed with:  Patient         Postoperative Care    Pain management: IV analgesics, Oral pain medications, Peripheral nerve block (Single Shot).   PONV prophylaxis: Ondansetron (or other 5HT-3), Dexamethasone or Solumedrol, Background Propofol Infusion     Comments:    Other Comments: Discussed risks of general anesthesia, including aspiration pneumonia, sore throat/hoarse voice, abrasions/damage to lips/tongue/teeth, nausea, rare complications (including medication reactions, cardiac, pulmonary, hypoxia/low oxygen, recall). Ensured understanding, invited questions and all questions were answered. Patient wishes to proceed.    Handoff to Dr. Pineda while patient in preop. Relayed GI symptoms that have been consistently  postprandial, with no symptoms currently as patient has been appropriately npo.     Preoperative transversus abdominis plane block with liposomal bupivacaine per surgeon request. Discussed risks of nerve block, including nerve injury, bleeding, infection, incomplete analgesia. Discussed alternative of not performing a nerve block. Ensured understanding, invited questions and all questions were answered. Patient wishes to proceed.       H&P reviewed: Unable to attach H&P to encounter due to EHR limitations. H&P Update: appropriate H&P reviewed, patient examined. No interval changes since H&P (within 30 days).         Giana Wolfe MD

## 2022-09-09 NOTE — OR NURSING
Patient received bilateral Transverse Abdominis Plane nerve block  with Exparel.  Fentanyl 100mcg and Versed 1mg given. Tolerated procedure well.

## 2022-09-09 NOTE — ANESTHESIA PROCEDURE NOTES
TAP Procedure Note    Pre-Procedure   Staff -        Anesthesiologist:  Giana Wolfe MD       Performed By: anesthesiologist       Location: pre-op       Procedure Start/Stop Times: 9/9/2022 11:46 AM and 9/9/2022 11:58 AM       Pre-Anesthestic Checklist: patient identified, IV checked, site marked, risks and benefits discussed, informed consent, monitors and equipment checked, pre-op evaluation, at physician/surgeon's request and post-op pain management  Timeout:       Correct Patient: Yes        Correct Procedure: Yes        Correct Site: Yes        Correct Position: Yes        Correct Laterality: Yes        Site Marked: Yes  Procedure Documentation  Procedure: TAP       Diagnosis: POST OPERATIVE PAIN       Laterality: bilateral       Patient Position: supine       Patient Prep/Sterile Barriers: sterile gloves, mask       Skin prep: Chloraprep       Needle Type: short bevel       Needle Gauge: 21.        Needle Length (millimeters): 110        Ultrasound guided       1. Ultrasound was used to identify targeted nerve, plexus, vascular marker, or fascial plane and place a needle adjacent to it in real-time.       2. Ultrasound was used to visualize the spread of anesthetic in close proximity to the above referenced structure.       3. A permanent image is entered into the patient's record.    Assessment/Narrative         The placement was negative for: blood aspirated, painful injection and site bleeding       Paresthesias: No.       Bolus given via needle..        Secured via.        Insertion/Infusion Method: Single Shot       Complications: none       Injection made incrementally with aspirations every 5 mL.    Medication(s) Administered   Bupivacaine 0.25% PF (Infiltration) - Infiltration   20 mL - 9/9/2022 11:46:00 AM  Bupivacaine liposome (Exparel) 1.3% LA inj susp (Infiltration) - Infiltration   20 mL - 9/9/2022 11:46:00 AM  Medication Administration Time: 9/9/2022 11:46 AM     Comments:  266mg of liposomal  bupivacaine via bilateral transversus abdominis plane block.     Discussed risks of nerve block, including nerve injury, bleeding, infection, incomplete analgesia. Discussed alternative of not performing a nerve block. Ensured understanding, invited questions and all questions were answered. Patient wishes to proceed. Informed consent was obtained.     Patient tolerated well with additional prn fentanyl. I checked in with him throughout the procedure, to ensure he wished to continue. Incremental aspiration every 5 mL. No paresthesia, no heme. Needle tip visualized throughout with appropriate spread of local anesthetic in fascial plane.   Block was placed at the surgeon's request for post operative pain control.

## 2022-09-09 NOTE — ANESTHESIA POSTPROCEDURE EVALUATION
Patient: Rodolfo Bennett    Procedure: Procedure(s):  CHOLECYSTECTOMY, ROBOT-ASSISTED, LAPAROSCOPIC, WITHOUT CHOLANGIOGRAM       Anesthesia Type:  General    Note:  Disposition: Outpatient   Postop Pain Control: Uneventful            Sign Out: Well controlled pain   PONV: No   Neuro/Psych: Uneventful            Sign Out: Acceptable/Baseline neuro status   Airway/Respiratory: Uneventful            Sign Out: Acceptable/Baseline resp. status   CV/Hemodynamics: Uneventful            Sign Out: Acceptable CV status; No obvious hypovolemia; No obvious fluid overload   Other NRE: NONE   DID A NON-ROUTINE EVENT OCCUR? No           Last vitals:  Vitals Value Taken Time   /71 09/09/22 1430   Temp 36.2  C (97.2  F) 09/09/22 1430   Pulse 75 09/09/22 1432   Resp 17 09/09/22 1432   SpO2 100 % 09/09/22 1432   Vitals shown include unvalidated device data.    Electronically Signed By: Tim Pineda MD, MD  September 9, 2022  2:34 PM

## 2022-09-09 NOTE — DISCHARGE INSTRUCTIONS
Discharge Instructions for Laparoscopic Cholecystectomy   You have had a procedure called a laparoscopic cholecystectomy. This is a surgery to remove your gallbladder. People who have this procedure often recover more quickly and have less pain than with open gallbladder surgery (called open cholecystectomy). Many surgeons advise a low-fat diet, staying away from fried food in particular, for the first month after surgery.    You can live a full and healthy life without your gallbladder. This includes eating the foods and doing the things you enjoyed before your gallbladder problems started.   Home care  Recommendations for home care include the following:    Ask someone to drive you to your appointments for the next 3 days. Don t drive until you are no longer taking pain medicine and can step on the brake pedal without hesitation.   Wash the skin around your cut (incision) daily with mild soap and water. It's OK to shower the day after your surgery.  Eat your regular diet. Try to stay away from rich, greasy, or spicy food for a few days.  Remember, it takes at least 1 week for you to get most of your strength and energy back.  If you are constipated, talk about a bowel regimen with your provider. Pain medicines can be constipating. Increased fiber and a stool softener are often helpful.  Make an office visit to talk with your healthcare provider if these symptoms don t go away in a week after your surgery:  Extreme tiredness (fatigue)  Pain around the incision  Diarrhea or constipation  Loss of appetite  When to call your healthcare provider  Call your healthcare provider right away if you have any of the following:   Yellowing of your eyes or skin (jaundice)  Chills  Fever of 100.4 F (38.0 C) or higher, or as directed by your provider   Redness, swelling, increasing pain, pus, or a bad smell at the incision site  Dark or rust-colored urine  Stool that is devon-colored or light in color instead of  "brown  Increasing belly pain  Rectal bleeding  Trouble breathing or shortness of breath  Leg swelling  [employer] last reviewed this educational content on 6/1/2019 2000-2021 [employer]. All rights reserved. This information is not intended as a substitute for professional medical care. Always follow your healthcare professional's instructions.    [facility]  Home Care Following Anesthesia  For 24 hours after surgery:  Get plenty of rest.  A responsible adult must stay with you for at least 24 hours after you leave the surgery center.  Do not drive or use heavy equipment.  If you have weakness or tingling, don't drive or use heavy equipment until this feeling goes away.   Do not drink alcohol.   Avoid strenuous or risky activities.  Ask for help when climbing stairs.  You may feel lightheaded.  IF so, sit for a few minutes before standing.  Have someone help you get up.   If you have nausea (feel sick to your stomach): Drink only clear liquids such as apple juice, ginger ale, broth or 7-Up.  Rest may also help.  Be sure to drink enough fluids.  Move to a regular diet as you feel able.   You may have a slight fever.  Call the doctor if your fever is over 100 F (37.7 C) (taken under the tongue) or lasts longer than 24 hours.  You may have a dry mouth, a sore throat, muscle aches or trouble sleeping. These should go away after 24 hours.  Do not make important or legal decisions.   It is recommended to avoid smoking.        Today you received an Exparel block to numb the nerves near your surgery site.  This is a block using local anesthetic or \"numbing\" medication injected around the nerves to anesthetize or \"numb\" the area supplied by those nerves.  This block is injected into the muscle layer near your surgical site.  This medication may numb the location where you had surgery up to 72 hours.  If your surgical site is an arm or leg you should be careful with your " affected limb, since it is possible to injure your limb without being aware of it due to the numbing.  Until full feeling returns, you should guard against bumping or hitting your limb, and avoid extreme hot or cold temperatures on the skin.  As the block wears off, the feeling will return as a tingling or prickly sensation near your surgical site.  You will experince more discomfort from your incision as the feeling returns.  You may want to take a pain pill (a narcotic or Tylenol if this was prescribed by your surgeon) when you start to experience mild pain before the pain beomes more severe.  If your pain medications do not control your pain, you should notify your surgeon.    Tips for taking pain medications  To get the best pain relief possible, remember these points:  Take pain medications as directed, before pain becomes severe.  Pain medication can upset your stomach: taking it with food may help.  Constipation is a common side effect of pain medication. Drink plenty of  fluids.  Eat foods high in fiber. Take a stool softener if recommended by your doctor or pharmacist.  Do not drink alcohol, drive or operate machinery while taking pain medications.  Ask about other ways to control pain, such as with heat, ice or relaxation.    Tylenol/Acetaminophen Consumption  To help encourage the safe use of acetaminophen, the makers of TYLENOL  have lowered the maximum daily dose for single-ingredient Extra Strength TYLENOL  (acetaminophen) products sold in the [place] from 8 pills per day (4,000 mg) to 6 pills per day (3,000 mg). The dosing interval has also changed from 2 pills every 4-6 hours to 2 pills every 6 hours.  If you feel your pain relief is insufficient, you may take Tylenol/Acetaminophen in addition to your narcotic pain medication.   Be careful not to exceed 3,000 mg of Tylenol/Acetaminophen in a 24 hour period from all sources.  If you are taking extra strength Tylenol/acetaminophen (500 mg), the maximum  "dose is 6 tablets in 24 hours.  If you are taking regular strength acetaminophen (325 mg), the maximum dose is 9 tablets in 24 hours.    Call a doctor for any of the following:  Signs of infection (fever, growing tenderness at the surgery site, a large amount of drainage or bleeding, severe pain, foul-smelling drainage, redness, swelling).  It has been over 8 to 10 hours since surgery and you are still not able to urinate (pass water).  Headache for over 24 hours.  Numbness, tingling or weakness the day after surgery (if you had spinal anesthesia).  Signs of Covid-19 infection (temperature over 100 degrees, shortness of breath, cough, loss of taste/smell, generalized body aches, persistent headache, chills, sore throat, nausea/vomiting/diarrhea)  Your doctor is:       Dr. Stephon Morgan, General Surgery: [phone]               Or dial [phone] and ask for the resident on call for:  General Surgery  For emergency care, call the:  [facility]:  [phone] (TTY for hearing impaired: [phone])    Information about liposomal bupivacaine (Exparel)    What is Liposomal Bupivacaine?    Liposomal Bupivacaine is a numbing medication that can help you manage your pain after surgery.  This medication is similar to \"novacaine,\" which is often used by the dentist.  Liposomal bupivacaine is released slowly and can help control pain for up to 72 hours.    What is the purpose of Liposomal Bupivacaine?  To manage your pain after surgery  To help you sleep better, take deep breaths, walk more comfortable, and feel up to visiting with others    How is the procedure done?  Liposomal bupivacaine is a medication given by an injection.  It is usually given right before your surgery.  If this is the case, you will be awake or sedated, but you should experience minimal pain during the procedure.  For some people, the injection may be given at the very end of your surgery.  It all depends on the type of surgery " "and your situation.  The procedure usually takes about 5-15 minutes.  An ultrasound machine will help the anesthesiologist insert it in the right place or the surgeon will inject it under direct vision.   A needle is used to place the numbing medication under your skin.  It provides pain relief by numbing the tissue in the area where your surgeon will make the incision.    What can I expect?  You may experience numbness, tingling, or a feeling of heaviness around the area that was injected.  If you experience any of the follow symptoms IMMEDIATELY CALL THE REGIONAL ANESTHESIA PAIN SERVICE:  Numbness or tingling occurs in areas other than around the injection site  Blurry vision  Ringing in your ears  A metallic taste in your mouth    CALL the Regional Anesthesia Pain Service at:  [phone].  Press \"4\" for the  and let the hospital  know that you are having a problem with a nerve block and that you would like to page the \"adult care inpatient pain management/[facility] team\".  From 7 am - 5 pm, page the \"staff\" physician  From 5 pm - 7 am, page the \"resident\" physician (if no response from \"resident\" physician, call the  back and the \"staff\" physician).    You should not receive any other type of numbing medication within 4 days after receiving liposomal bupivacaine unless your anesthesiologist approves.    Post Operative Instructions: Regional Anesthetic with Liposomal Bupivacaine for Chest and Abdominal Surgery  General Information:   Regional anesthesia is when local anesthetic or  numbing  medication is injected around the nerves to anesthetize or  numb  the area supplied by that set of nerves.     Types of Regional Blocks:  Transversus Abdominis Plane (TAP): A block injected beneath the covering of a muscle layer of the abdomen for abdominal surgery  Pectoral: A block injected near the breast for surgery on the breast and armpit  Paravertebral: A block injected in the back for " surgery on the chest, ribs, and breast    Procedure:  The type of anesthesia your doctor used to numb your chest or abdomen will usually not wear off for 24-48 hours, but may last as long as 72 hours.     Diet:  There are no restrictions on your diet. You should drink plenty of fluids.     Discomfort:  You will have a tingling and prickly sensation in your chest or abdomen as the feeling begins to return. You can also expect some discomfort. The amount of discomfort is unpredictable, but if you have more pain than can be controlled with pain medication you should notify your physician.     Pain Medicine:   Begin taking your oral pain pills before bedtime and during the night to avoid a sudden onset of pain as part of the block wears off.  Do not engage in drinking, driving, or hazardous occupations while taking pain medication.     Stitches:   You may have stitches or special skin closures. You doctor will inform you when to return to the office to have them removed.

## 2022-09-12 ENCOUNTER — PATIENT OUTREACH (OUTPATIENT)
Dept: SURGERY | Facility: CLINIC | Age: 42
End: 2022-09-12

## 2022-09-12 NOTE — PROGRESS NOTES
RN Post-Op/Post-Discharge Care Coordination Note    Mr. Rodolof Bennett is a 42 year old male who underwent robotic cholecystectomy on 9/9 with  Dr. Stephon Morgan.  Spoke with Patient.    Support  Patient able to care for self independently     Health Status  Nausea/Vomiting: Patient denies nausea/vomiting.  Eating/drinking: Patient is able to eat and drink without any complaints.  Bowel habits: Patient reports having a normal bowel movement.  Drains (SNEHA): N/A  Fevers/chills: Patient denies any fever or chills.  Incisions: Patient denies any signs and symptoms of infection..  Wound closure:  Steri-strips  Pain: None. He is not taking any analgesics.  New Medications:  Oxycodone    Activity/Restrictions  No lifting in excess of 15-20 pounds for 3-4 weeks    Equipment  None    Pathology reviewed with patient:  No, not yet available    Forms/Letters  No    All of his questions were answered including reviewing restrictions and wound care.  He will call this office if he has any further questions and/or concerns.      In lieu of a post-op clinic patient that patient would like to be contacted in approximately 7-10 days via telephone.    A copy of this note was routed to the primary surgeon.      Whom and When to Call  Patient acknowledges understanding of how to manage any medication changes and   when to seek medical care.     Patient advised that if after hour medical concerns arise to please call [phone] and choose option 4 to speak to the physician on call.

## 2022-09-13 ENCOUNTER — PRE VISIT (OUTPATIENT)
Dept: SURGERY | Facility: CLINIC | Age: 42
End: 2022-09-13

## 2022-09-13 LAB
PATH REPORT.COMMENTS IMP SPEC: NORMAL
PATH REPORT.FINAL DX SPEC: NORMAL
PATH REPORT.GROSS SPEC: NORMAL
PATH REPORT.MICROSCOPIC SPEC OTHER STN: NORMAL
PATH REPORT.RELEVANT HX SPEC: NORMAL
PHOTO IMAGE: NORMAL

## 2022-09-20 ENCOUNTER — PATIENT OUTREACH (OUTPATIENT)
Dept: SURGERY | Facility: CLINIC | Age: 42
End: 2022-09-20

## 2022-09-20 NOTE — PROGRESS NOTES
Rodolfo Bennett was contacted several days post procedure via telephone for a status update and elected to have a telephone follow -up call approximately 7-10 days after original contact in lieu of a clinic visit with Dr. Stephon Morgan.  He continues to do well and the steri-strips  have peeled off.  The patients wounds are healed and the area is C/D/I.  He is afebrile, pain free, and mariola po however, feeling burning sensation after eating that does resolve over time. He is not medicating and is uncertain if his diet is triggering this- asked to monitor diet and eat smaller portions. The patient is slowly resuming his normal activity.   Discussed restrictions including no lifting in excess of 15-20 pounds for 3 weeks.    Pathology was reviewed: Yes    All of Rodolfo Bennett question's were answered and a post op video visit with arranged with Dr. Morgan.    A copy of this note was routed to the patients surgeon.      Pathology:  Case Report   Surgical Pathology Report                         Case: [id number]                                   Authorizing Provider:  Stephon Morgan MD   Collected:           09/09/2022 01:52 PM           Ordering Location:     [facility] OR  Received:            09/09/2022 02:22 PM                                  [place]                                                                   Pathologist:           Prosper Kim MD                                                            Specimen:    Gallbladder, gallbladder                                                                   Final Diagnosis   GALLBLADDER, CHOLECYSTECTOMY:  Cholelithiases

## 2022-09-27 ASSESSMENT — PAIN SCALES - GENERAL: PAINLEVEL: NO PAIN (0)

## 2022-09-27 NOTE — PROGRESS NOTES
Rodolfo Bennett is a 42 year old who is being evaluated via a billable video visit.      How would you like to obtain your AVS? MyChart  If the video visit is dropped, the invitation should be resent by: Text to cell phone: [phone]  Will anyone else be joining your video visit? No  If patient encounters technical issues they should call [phone]    During this virtual visit the patient is located in [place], patient verifies this as the location during the entirety of this visit.     Video-Visit Details  Video Start Time: 9:30AM    Type of service:  Video Visit    Video End Time:9:40AM    Originating Location (pt. Location): Home  Distant Location (provider location):  [facility]  Platform used for Video Visit: Delfin Castañeda, ZORAN 9/27/2022      2:19 PM      I spoke with Rodolfo Bennett after his elective cholecystectomy.      He is doing well. Tolerating a diet, no diarrhea, no pain, no infectious symptoms.  Showering without issue.  Very happy with the result.    PE:  A+Ox3, NAD  Pleasant  No resp distress  No jaundice    A/P:  Uncomplicated recovery. Follow-up as needed

## 2022-09-28 ENCOUNTER — VIRTUAL VISIT (OUTPATIENT)
Dept: SURGERY | Facility: CLINIC | Age: 42
End: 2022-09-28
Payer: COMMERCIAL

## 2022-09-28 VITALS — WEIGHT: 192 LBS | BODY MASS INDEX: 29.1 KG/M2 | HEIGHT: 68 IN

## 2022-09-28 DIAGNOSIS — Z98.890 POST-OPERATIVE STATE: Primary | ICD-10-CM

## 2022-09-28 PROCEDURE — 99024 POSTOP FOLLOW-UP VISIT: CPT | Mod: 95 | Performed by: SURGERY

## 2022-09-28 NOTE — PATIENT INSTRUCTIONS
You met with Dr. Stephon Morgan.      Today's visit instructions:    Return to the Surgery Clinic on an as needed basis.        If you have questions please contact Karen RN or Zulema RN during regular clinic hours, Monday through Friday 7:30 AM - 4:00 PM, or you can contact us via [employer] at anytime.       If you have urgent needs after-hours, weekends, or holidays please call the hospital at [phone] and ask to speak with our on-call General Surgery Team.    Appointment [phone]  Nurse Advice (Karen or Zulema): [phone]   Surgery Scheduler (Ryan or Loyda): [phone]  Fax: [phone]

## 2022-09-28 NOTE — NURSING NOTE
"Chief Complaint   Patient presents with     RECHECK     Post-op, burning after eating       Vitals:    09/27/22 1419   Weight: 87.1 kg (192 lb)   Height: 1.727 m (5' 8\")       Body mass index is 29.19 kg/m .                          Kvng Castañeda, EMT    "

## 2022-09-28 NOTE — LETTER
9/28/2022       RE: Rodolfo Bennett  [address] [facility] [place] [address]     Dear Colleague,    Thank you for referring your patient, Rodolfo Bennett, to the [facility] [place] [facility]. Please see a copy of my visit note below.      I spoke with Rodolfo Bennett after his elective cholecystectomy.      He is doing well. Tolerating a diet, no diarrhea, no pain, no infectious symptoms.  Showering without issue.  Very happy with the result.    PE:  A+Ox3, NAD  Pleasant  No resp distress  No jaundice    A/P:  Uncomplicated recovery. Follow-up as needed      Sincerely,    Stephon Morgan MD

## 2023-01-09 ENCOUNTER — TELEPHONE (OUTPATIENT)
Dept: PEDIATRICS | Facility: CLINIC | Age: 43
End: 2023-01-09

## 2023-01-09 DIAGNOSIS — F41.1 ANXIETY STATE: Primary | ICD-10-CM

## 2023-01-09 DIAGNOSIS — F41.9 ANXIETY: ICD-10-CM

## 2023-01-09 RX ORDER — CITALOPRAM HYDROBROMIDE 10 MG/1
10 TABLET ORAL DAILY
Qty: 30 TABLET | Refills: 0 | Status: SHIPPED | OUTPATIENT
Start: 2023-01-09 | End: 2023-01-23

## 2023-01-09 NOTE — TELEPHONE ENCOUNTER
Patient scheduled an appointment for 01/23/2023 with Dr. Loredo. Patient called again to request the citalopram 10 mg.     Please advise. Discuss at Appointment on 01/23/2023?   TYSHAWN Alanis on 1/9/2023 at 5:44 PM

## 2023-01-09 NOTE — TELEPHONE ENCOUNTER
"Patient states he came in and scheduled a visit for 01/23/2023. Requesting a refill of the Citalopram that he requested earlier, stating he \"just need to feel happy again\". Advised I can resend the request to his provider.     Please see other encounter for this.   TYSHAWN Alanis on 1/9/2023 at 5:38 PM      "

## 2023-01-09 NOTE — TELEPHONE ENCOUNTER
"Patient calling requesting refill of citalopram medication. Patient reportedly takes this medication as needed. Last refill of this medication in chart is from 01/26/2021 from provider Tim Hogue MD virtual urgent care provider. RN advised visit would be needed in order to receive medication. Patient inquiring why medication cannot be refilled without visit, RN advised as there has been a break in medication and the last prescribing provider was not his PCP, a visit would be needed. RN offered appointment 1/10/23 w/ PCP regarding medication (approval given by station B). Patient declines. Patient agitated and wants prescription filled today. Patient states, \"you are not being helpful, I want to know your manager, I will be getting this medication today no matter what, rest assured\". RN gave patient relation number to patient to contact. Patient states \"You are very bad, rest assured I will follow this down forever\". RN ended call.     Nathan KENNY RN 1/9/2023 at 8:31 AM    "

## 2023-01-09 NOTE — TELEPHONE ENCOUNTER
Reason for call:  Medication   If this is a refill request, has the caller requested the refill from the pharmacy already? No  Will the patient be using a [facility]? Yes  Name of the pharmacy and phone number for the current request: [facility]    Name of the medication requested: Citalopram 10mg,  Patient takes it for anxiety as needed and not on a daily basis.  The bottle he has  in .  Patient is only looking for a few days supply.     Other request:     Phone number to reach patient:  Cell number on file:    Telephone Information:   Mobile [phone]       Best Time:  Before 12pm    Can we leave a detailed message on this number?  YES    Travel screening: Not Applicable

## 2023-01-10 NOTE — TELEPHONE ENCOUNTER
Patient Returning Call    Reason for call:  Patient is calling, again, to request refill of citalopram.      Information relayed to patient:  Patient informed it needs to be called in advance and one day is not sufficient to get okayed.      Patient has additional questions:  Yes, please call ASAP      What are your questions/concerns:  Refill of citalopram.      Okay to leave a detailed message?: Yes at Cell number on file:    Telephone Information:   Mobile [phone]

## 2023-01-23 ENCOUNTER — VIRTUAL VISIT (OUTPATIENT)
Dept: PEDIATRICS | Facility: CLINIC | Age: 43
End: 2023-01-23
Payer: COMMERCIAL

## 2023-01-23 DIAGNOSIS — F33.0 MILD EPISODE OF RECURRENT MAJOR DEPRESSIVE DISORDER (H): Primary | ICD-10-CM

## 2023-01-23 DIAGNOSIS — F41.1 ANXIETY STATE: ICD-10-CM

## 2023-01-23 PROBLEM — K80.81 BILIARY CALCULUS OF OTHER SITE WITH OBSTRUCTION: Status: RESOLVED | Noted: 2022-08-31 | Resolved: 2023-01-23

## 2023-01-23 PROCEDURE — 99213 OFFICE O/P EST LOW 20 MIN: CPT | Mod: 93 | Performed by: INTERNAL MEDICINE

## 2023-01-23 RX ORDER — CITALOPRAM HYDROBROMIDE 10 MG/1
10 TABLET ORAL DAILY
Qty: 90 TABLET | Refills: 1 | Status: SHIPPED | OUTPATIENT
Start: 2023-01-23 | End: 2023-07-13

## 2023-01-23 NOTE — PROGRESS NOTES
Rodolfo is a 42 year old who is being evaluated via a billable telephone visit.      Distant Location (provider location):  On-site    Assessment & Plan     (F33.0) Mild episode of recurrent major depressive disorder (H)  (primary encounter diagnosis)  Comment: recurrent sx, resume citalopram / rtc 3 months  Plan: citalopram (CELEXA) 10 MG tablet            Return in about 3 months (around 4/23/2023).    Kennedy Loredo MD  [facility] Reynolds is a 42 year old, presenting for the following health issues:    Depression Followup    How are you doing with your depression since your last visit? Recurrent sx past few weeks    Are you having other symptoms that might be associated with depression? Mild anxiety.  celexa has helped previously    Have you had a significant life event?  financial     Are you feeling anxious or having panic attacks?   mild    Do you have any concerns with your use of alcohol or other drugs? No    Social History     Tobacco Use     Smoking status: Never     Smokeless tobacco: Never   Vaping Use     Vaping Use: Never used   Substance Use Topics     Alcohol use: No     Drug use: No       Patient Active Problem List   Diagnosis     Anxiety state     Non morbid obesity due to excess calories     Gastritis without bleeding, unspecified chronicity, unspecified gastritis type     Mild episode of recurrent major depressive disorder (H)     Current Outpatient Medications   Medication Sig Dispense Refill     citalopram (CELEXA) 10 MG tablet Take 1 tablet (10 mg) by mouth daily 90 tablet 1            Review of Systems         Objective           Vitals:  No vitals were obtained today due to virtual visit.    Physical Exam     PSYCH: Alert and oriented times 3; coherent speech, normal   rate and volume, able to articulate logical thoughts, able   to abstract reason, no tangential thoughts, no hallucinations   or delusions  His affect is normal  RESP: No cough, no audible  wheezing, able to talk in full sentences  Remainder of exam unable to be completed due to telephone visits                Phone call duration: 8 minutes

## 2023-04-24 ENCOUNTER — OFFICE VISIT (OUTPATIENT)
Dept: PEDIATRICS | Facility: CLINIC | Age: 43
End: 2023-04-24
Payer: COMMERCIAL

## 2023-04-24 VITALS
OXYGEN SATURATION: 100 % | SYSTOLIC BLOOD PRESSURE: 118 MMHG | HEIGHT: 69 IN | DIASTOLIC BLOOD PRESSURE: 74 MMHG | WEIGHT: 209 LBS | RESPIRATION RATE: 16 BRPM | HEART RATE: 58 BPM | BODY MASS INDEX: 30.96 KG/M2 | TEMPERATURE: 97.9 F

## 2023-04-24 DIAGNOSIS — F41.1 ANXIETY STATE: ICD-10-CM

## 2023-04-24 DIAGNOSIS — Z11.3 SCREEN FOR STD (SEXUALLY TRANSMITTED DISEASE): ICD-10-CM

## 2023-04-24 DIAGNOSIS — Z13.220 LIPID SCREENING: ICD-10-CM

## 2023-04-24 DIAGNOSIS — E55.9 VITAMIN D DEFICIENCY: ICD-10-CM

## 2023-04-24 DIAGNOSIS — Z00.00 ROUTINE GENERAL MEDICAL EXAMINATION AT A HEALTH CARE FACILITY: Primary | ICD-10-CM

## 2023-04-24 DIAGNOSIS — K21.00 GASTROESOPHAGEAL REFLUX DISEASE WITH ESOPHAGITIS, UNSPECIFIED WHETHER HEMORRHAGE: ICD-10-CM

## 2023-04-24 PROBLEM — K29.70 GASTRITIS WITHOUT BLEEDING, UNSPECIFIED CHRONICITY, UNSPECIFIED GASTRITIS TYPE: Status: RESOLVED | Noted: 2022-08-31 | Resolved: 2023-04-24

## 2023-04-24 LAB
ALBUMIN SERPL BCG-MCNC: 4.3 G/DL (ref 3.5–5.2)
ALP SERPL-CCNC: 67 U/L (ref 40–129)
ALT SERPL W P-5'-P-CCNC: 35 U/L (ref 10–50)
ANION GAP SERPL CALCULATED.3IONS-SCNC: 14 MMOL/L (ref 7–15)
AST SERPL W P-5'-P-CCNC: 28 U/L (ref 10–50)
BILIRUB SERPL-MCNC: 0.3 MG/DL
BUN SERPL-MCNC: 11 MG/DL (ref 6–20)
CALCIUM SERPL-MCNC: 9.2 MG/DL (ref 8.6–10)
CHLORIDE SERPL-SCNC: 103 MMOL/L (ref 98–107)
CHOLEST SERPL-MCNC: 209 MG/DL
CREAT SERPL-MCNC: 0.89 MG/DL (ref 0.67–1.17)
DEPRECATED CALCIDIOL+CALCIFEROL SERPL-MC: 14 UG/L (ref 20–75)
DEPRECATED HCO3 PLAS-SCNC: 24 MMOL/L (ref 22–29)
GFR SERPL CREATININE-BSD FRML MDRD: >90 ML/MIN/1.73M2
GLUCOSE SERPL-MCNC: 108 MG/DL (ref 70–99)
HDLC SERPL-MCNC: 47 MG/DL
HIV 1+2 AB+HIV1 P24 AG SERPL QL IA: NONREACTIVE
LDLC SERPL CALC-MCNC: 142 MG/DL
NONHDLC SERPL-MCNC: 162 MG/DL
POTASSIUM SERPL-SCNC: 4.4 MMOL/L (ref 3.4–5.3)
PROT SERPL-MCNC: 7.1 G/DL (ref 6.4–8.3)
SODIUM SERPL-SCNC: 141 MMOL/L (ref 136–145)
T PALLIDUM AB SER QL: NONREACTIVE
TRIGL SERPL-MCNC: 101 MG/DL

## 2023-04-24 PROCEDURE — 99396 PREV VISIT EST AGE 40-64: CPT | Performed by: INTERNAL MEDICINE

## 2023-04-24 PROCEDURE — 86780 TREPONEMA PALLIDUM: CPT | Performed by: INTERNAL MEDICINE

## 2023-04-24 PROCEDURE — 80061 LIPID PANEL: CPT | Performed by: INTERNAL MEDICINE

## 2023-04-24 PROCEDURE — 87591 N.GONORRHOEAE DNA AMP PROB: CPT | Performed by: INTERNAL MEDICINE

## 2023-04-24 PROCEDURE — 99213 OFFICE O/P EST LOW 20 MIN: CPT | Mod: 25 | Performed by: INTERNAL MEDICINE

## 2023-04-24 PROCEDURE — 82306 VITAMIN D 25 HYDROXY: CPT | Performed by: INTERNAL MEDICINE

## 2023-04-24 PROCEDURE — 80053 COMPREHEN METABOLIC PANEL: CPT | Performed by: INTERNAL MEDICINE

## 2023-04-24 PROCEDURE — 87491 CHLMYD TRACH DNA AMP PROBE: CPT | Performed by: INTERNAL MEDICINE

## 2023-04-24 PROCEDURE — 87389 HIV-1 AG W/HIV-1&-2 AB AG IA: CPT | Performed by: INTERNAL MEDICINE

## 2023-04-24 PROCEDURE — 36415 COLL VENOUS BLD VENIPUNCTURE: CPT | Performed by: INTERNAL MEDICINE

## 2023-04-24 RX ORDER — PANTOPRAZOLE SODIUM 40 MG/1
40 TABLET, DELAYED RELEASE ORAL DAILY PRN
Qty: 90 TABLET | Refills: 1 | Status: SHIPPED | OUTPATIENT
Start: 2023-04-24 | End: 2024-04-18

## 2023-04-24 ASSESSMENT — PAIN SCALES - GENERAL: PAINLEVEL: NO PAIN (0)

## 2023-04-24 ASSESSMENT — PATIENT HEALTH QUESTIONNAIRE - PHQ9: SUM OF ALL RESPONSES TO PHQ QUESTIONS 1-9: 0

## 2023-04-24 NOTE — PROGRESS NOTES
SUBJECTIVE:   CC: Rodolfo is an 42 year old who presents for preventative health visit.       4/24/2023     9:37 AM   Additional Questions   Roomed by uriah   Accompanied by luis miguel     Healthy Habits:     Getting at least 3 servings of Calcium per day:  Yes    Bi-annual eye exam:  NO    Dental care twice a year:  Yes    Sleep apnea or symptoms of sleep apnea:  None    Diet:  Regular (no restrictions)    Frequency of exercise:  None    Duration of exercise:  Less than 15 minutes    Taking medications regularly:  Yes    Barriers to taking medications:  Not applicable    Medication side effects:  None    PHQ-2 Total Score: 0    Additional concerns today:  No        Today's PHQ-2 Score:       12/14/2015     2:48 PM   PHQ-2 ( 1999 [employer])   Q1: Little interest or pleasure in doing things 0   Q2: Feeling down, depressed or hopeless 0   PHQ-2 Score 0       Have you ever done Advance Care Planning? (For example, a Health Directive, POLST, or a discussion with a medical provider or your loved ones about your wishes): No, advance care planning information given to patient to review.  Patient declined advance care planning discussion at this time.    Social History     Tobacco Use     Smoking status: Never     Smokeless tobacco: Never   Vaping Use     Vaping status: Never Used   Substance Use Topics     Alcohol use: No           Last PSA: No results found for: PSA    Reviewed orders with patient. Reviewed health maintenance and updated orders accordingly - Yes      Reviewed and updated as needed this visit by clinical staff   Tobacco  Allergies  Meds              Reviewed and updated as needed this visit by Provider                 Patient Active Problem List   Diagnosis     Anxiety state     Non morbid obesity due to excess calories     Mild episode of recurrent major depressive disorder (H)     Vitamin D deficiency     Current Outpatient Medications   Medication Sig Dispense Refill     citalopram (CELEXA) 10 MG tablet Take 1  "tablet (10 mg) by mouth daily 90 tablet 1     pantoprazole (PROTONIX) 40 MG EC tablet Take 1 tablet (40 mg) by mouth daily as needed for heartburn 90 tablet 1        Review of Systems  CONSTITUTIONAL: NEGATIVE for fever, chills, change in weight  INTEGUMENTARY/SKIN: NEGATIVE for worrisome rashes, moles or lesions  EYES: NEGATIVE for vision changes or irritation  ENT: NEGATIVE for ear, mouth and throat problems  RESP: NEGATIVE for significant cough or SOB  CV: NEGATIVE for chest pain, palpitations or peripheral edema  GI: NEGATIVE for nausea, abdominal pain, heartburn, or change in bowel habits   male: negative for dysuria, hematuria, decreased urinary stream, erectile dysfunction, urethral discharge  MUSCULOSKELETAL: NEGATIVE for significant arthralgias or myalgia  NEURO: NEGATIVE for weakness, dizziness or paresthesias  PSYCHIATRIC: NEGATIVE for changes in mood or affect    OBJECTIVE:   /74 (Cuff Size: Adult Regular)   Pulse 58   Temp 97.9  F (36.6  C) (Tympanic)   Resp 16   Ht 1.74 m (5' 8.5\")   Wt 94.8 kg (209 lb)   SpO2 100%   BMI 31.32 kg/m      Physical Exam  GENERAL: healthy, alert and no distress  EYES: Eyes grossly normal to inspection, PERRL and conjunctivae and sclerae normal  HENT: ear canals and TM's normal, nose and mouth without ulcers or lesions  NECK: no adenopathy, no asymmetry, masses, or scars and thyroid normal to palpation  RESP: lungs clear to auscultation - no rales, rhonchi or wheezes  CV: regular rate and rhythm, normal S1 S2, no S3 or S4, no murmur, click or rub, no peripheral edema and peripheral pulses strong  ABDOMEN: soft, nontender, no hepatosplenomegaly, no masses and bowel sounds normal  MS: no gross musculoskeletal defects noted, no edema  SKIN: no suspicious lesions or rashes  NEURO: Normal strength and tone, mentation intact and speech normal  PSYCH: mentation appears normal, affect normal/bright    ASSESSMENT/PLAN:   (Z00.00) Routine general medical examination at " "a health care facility  (primary encounter diagnosis)    (F41.1) Anxiety state  Comment:   Plan: improved with lifestyle measures: eliminated caffeine, breathing exercises.  May resume citalopram in the future/but feels he doesnt need it now    (K21.00) Gastroesophageal reflux disease with esophagitis, unspecified whether hemorrhage  Comment: takes ppi 1-2 times per week for GERD  Plan: pantoprazole (PROTONIX) 40 MG EC tablet          (Z13.220) Lipid screening  Comment:   Plan: Comprehensive metabolic panel (BMP + Alb, Alk         Phos, ALT, AST, Total. Bili, TP), Lipid Profile        (Chol, Trig, HDL, LDL calc)            (Z11.3) Screen for STD (sexually transmitted disease)  Comment:   Plan: HIV Antigen Antibody Combo, NEISSERIA         GONORRHOEA PCR, CHLAMYDIA TRACHOMATIS PCR,         Treponema Abs w Reflex to RPR and Titer            (E55.9) Vitamin D deficiency  Comment:   Plan: Vitamin D Deficiency              COUNSELING:   Reviewed preventive health counseling, as reflected in patient instructions       Regular exercise       Healthy diet/nutrition      BMI:   Estimated body mass index is 31.32 kg/m  as calculated from the following:    Height as of this encounter: 1.74 m (5' 8.5\").    Weight as of this encounter: 94.8 kg (209 lb).   Weight management plan: Discussed healthy diet and exercise guidelines      He reports that he has never smoked. He has never used smokeless tobacco.            Kennedy Loredo MD  [facility]  "

## 2023-04-24 NOTE — PATIENT INSTRUCTIONS
Apps for meditation: Headspace, Calm    Preventive Health Recommendations  Male Ages 40 to 49    Yearly exam:             See your health care provider every year in order to  o   Review health changes.   o   Discuss preventive care.    o   Review your medicines if your doctor has prescribed any.  You should be tested each year for STDs (sexually transmitted diseases) if you re at risk.   Have a cholesterol test every 5 years.   Have a colonoscopy (test for colon cancer) if someone in your family has had colon cancer or polyps before age 50.   After age 45, have a diabetes test (fasting glucose). If you are at risk for diabetes, you should have this test every 3 years.    Talk with your health care provider about whether or not a prostate cancer screening test (PSA) is right for you.    Shots: Get a flu shot each year. Get a tetanus shot every 10 years.     Nutrition:  Eat at least 5 servings of fruits and vegetables daily.   Eat whole-grain bread, whole-wheat pasta and brown rice instead of white grains and rice.   Get adequate Calcium and Vitamin D.     Lifestyle  Exercise for at least 150 minutes a week (30 minutes a day, 5 days a week). This will help you control your weight and prevent disease.   Limit alcohol to one drink per day.   No smoking.   Wear sunscreen to prevent skin cancer.   See your dentist every six months for an exam and cleaning.

## 2023-04-24 NOTE — LETTER
May 1, 2023      Rodolfo Andrea Bennett  [address] [place] [id number]        Dear ,    We are writing to inform you of your test results.    Regarding the labs from your most recent visit:       Your vitamin D level is low.    Recommend starting a daily vitamin D3 supplement.  Vitamin D3-- take one 50mcg tablet daily for 1 month and return for labdraw in 4 weeks (the [employer] over-the-counter brand is reasonable)   Your LDL cholesterol is high.   High LDL cholesterol will improve with diet changes --specifically reducing dietary saturated fat (ex. red meat/processed meats, dairy fat, fried foods and foods containing partially hydrogenated oils (trans fats))       Your blood chemistries, kidney and liver function tests are normal.  However, your fasting blood sugar is elevated: Your labwork is consistent with Prediabetes, defined as a fasting blood sugar between 100 and 125. Prediabetes puts you at risk for Adult Onset (type 2) Diabetes.  You can prevent or delay the development of Type 2 Diabetes through lifestyle changes including modest weight loss and regular exercise.  Reducing your weight through daily calorie restriction and modest physical activity for 30 minutes every day can often return elevated blood glucose levels to the normal range.   [facility] offers group class sessions for those newly diagnosed with prediabetes. Diabetes educators will lead discussions and provide you with helpful tips and tools for living well and reducing your risk of developing diabetes.   Pre-diabetes classes are community education classes and do not require a referral.   For questions or more information regarding Pre-diabetes education classes, call the Diabetes Education Team at [phone] ([place] locations) or [phone] ([facility] locations).        The STD screening tests (HIV, gonorrhea, chlamydia and syphilis) are negative.     Resulted Orders   Comprehensive metabolic panel (BMP + Alb, Alk  Phos, ALT, AST, Total. Bili, TP)   Result Value Ref Range    Sodium 141 136 - 145 mmol/L    Potassium 4.4 3.4 - 5.3 mmol/L    Chloride 103 98 - 107 mmol/L    Carbon Dioxide (CO2) 24 22 - 29 mmol/L    Anion Gap 14 7 - 15 mmol/L    Urea Nitrogen 11.0 6.0 - 20.0 mg/dL    Creatinine 0.89 0.67 - 1.17 mg/dL    Calcium 9.2 8.6 - 10.0 mg/dL    Glucose 108 (H) 70 - 99 mg/dL    Alkaline Phosphatase 67 40 - 129 U/L    AST 28 10 - 50 U/L    ALT 35 10 - 50 U/L    Protein Total 7.1 6.4 - 8.3 g/dL    Albumin 4.3 3.5 - 5.2 g/dL    Bilirubin Total 0.3 <=1.2 mg/dL    GFR Estimate >90 >60 mL/min/1.73m2      Comment:      eGFR calculated using 2021 CKD-EPI equation.   Lipid Profile (Chol, Trig, HDL, LDL calc)   Result Value Ref Range    Cholesterol 209 (H) <200 mg/dL    Triglycerides 101 <150 mg/dL    Direct Measure HDL 47 >=40 mg/dL    LDL Cholesterol Calculated 142 (H) <=100 mg/dL    Non HDL Cholesterol 162 (H) <130 mg/dL    Narrative    Cholesterol  Desirable:  <200 mg/dL    Triglycerides  Normal:  Less than 150 mg/dL  Borderline High:  150-199 mg/dL  High:  200-499 mg/dL  Very High:  Greater than or equal to 500 mg/dL    Direct Measure HDL  Female:  Greater than or equal to 50 mg/dL   Male:  Greater than or equal to 40 mg/dL    LDL Cholesterol  Desirable:  <100mg/dL  Above Desirable:  100-129 mg/dL   Borderline High:  130-159 mg/dL   High:  160-189 mg/dL   Very High:  >= 190 mg/dL    Non HDL Cholesterol  Desirable:  130 mg/dL  Above Desirable:  130-159 mg/dL  Borderline High:  160-189 mg/dL  High:  190-219 mg/dL  Very High:  Greater than or equal to 220 mg/dL   HIV Antigen Antibody Combo   Result Value Ref Range    HIV Antigen Antibody Combo Nonreactive Nonreactive      Comment:      HIV-1 p24 Ag & HIV-1/HIV-2 Ab Not Detected   NEISSERIA GONORRHOEA PCR   Result Value Ref Range    Neisseria gonorrhoeae Negative Negative      Comment:      Negative for N. gonorrhoeae rRNA by transcription mediated amplification. A negative result by  transcription mediated amplification does not preclude the presence of C. trachomatis infection because results are dependent on proper and adequate collection, absence of inhibitors and sufficient rRNA to be detected.   CHLAMYDIA TRACHOMATIS PCR   Result Value Ref Range    Chlamydia trachomatis Negative Negative      Comment:      A negative result by transcription mediated amplification does not preclude the presence of C. trachomatis infection because results are dependent on proper and adequate collection, absence of inhibitors and sufficient rRNA to be detected.   Treponema Abs w Reflex to RPR and Titer   Result Value Ref Range    Treponema Antibody Total Nonreactive Nonreactive   Vitamin D Deficiency   Result Value Ref Range    Vitamin D, Total (25-Hydroxy) 14 (L) 20 - 75 ug/L    Narrative    Season, race, dietary intake, and treatment affect the concentration of 25-hydroxy-Vitamin D. Values may decrease during winter months and increase during summer months. Values 20-29 ug/L may indicate Vitamin D insufficiency and values <20 ug/L may indicate Vitamin D deficiency.    Vitamin D determination is routinely performed by an immunoassay specific for 25 hydroxyvitamin D3.  If an individual is on vitamin D2(ergocalciferol) supplementation, please specify 25 OH vitamin D2 and D3 level determination by LCMSMS test [id number].         If you have any questions or concerns, please call the clinic at the number listed above.       Sincerely,      Kennedy Loredo MD

## 2023-04-25 LAB
C TRACH DNA SPEC QL NAA+PROBE: NEGATIVE
N GONORRHOEA DNA SPEC QL NAA+PROBE: NEGATIVE

## 2023-04-30 PROBLEM — R73.03 PREDIABETES: Status: ACTIVE | Noted: 2023-04-30

## 2023-07-13 ENCOUNTER — OFFICE VISIT (OUTPATIENT)
Dept: FAMILY MEDICINE | Facility: CLINIC | Age: 43
End: 2023-07-13
Payer: COMMERCIAL

## 2023-07-13 VITALS
WEIGHT: 209.8 LBS | BODY MASS INDEX: 31.44 KG/M2 | DIASTOLIC BLOOD PRESSURE: 78 MMHG | OXYGEN SATURATION: 97 % | HEART RATE: 54 BPM | SYSTOLIC BLOOD PRESSURE: 118 MMHG

## 2023-07-13 DIAGNOSIS — F33.0 MILD EPISODE OF RECURRENT MAJOR DEPRESSIVE DISORDER (H): Primary | ICD-10-CM

## 2023-07-13 DIAGNOSIS — R51.9 NONINTRACTABLE EPISODIC HEADACHE, UNSPECIFIED HEADACHE TYPE: ICD-10-CM

## 2023-07-13 PROCEDURE — 99214 OFFICE O/P EST MOD 30 MIN: CPT | Performed by: FAMILY MEDICINE

## 2023-07-13 RX ORDER — CITALOPRAM HYDROBROMIDE 10 MG/1
10 TABLET ORAL DAILY
Qty: 90 TABLET | Refills: 0 | Status: SHIPPED | OUTPATIENT
Start: 2023-07-13 | End: 2024-04-18

## 2023-07-13 ASSESSMENT — PAIN SCALES - GENERAL: PAINLEVEL: NO PAIN (0)

## 2023-07-13 NOTE — PROGRESS NOTES
Assessment & Plan     Mild episode of recurrent major depressive disorder (H)  Nonintractable episodic headache, unspecified headache type  - headaches are exacerbated by emotional stress/anxiety and excitement and when going off the medication (Celexa)     Plan:   Stay on - citalopram (CELEXA) 10 MG tablet continuously for at least 6 months before deciding to getting off of it ( and at that point will need to wean off) .  Avoid interruptions in taking as it may cause rebound headaches.   If excitement induced headaches are improving but not optimized, may increase dosage to 20 mg.     Follow up in 3 months to reassess.                  Sabrina Leach MD  [facility]    Jairo Reynolds is a 43 year old, here to discuss having anxiety and episodic headaches.   He notes that has been prescribed celexa for the anxiety and better while taking. The headaches are aggravated when stressed / anxious or emotionally excited.  Of note, he has only taken celexa temporarily (stopping it when feeling better ).           7/13/2023    12:17 PM   Additional Questions   Roomed by Stephon PATEL CMA       Review of Systems         Objective    /78 (BP Location: Right arm, Patient Position: Sitting, Cuff Size: Adult Regular)   Pulse 54   Wt 95.2 kg (209 lb 12.8 oz)   SpO2 97%   BMI 31.44 kg/m    Body mass index is 31.44 kg/m .     Physical Exam   GENERAL APPEARANCE: alert and no distress  NEURO: Normal strength and tone, mentation intact, speech normal and grossly intact

## 2023-11-03 ENCOUNTER — HOSPITAL ENCOUNTER (EMERGENCY)
Facility: CLINIC | Age: 43
Discharge: HOME | End: 2023-11-03
Attending: EMERGENCY MEDICINE | Admitting: EMERGENCY MEDICINE
Payer: COMMERCIAL

## 2023-11-03 ENCOUNTER — APPOINTMENT (OUTPATIENT)
Dept: GENERAL RADIOLOGY | Facility: CLINIC | Age: 43
End: 2023-11-03
Attending: EMERGENCY MEDICINE
Payer: COMMERCIAL

## 2023-11-03 VITALS
OXYGEN SATURATION: 97 % | HEART RATE: 81 BPM | TEMPERATURE: 98.5 F | SYSTOLIC BLOOD PRESSURE: 106 MMHG | HEIGHT: 68 IN | DIASTOLIC BLOOD PRESSURE: 98 MMHG | RESPIRATION RATE: 16 BRPM | BODY MASS INDEX: 31.9 KG/M2

## 2023-11-03 DIAGNOSIS — S39.012A LOW BACK STRAIN, INITIAL ENCOUNTER: ICD-10-CM

## 2023-11-03 DIAGNOSIS — M54.9 BACK PAIN: ICD-10-CM

## 2023-11-03 PROCEDURE — 250N000013 HC RX MED GY IP 250 OP 250 PS 637

## 2023-11-03 PROCEDURE — 72100 X-RAY EXAM L-S SPINE 2/3 VWS: CPT | Mod: 26 | Performed by: RADIOLOGY

## 2023-11-03 PROCEDURE — 72070 X-RAY EXAM THORAC SPINE 2VWS: CPT

## 2023-11-03 PROCEDURE — 72070 X-RAY EXAM THORAC SPINE 2VWS: CPT | Mod: 26 | Performed by: RADIOLOGY

## 2023-11-03 PROCEDURE — 99284 EMERGENCY DEPT VISIT MOD MDM: CPT | Performed by: EMERGENCY MEDICINE

## 2023-11-03 PROCEDURE — 99283 EMERGENCY DEPT VISIT LOW MDM: CPT | Performed by: EMERGENCY MEDICINE

## 2023-11-03 PROCEDURE — 72100 X-RAY EXAM L-S SPINE 2/3 VWS: CPT

## 2023-11-03 RX ORDER — LIDOCAINE 4 G/G
1 PATCH TOPICAL ONCE
Status: DISCONTINUED | OUTPATIENT
Start: 2023-11-03 | End: 2023-11-03 | Stop reason: HOSPADM

## 2023-11-03 RX ORDER — OXYCODONE HYDROCHLORIDE 10 MG/1
10 TABLET ORAL ONCE
Status: COMPLETED | OUTPATIENT
Start: 2023-11-03 | End: 2023-11-03

## 2023-11-03 RX ORDER — CYCLOBENZAPRINE HCL 5 MG
5 TABLET ORAL 3 TIMES DAILY PRN
Qty: 10 TABLET | Refills: 0 | Status: SHIPPED | OUTPATIENT
Start: 2023-11-03 | End: 2024-04-18

## 2023-11-03 RX ADMIN — LIDOCAINE 1 PATCH: 4 PATCH TOPICAL at 18:27

## 2023-11-03 RX ADMIN — OXYCODONE HYDROCHLORIDE 10 MG: 10 TABLET ORAL at 18:27

## 2023-11-03 ASSESSMENT — ACTIVITIES OF DAILY LIVING (ADL): ADLS_ACUITY_SCORE: 35
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
## 2023-11-04 NOTE — DISCHARGE INSTRUCTIONS
Utilize Tylenol and ibuprofen for pain  Consider utilizing lidocaine patches for pain as well  Use flexeril as needed for muscle spasms  Return to the ED if any worsening or concerning signs or symptoms present

## 2024-03-25 ENCOUNTER — PATIENT OUTREACH (OUTPATIENT)
Dept: CARE COORDINATION | Facility: CLINIC | Age: 44
End: 2024-03-25
Payer: COMMERCIAL

## 2024-04-08 ENCOUNTER — PATIENT OUTREACH (OUTPATIENT)
Dept: CARE COORDINATION | Facility: CLINIC | Age: 44
End: 2024-04-08
Payer: COMMERCIAL

## 2024-04-18 ENCOUNTER — OFFICE VISIT (OUTPATIENT)
Dept: PEDIATRICS | Facility: CLINIC | Age: 44
End: 2024-04-18
Payer: COMMERCIAL

## 2024-04-18 VITALS
OXYGEN SATURATION: 98 % | HEART RATE: 65 BPM | RESPIRATION RATE: 16 BRPM | TEMPERATURE: 97.8 F | SYSTOLIC BLOOD PRESSURE: 110 MMHG | HEIGHT: 69 IN | DIASTOLIC BLOOD PRESSURE: 62 MMHG | WEIGHT: 216.8 LBS | BODY MASS INDEX: 32.11 KG/M2

## 2024-04-18 DIAGNOSIS — F41.9 ANXIETY: ICD-10-CM

## 2024-04-18 DIAGNOSIS — K21.00 GASTROESOPHAGEAL REFLUX DISEASE WITH ESOPHAGITIS, UNSPECIFIED WHETHER HEMORRHAGE: Primary | ICD-10-CM

## 2024-04-18 PROCEDURE — 99214 OFFICE O/P EST MOD 30 MIN: CPT | Performed by: INTERNAL MEDICINE

## 2024-04-18 RX ORDER — PANTOPRAZOLE SODIUM 40 MG/1
40 TABLET, DELAYED RELEASE ORAL DAILY PRN
Qty: 90 TABLET | Refills: 3 | Status: SHIPPED | OUTPATIENT
Start: 2024-04-18

## 2024-04-18 RX ORDER — HYDROXYZINE HYDROCHLORIDE 25 MG/1
25 TABLET, FILM COATED ORAL 3 TIMES DAILY PRN
Qty: 60 TABLET | Refills: 3 | Status: SHIPPED | OUTPATIENT
Start: 2024-04-18

## 2024-04-18 ASSESSMENT — PATIENT HEALTH QUESTIONNAIRE - PHQ9: SUM OF ALL RESPONSES TO PHQ QUESTIONS 1-9: 7

## 2024-04-18 ASSESSMENT — PAIN SCALES - GENERAL: PAINLEVEL: NO PAIN (0)

## 2024-04-18 NOTE — PROGRESS NOTES
"  Assessment & Plan     (K21.00) Gastroesophageal reflux disease with esophagitis, unspecified whether hemorrhage  (primary encounter diagnosis)  Comment: Well-controlled, continue pantoprazole.  Discussed reflux precautions and avoiding trigger foods (reducing coffee has helped)  Plan: pantoprazole (PROTONIX) 40 MG EC tablet          (F41.9) Anxiety  Comment:   improved--Continues mindfulness, self cares.  Off SSRI.  Continue hydroxyzine as needed.  Plan: hydrOXYzine HCl (ATARAX) 25 MG tablet              Jairo Reynolds is a 43 year old, presenting for the following health issues:  Migraine        4/18/2024     3:09 PM   Additional Questions   Roomed by Lilian Roberts   Accompanied by MISSAEL     HPI     Issues today:  1-history of anxiety.  Met with a counselor on several occasions.  States that counseling was very helpful.  Previously on Celexa which he discontinued-states that mindfulness and self cares have helped the most.  He does use hydroxyzine as needed and is requesting a refill today.  Overall symptoms are much better/had gone through a good deal of stress around his divorce but states this has gotten better.    2-history of chronic acid reflux.  Symptoms improved on pantoprazole.      Patient Active Problem List   Diagnosis    Anxiety state    Non morbid obesity due to excess calories    Mild episode of recurrent major depressive disorder (H24)    Vitamin D deficiency    Prediabetes     Current Outpatient Medications   Medication Sig Dispense Refill    hydrOXYzine HCl (ATARAX) 25 MG tablet Take 1 tablet (25 mg) by mouth 3 times daily as needed for anxiety 60 tablet 3    pantoprazole (PROTONIX) 40 MG EC tablet Take 1 tablet (40 mg) by mouth daily as needed for heartburn 90 tablet 3                      Objective    /62 (BP Location: Right arm, Patient Position: Sitting, Cuff Size: Adult Regular)   Pulse 65   Temp 97.8  F (36.6  C) (Tympanic)   Resp 16   Ht 1.74 m (5' 8.5\")   Wt 98.3 kg (216 lb " 12.8 oz)   SpO2 98%   BMI 32.48 kg/m    Body mass index is 32.48 kg/m .  Physical Exam   GENERAL: alert and no distress  RESP: lungs clear to auscultation - no rales, rhonchi or wheezes  CV: regular rate and rhythm, normal S1 S2, no S3 or S4, no murmur, click or rub, no peripheral edema  MS: no gross musculoskeletal defects noted, no edema  PSYCH: mentation appears normal, affect normal/bright            Signed Electronically by: Kennedy Loredo MD

## 2024-06-22 ENCOUNTER — HEALTH MAINTENANCE LETTER (OUTPATIENT)
Age: 44
End: 2024-06-22

## 2024-08-22 ENCOUNTER — TELEPHONE (OUTPATIENT)
Dept: PEDIATRICS | Facility: CLINIC | Age: 44
End: 2024-08-22
Payer: COMMERCIAL

## 2024-08-22 DIAGNOSIS — F41.1 GAD (GENERALIZED ANXIETY DISORDER): Primary | ICD-10-CM

## 2024-08-22 RX ORDER — CITALOPRAM HYDROBROMIDE 10 MG/1
10 TABLET ORAL DAILY
Qty: 90 TABLET | Refills: 3 | Status: SHIPPED | OUTPATIENT
Start: 2024-08-22

## 2024-08-22 NOTE — TELEPHONE ENCOUNTER
Pt called requesting refill of:    citalopram (CELEXA) 10 MG tablet (Discontinued) 90 tablet 0 7/13/2023 4/18/2024 No   Sig - Route: Take 1 tablet (10 mg) by mouth daily - Oral       Pt states the new Rx (Hydroxyzine) makes him sleep for two days. Pt requesting refill as soon as possible so he can still go to work when he has an anxiety attack.    [id number] - [place], [place] - [address] AT [facility] [address]    Chetna Melton on 8/22/2024 at 10:19 AM       1.55

## 2024-08-22 NOTE — TELEPHONE ENCOUNTER
"Per chart review:    OV with Dr. Loredo 4/18/2024:  \"Issues today:  1-history of anxiety.  Met with a counselor on several occasions.  States that counseling was very helpful.  Previously on Celexa which he discontinued-states that mindfulness and self cares have helped the most.  He does use hydroxyzine as needed and is requesting a refill today.  Overall symptoms are much better/had gone through a good deal of stress around his divorce but states this has gotten better.\"    Celexa and pharmacy pended.    Dr. Loredo please review and order as appropriate.    Aga Craig RN    "

## 2025-01-06 ENCOUNTER — NURSE TRIAGE (OUTPATIENT)
Dept: PEDIATRICS | Facility: CLINIC | Age: 45
End: 2025-01-06
Payer: COMMERCIAL

## 2025-01-06 NOTE — TELEPHONE ENCOUNTER
S: Vertigo  B: x3wks    Restarted citalopram two days ago.  Stopped hydroxyzine two days ago.    A: nausea, patient reports whenever he puts his head down the room spins. Lasts a few seconds.    Denies: head injury, loss of vision, vision changes, headache, weakness, numbness, vomiting, earache    R: scheduled appointment for patient within recommended time frame of 3 days.    Chetna Yancey RN     Reason for Disposition   [1] MILD dizziness (e.g., vertigo; walking normally) AND [2] has NOT been evaluated by doctor (or NP/PA) for this    Additional Information   Negative: [1] Weakness (i.e., paralysis, loss of muscle strength) of the face, arm or leg on one side of the body AND [2] sudden onset AND [3] present now   Negative: [1] Numbness (i.e., loss of sensation) of the face, arm or leg on one side of the body AND [2] sudden onset AND [3] present now   Negative: [1] Loss of speech or garbled speech AND [2] sudden onset AND [3] present now   Negative: Difficult to awaken or acting confused (e.g., disoriented, slurred speech)   Negative: Sounds like a life-threatening emergency to the triager   Negative: SEVERE dizziness (vertigo) (e.g., unable to walk without assistance)   Negative: Severe headache (e.g., excruciating)   (Exception: Similar to previous migraines.)   Negative: [1] Dizziness (vertigo) present now AND [2] one or more STROKE RISK FACTORS (i.e., hypertension, diabetes, prior stroke/TIA, heart attack)  (Exception: Prior doctor or NP/PA evaluation for this AND no different/worse than usual.)   Negative: [1] Dizziness (vertigo) present now AND [2] age > 59   (Exception: Prior doctor or NP/PA evaluation for this AND no different/worse than usual.)   Negative: [1] Weakness (i.e., paralysis, loss of muscle strength) of the face, arm / hand, or leg / foot on one side of the body AND [2] sudden onset AND [3] brief (now gone)   Negative: [1] Numbness (i.e., loss of sensation) of the face, arm / hand, or leg /  foot on one side of the body AND [2] sudden onset AND [3] brief (now gone)   Negative: [1] Loss of speech or garbled speech AND [2] sudden onset AND [3] brief (now gone)   Negative: Loss of vision or double vision  (Exception: Similar to previous migraines.)   Negative: Patient sounds very sick or weak to the triager   Negative: Taking a medicine that could cause dizziness (e.g., phenytoin [Dilantin], carbamazepine [Tegretol], primidone [Mysoline])   Negative: [1] MODERATE dizziness (e.g., vertigo; feels very unsteady, interferes with normal activities) AND [2] has NOT been evaluated by doctor (or NP/PA) for this   Negative: [1] NO dizziness now AND [2] one or more stroke risk factors (i.e., hypertension, diabetes, prior stroke/TIA/heart attack)   Negative: [1] NO dizziness now AND [2] age > 59   Negative: Earache   Negative: Vomiting occurs with dizziness   Negative: [1] MODERATE dizziness (e.g., vertigo; feels very unsteady, interferes with normal activities) AND [2] has been evaluated by doctor (or NP/PA) for this    Protocols used: Dizziness - Vertigo-A-

## 2025-01-08 ASSESSMENT — PATIENT HEALTH QUESTIONNAIRE - PHQ9
10. IF YOU CHECKED OFF ANY PROBLEMS, HOW DIFFICULT HAVE THESE PROBLEMS MADE IT FOR YOU TO DO YOUR WORK, TAKE CARE OF THINGS AT HOME, OR GET ALONG WITH OTHER PEOPLE: NOT DIFFICULT AT ALL
SUM OF ALL RESPONSES TO PHQ QUESTIONS 1-9: 1
SUM OF ALL RESPONSES TO PHQ QUESTIONS 1-9: 1

## 2025-01-09 ENCOUNTER — OFFICE VISIT (OUTPATIENT)
Dept: PEDIATRICS | Facility: CLINIC | Age: 45
End: 2025-01-09
Payer: COMMERCIAL

## 2025-01-09 VITALS
WEIGHT: 218 LBS | SYSTOLIC BLOOD PRESSURE: 104 MMHG | HEIGHT: 70 IN | TEMPERATURE: 97 F | HEART RATE: 77 BPM | DIASTOLIC BLOOD PRESSURE: 60 MMHG | OXYGEN SATURATION: 96 % | RESPIRATION RATE: 16 BRPM | BODY MASS INDEX: 31.21 KG/M2

## 2025-01-09 DIAGNOSIS — F41.1 ANXIETY STATE: ICD-10-CM

## 2025-01-09 DIAGNOSIS — H81.10 BENIGN PAROXYSMAL POSITIONAL VERTIGO, UNSPECIFIED LATERALITY: Primary | ICD-10-CM

## 2025-01-09 ASSESSMENT — PAIN SCALES - GENERAL: PAINLEVEL_OUTOF10: NO PAIN (0)

## 2025-01-09 NOTE — PROGRESS NOTES
{PROVIDER CHARTING PREFERENCE:119203}    Jairo Reynolds is a 44 year old, presenting for the following health issues:  Vertigo        1/9/2025     9:40 AM   Additional Questions   Roomed by Fior ROACH CMA   Accompanied by Self         1/9/2025     9:40 AM   Patient Reported Additional Medications   Patient reports taking the following new medications n/a     History of Present Illness       Reason for visit:  C  Symptom onset:  1-3 days ago  Symptom intensity:  Moderate  Symptom progression:  Improving  Had these symptoms before:  No He is missing 2 dose(s) of medications per week.    States when he gets anxious or stressed he gets a headache that wont go away. Now developed dizziness    See triage note from 1/6/25: S: Vertigo  B: x3wks     Restarted citalopram two days ago.  Stopped hydroxyzine two days ago.     A: nausea, patient reports whenever he puts his head down the room spins. Lasts a few seconds.     Denies: head injury, loss of vision, vision changes, headache, weakness, numbness, vomiting, earache     R: scheduled appointment for patient within recommended time frame of 3 days.     Chetna Yancey RN      Reason for Disposition   [1] MILD dizziness (e.g., vertigo; walking normally) AND [2] has NOT been evaluated by doctor (or NP/PA) for this    Additional Information   Negative: [1] Weakness (i.e., paralysis, loss of muscle strength) of the face, arm or leg on one side of the body AND [2] sudden onset AND [3] present now   Negative: [1] Numbness (i.e., loss of sensation) of the face, arm or leg on one side of the body AND [2] sudden onset AND [3] present now   Negative: [1] Loss of speech or garbled speech AND [2] sudden onset AND [3] present now   Negative: Difficult to awaken or acting confused (e.g., disoriented, slurred speech)   Negative: Sounds like a life-threatening emergency to the triager   Negative: SEVERE dizziness (vertigo) (e.g., unable to walk without assistance)   Negative:  Severe headache (e.g., excruciating)   (Exception: Similar to previous migraines.)   Negative: [1] Dizziness (vertigo) present now AND [2] one or more STROKE RISK FACTORS (i.e., hypertension, diabetes, prior stroke/TIA, heart attack)  (Exception: Prior doctor or NP/PA evaluation for this AND no different/worse than usual.)   Negative: [1] Dizziness (vertigo) present now AND [2] age > 59   (Exception: Prior doctor or NP/PA evaluation for this AND no different/worse than usual.)   Negative: [1] Weakness (i.e., paralysis, loss of muscle strength) of the face, arm / hand, or leg / foot on one side of the body AND [2] sudden onset AND [3] brief (now gone)   Negative: [1] Numbness (i.e., loss of sensation) of the face, arm / hand, or leg / foot on one side of the body AND [2] sudden onset AND [3] brief (now gone)   Negative: [1] Loss of speech or garbled speech AND [2] sudden onset AND [3] brief (now gone)   Negative: Loss of vision or double vision  (Exception: Similar to previous migraines.)   Negative: Patient sounds very sick or weak to the triager   Negative: Taking a medicine that could cause dizziness (e.g., phenytoin [Dilantin], carbamazepine [Tegretol], primidone [Mysoline])   Negative: [1] MODERATE dizziness (e.g., vertigo; feels very unsteady, interferes with normal activities) AND [2] has NOT been evaluated by doctor (or NP/PA) for this   Negative: [1] NO dizziness now AND [2] one or more stroke risk factors (i.e., hypertension, diabetes, prior stroke/TIA/heart attack)   Negative: [1] NO dizziness now AND [2] age > 59   Negative: Earache   Negative: Vomiting occurs with dizziness   Negative: [1] MODERATE dizziness (e.g., vertigo; feels very unsteady, interferes with normal activities) AND [2] has been evaluated by doctor (or NP/PA) for this    Protocols used: Dizziness - Vertigo-A-  {MA/LPN/RN Pre-Provider Visit Orders- hCG/UA/Strep (Optional):813754}  {SUPERLIST (Optional):478686}  {additonal problems for  "provider to add (Optional):013474}    {ROS Picklists (Optional):519180}      Objective    /60   Pulse 77   Temp 97  F (36.1  C) (Temporal)   Resp 16   Ht 1.765 m (5' 9.5\")   Wt 98.9 kg (218 lb)   SpO2 96%   BMI 31.73 kg/m    Body mass index is 31.73 kg/m .  Physical Exam   {Exam List (Optional):174234}    {Diagnostic Test Results (Optional):360432}        Signed Electronically by: Kennedy Loredo MD  {Email feedback regarding this note to [email or web address]   :[id number]}  "

## 2025-06-02 ENCOUNTER — TELEPHONE (OUTPATIENT)
Dept: PEDIATRICS | Facility: CLINIC | Age: 45
End: 2025-06-02
Payer: COMMERCIAL

## 2025-06-02 DIAGNOSIS — L30.9 DERMATITIS: ICD-10-CM

## 2025-06-03 RX ORDER — TRIAMCINOLONE ACETONIDE 1 MG/G
CREAM TOPICAL 2 TIMES DAILY PRN
Qty: 45 G | Refills: 1 | Status: SHIPPED | OUTPATIENT
Start: 2025-06-03

## 2025-06-03 NOTE — TELEPHONE ENCOUNTER
Left message for patient to call back.    When patient calls back:  Please inquire what needs cream for. Potentially needs a visit to discuss.    Thanks!  Dorothea ADRIAN RN, BSN  Clinic RN  [facility]

## 2025-06-03 NOTE — TELEPHONE ENCOUNTER
"Patient returning call. States needs cream \"starting with P\" states this was prescribed maybe 5 years ago by Dr. Loredo.    Per chart review determined this medication is triamcinolone. Patient very frustrated during the call after informed he will need some sort of visit for this prescription as has not been seen for this or prescribed it in several years and was not discussed at [facility] in January.     Attempted to explain to patient use of e-visit to get medication as soon as possible if it is appropriate. Patient declined and then reveals he just wants to schedule his AWV and then Dr. Loredo will prescribe him the cream as he is scheduled. Last AWV was 4/18/2024.    Scheduled for AWV with Dr. Loredo 7/3/25, soonest OV available. Informed will inquire if PCP will prescribe this now but he may request to see patient first as this is an old medication.     Patient declined to discuss symptoms causing need for cream at this time. States he just needs it as soon as possible. States he can came into the clinic and talk with this writer's manager to tell them \"what happened on the phone\" and can get the medication \"right there like that\" and is frustrated that this happens \"every time with this medication.\"    Per chart review  triamcinolone (KENALOG) 0.1 % external cream (Discontinued) 80 g 3 5/19/2022 1/23/2023 No   Sig - Route: Apply topically 2 times daily as needed for irritation - Topical     Dr. Loredo please review and advise. Agreeable to fill, or request visit first?    Aga Craig, TYSHAWN Craig, RN     "

## 2025-07-03 ENCOUNTER — OFFICE VISIT (OUTPATIENT)
Dept: PEDIATRICS | Facility: CLINIC | Age: 45
End: 2025-07-03
Payer: COMMERCIAL

## 2025-07-03 VITALS
HEART RATE: 60 BPM | HEIGHT: 69 IN | BODY MASS INDEX: 32.14 KG/M2 | RESPIRATION RATE: 16 BRPM | OXYGEN SATURATION: 98 % | SYSTOLIC BLOOD PRESSURE: 106 MMHG | WEIGHT: 217 LBS | DIASTOLIC BLOOD PRESSURE: 73 MMHG | TEMPERATURE: 98.5 F

## 2025-07-03 DIAGNOSIS — F33.0 MILD EPISODE OF RECURRENT MAJOR DEPRESSIVE DISORDER: ICD-10-CM

## 2025-07-03 DIAGNOSIS — F41.1 GAD (GENERALIZED ANXIETY DISORDER): ICD-10-CM

## 2025-07-03 DIAGNOSIS — L30.9 DERMATITIS: ICD-10-CM

## 2025-07-03 DIAGNOSIS — Z00.00 ROUTINE GENERAL MEDICAL EXAMINATION AT A HEALTH CARE FACILITY: Primary | ICD-10-CM

## 2025-07-03 DIAGNOSIS — Z12.11 SCREENING FOR COLON CANCER: ICD-10-CM

## 2025-07-03 DIAGNOSIS — K21.00 GASTROESOPHAGEAL REFLUX DISEASE WITH ESOPHAGITIS, UNSPECIFIED WHETHER HEMORRHAGE: ICD-10-CM

## 2025-07-03 DIAGNOSIS — R73.03 PREDIABETES: ICD-10-CM

## 2025-07-03 DIAGNOSIS — Z13.220 LIPID SCREENING: ICD-10-CM

## 2025-07-03 LAB
ALBUMIN SERPL BCG-MCNC: 4.2 G/DL (ref 3.5–5.2)
ALP SERPL-CCNC: 65 U/L (ref 40–150)
ALT SERPL W P-5'-P-CCNC: 54 U/L (ref 0–70)
ANION GAP SERPL CALCULATED.3IONS-SCNC: 9 MMOL/L (ref 7–15)
AST SERPL W P-5'-P-CCNC: 37 U/L (ref 0–45)
BILIRUB SERPL-MCNC: 0.5 MG/DL
BUN SERPL-MCNC: 8.6 MG/DL (ref 6–20)
CALCIUM SERPL-MCNC: 9.2 MG/DL (ref 8.8–10.4)
CHLORIDE SERPL-SCNC: 104 MMOL/L (ref 98–107)
CHOLEST SERPL-MCNC: 212 MG/DL
CREAT SERPL-MCNC: 0.79 MG/DL (ref 0.67–1.17)
EGFRCR SERPLBLD CKD-EPI 2021: >90 ML/MIN/1.73M2
EST. AVERAGE GLUCOSE BLD GHB EST-MCNC: 137 MG/DL
FASTING STATUS PATIENT QL REPORTED: YES
FASTING STATUS PATIENT QL REPORTED: YES
GLUCOSE SERPL-MCNC: 111 MG/DL (ref 70–99)
HBA1C MFR BLD: 6.4 % (ref 0–5.6)
HCO3 SERPL-SCNC: 25 MMOL/L (ref 22–29)
HDLC SERPL-MCNC: 45 MG/DL
LDLC SERPL CALC-MCNC: 147 MG/DL
NONHDLC SERPL-MCNC: 167 MG/DL
POTASSIUM SERPL-SCNC: 4.4 MMOL/L (ref 3.4–5.3)
PROT SERPL-MCNC: 6.9 G/DL (ref 6.4–8.3)
SODIUM SERPL-SCNC: 138 MMOL/L (ref 135–145)
TRIGL SERPL-MCNC: 100 MG/DL

## 2025-07-03 RX ORDER — CITALOPRAM HYDROBROMIDE 10 MG/1
10 TABLET ORAL DAILY
Qty: 90 TABLET | Refills: 3 | Status: SHIPPED | OUTPATIENT
Start: 2025-07-03

## 2025-07-03 RX ORDER — TRIAMCINOLONE ACETONIDE 1 MG/G
CREAM TOPICAL 2 TIMES DAILY PRN
Qty: 45 G | Refills: 6 | Status: SHIPPED | OUTPATIENT
Start: 2025-07-03

## 2025-07-03 RX ORDER — HYDROXYZINE HYDROCHLORIDE 25 MG/1
25 TABLET, FILM COATED ORAL 3 TIMES DAILY PRN
Qty: 60 TABLET | Refills: 3 | Status: SHIPPED | OUTPATIENT
Start: 2025-07-03

## 2025-07-03 RX ORDER — PANTOPRAZOLE SODIUM 40 MG/1
40 TABLET, DELAYED RELEASE ORAL DAILY PRN
Qty: 90 TABLET | Refills: 3 | Status: SHIPPED | OUTPATIENT
Start: 2025-07-03

## 2025-07-03 SDOH — HEALTH STABILITY: PHYSICAL HEALTH: ON AVERAGE, HOW MANY DAYS PER WEEK DO YOU ENGAGE IN MODERATE TO STRENUOUS EXERCISE (LIKE A BRISK WALK)?: 0 DAYS

## 2025-07-03 SDOH — HEALTH STABILITY: PHYSICAL HEALTH: ON AVERAGE, HOW MANY MINUTES DO YOU ENGAGE IN EXERCISE AT THIS LEVEL?: 0 MIN

## 2025-07-03 ASSESSMENT — PAIN SCALES - GENERAL: PAINLEVEL_OUTOF10: NO PAIN (0)

## 2025-07-03 ASSESSMENT — PATIENT HEALTH QUESTIONNAIRE - PHQ9: SUM OF ALL RESPONSES TO PHQ QUESTIONS 1-9: 1

## 2025-07-03 ASSESSMENT — SOCIAL DETERMINANTS OF HEALTH (SDOH): HOW OFTEN DO YOU GET TOGETHER WITH FRIENDS OR RELATIVES?: MORE THAN THREE TIMES A WEEK

## 2025-07-03 NOTE — PROGRESS NOTES
"Preventive Care Visit  [facility] SETH Loredo MD, Internal Medicine - Pediatrics  Jul 3, 2025      Assessment & Plan     (Z00.00) Routine general medical examination at a health care facility  (primary encounter diagnosis)    (R73.03) Prediabetes  Comment:   Plan: Hemoglobin A1c         Discussed diet, weight mgmt.  Due for labwork    (F33.0) Mild episode of recurrent major depressive disorder  (F41.1) GEOVANY (generalized anxiety disorder)  Comment:   Plan: citalopram (CELEXA) 10 MG tablet        Mood stable, continue current regimen    (K21.00) Gastroesophageal reflux disease with esophagitis, unspecified whether hemorrhage  Comment:   Plan: pantoprazole (PROTONIX) 40 MG EC tablet        Continue PPI prn    (L30.9) Dermatitis  Comment:   Plan: triamcinolone (KENALOG) 0.1 % external cream          (Z13.220) Lipid screening  Comment:   Plan: Lipid panel reflex to direct LDL Fasting,         Comprehensive metabolic panel (BMP + Alb, Alk         Phos, ALT, AST, Total. Bili, TP)          (Z12.11) Screening for colon cancer  Comment:   Plan: Colonoscopy Screening  Referral                        BMI  Estimated body mass index is 32.4 kg/m  as calculated from the following:    Height as of this encounter: 1.743 m (5' 8.62\").    Weight as of this encounter: 98.4 kg (217 lb).       Counseling  Appropriate preventive services were addressed with this patient via screening, questionnaire, or discussion as appropriate for fall prevention, nutrition, physical activity, Tobacco-use cessation, social engagement, weight loss and cognition.  Checklist reviewing preventive services available has been given to the patient.  Reviewed patient's diet, addressing concerns and/or questions.   The patient was instructed to see the dentist every 6 months.             Jairo Reynolds is a 45 year old, presenting for the following:  Physical        7/3/2025     9:36 AM   Additional Questions   Roomed by Keon" H   Accompanied by Self         7/3/2025     9:36 AM   Patient Reported Additional Medications   Patient reports taking the following new medications No          HPI           Advance Care Planning            7/3/2025   General Health   How would you rate your overall physical health? Excellent   Feel stress (tense, anxious, or unable to sleep) Not at all         7/3/2025   Nutrition   Three or more servings of calcium each day? Yes   Diet: Regular (no restrictions)   How many servings of fruit and vegetables per day? (!) 0-1   How many sweetened beverages each day? (!) 2         7/3/2025   Exercise   Days per week of moderate/strenous exercise 0 days   Average minutes spent exercising at this level 0 min   (!) EXERCISE CONCERN      7/3/2025   Social Factors   Frequency of gathering with friends or relatives More than three times a week   Worry food won't last until get money to buy more No   Food not last or not have enough money for food? No   Do you have housing? (Housing is defined as stable permanent housing and does not include staying outside in a car, in a tent, in an abandoned building, in an overnight shelter, or couch-surfing.) Yes   Are you worried about losing your housing? No   Lack of transportation? No   Unable to get utilities (heat,electricity)? No         7/3/2025   Dental   Dentist two times every year? (!) NO       Today's PHQ-9 Score:       7/3/2025     9:46 AM   PHQ-9 SCORE   PHQ-9 Total Score 1         7/3/2025   Substance Use   Alcohol more than 3/day or more than 7/wk No   Do you use any other substances recreationally? No     Social History     Tobacco Use    Smoking status: Never     Passive exposure: Never    Smokeless tobacco: Never   Vaping Use    Vaping status: Never Used   Substance Use Topics    Alcohol use: No    Drug use: No           7/3/2025   STI Screening   New sexual partner(s) since last STI/HIV test? No   ASCVD Risk   The 10-year ASCVD risk score (Mamie CARBALLO, et  al., 2019) is: 3%    Values used to calculate the score:      Age: 45 years      Sex: Male      Is Non- : Yes      Diabetic: No      Tobacco smoker: No      Systolic Blood Pressure: 106 mmHg      Is BP treated: No      HDL Cholesterol: 47 mg/dL      Total Cholesterol: 209 mg/dL        7/3/2025   Contraception/Family Planning   Questions about contraception or family planning No        Reviewed and updated as needed this visit by Provider                    Patient Active Problem List   Diagnosis    Anxiety state    Non morbid obesity due to excess calories    Mild episode of recurrent major depressive disorder    Vitamin D deficiency    Prediabetes     Past Surgical History:   Procedure Laterality Date    DAVINCI LAPAROSCOPIC CHOLECYSTECTOMY WITHOUT GRAMS N/A 9/9/2022    Procedure: CHOLECYSTECTOMY, ROBOT-ASSISTED, LAPAROSCOPIC, WITHOUT CHOLANGIOGRAM;  Surgeon: Stephon Morgan MD;  [place] [facility] [place]    ESOPHAGOSCOPY, GASTROSCOPY, DUODENOSCOPY (EGD), COMBINED N/A 8/22/2022    Procedure: ESOPHAGOGASTRODUODENOSCOPY, WITH BIOPSY;  Surgeon: Anson Mercer MD;  Location:  GI       Social History     Tobacco Use    Smoking status: Never     Passive exposure: Never    Smokeless tobacco: Never   Substance Use Topics    Alcohol use: No     Family History   Problem Relation Age of Onset    Diabetes Mother     Unknown/Adopted Father          Current Outpatient Medications   Medication Sig Dispense Refill    citalopram (CELEXA) 10 MG tablet Take 1 tablet (10 mg) by mouth daily. 90 tablet 3    hydrOXYzine HCl (ATARAX) 25 MG tablet Take 1 tablet (25 mg) by mouth 3 times daily as needed for anxiety 60 tablet 3    pantoprazole (PROTONIX) 40 MG EC tablet Take 1 tablet (40 mg) by mouth daily as needed for heartburn 90 tablet 3    triamcinolone (KENALOG) 0.1 % external cream Apply topically 2 times daily as needed for irritation. Do not apply to face 45 g 1         Review of  "Systems  Constitutional, neuro, ENT, endocrine, pulmonary, cardiac, gastrointestinal, genitourinary, musculoskeletal, integument and psychiatric systems are negative, except as otherwise noted.     Objective    Exam  /73 (BP Location: Right arm, Patient Position: Sitting, Cuff Size: Adult Large)   Pulse 60   Temp 98.5  F (36.9  C) (Tympanic)   Resp 16   Ht 1.743 m (5' 8.62\")   Wt 98.4 kg (217 lb)   SpO2 98%   BMI 32.40 kg/m     Estimated body mass index is 32.4 kg/m  as calculated from the following:    Height as of this encounter: 1.743 m (5' 8.62\").    Weight as of this encounter: 98.4 kg (217 lb).    Physical Exam  GENERAL: alert and no distress  EYES: Eyes grossly normal to inspection, PERRL and conjunctivae and sclerae normal  HENT: ear canals and TM's normal, nose and mouth without ulcers or lesions  NECK: no adenopathy, no asymmetry, masses, or scars  RESP: lungs clear to auscultation - no rales, rhonchi or wheezes  CV: regular rate and rhythm, normal S1 S2, no S3 or S4, no murmur, click or rub, no peripheral edema  ABDOMEN: soft, nontender, no hepatosplenomegaly, no masses and bowel sounds normal  MS: no gross musculoskeletal defects noted, no edema  SKIN: no suspicious lesions or rashes  NEURO: Normal strength and tone, mentation intact and speech normal  PSYCH: mentation appears normal, affect normal/bright        Signed Electronically by: Kennedy Loredo MD    "

## 2025-07-03 NOTE — PATIENT INSTRUCTIONS
Patient Education   Preventive Care Advice   This is general advice given by our system to help you stay healthy. However, your care team may have specific advice just for you. Please talk to your care team about your preventive care needs.  Nutrition  Eat 5 or more servings of fruits and vegetables each day.  Try wheat bread, brown rice and whole grain pasta (instead of white bread, rice, and pasta).  Get enough calcium and vitamin D. Check the label on foods and aim for 100% of the RDA (recommended daily allowance).  Lifestyle  Exercise at least 150 minutes each week  (30 minutes a day, 5 days a week).  Do muscle strengthening activities 2 days a week. These help control your weight and prevent disease.  No smoking.  Wear sunscreen to prevent skin cancer.  Have a dental exam and cleaning every 6 months.  Yearly exams  See your health care team every year to talk about:  Any changes in your health.  Any medicines your care team has prescribed.  Preventive care, family planning, and ways to prevent chronic diseases.  Shots (vaccines)   HPV shots (up to age 26), if you've never had them before.  Hepatitis B shots (up to age 59), if you've never had them before.  COVID-19 shot: Get this shot when it's due.  Flu shot: Get a flu shot every year.  Tetanus shot: Get a tetanus shot every 10 years.  Pneumococcal, hepatitis A, and RSV shots: Ask your care team if you need these based on your risk.  Shingles shot (for age 50 and up)  General health tests  Diabetes screening:  Starting at age 35, Get screened for diabetes at least every 3 years.  If you are younger than age 35, ask your care team if you should be screened for diabetes.  Cholesterol test: At age 39, start having a cholesterol test every 5 years, or more often if advised.  Bone density scan (DEXA): At age 50, ask your care team if you should have this scan for osteoporosis (brittle bones).  Hepatitis C: Get tested at least once in your life.  STIs (sexually  transmitted infections)  Before age 24: Ask your care team if you should be screened for STIs.  After age 24: Get screened for STIs if you're at risk. You are at risk for STIs (including HIV) if:  You are sexually active with more than one person.  You don't use condoms every time.  You or a partner was diagnosed with a sexually transmitted infection.  If you are at risk for HIV, ask about PrEP medicine to prevent HIV.  Get tested for HIV at least once in your life, whether you are at risk for HIV or not.  Cancer screening tests  Cervical cancer screening: If you have a cervix, begin getting regular cervical cancer screening tests starting at age 21.  Breast cancer scan (mammogram): If you've ever had breasts, begin having regular mammograms starting at age 40. This is a scan to check for breast cancer.  Colon cancer screening: It is important to start screening for colon cancer at age 45.  Have a colonoscopy test every 10 years (or more often if you're at risk) Or, ask your provider about stool tests like a FIT test every year or Cologuard test every 3 years.  To learn more about your testing options, visit:   .  For help making a decision, visit:   https://bit.ly/rc43793.  Prostate cancer screening test: If you have a prostate, ask your care team if a prostate cancer screening test (PSA) at age 55 is right for you.  Lung cancer screening: If you are a current or former smoker ages 50 to 80, ask your care team if ongoing lung cancer screenings are right for you.  For informational purposes only. Not to replace the advice of your health care provider. Copyright   2023 [facility]. All rights reserved. Clinically reviewed by the [facility] Transitions Program. [employer] 370290 - REV 01/24.  Learning About Being Physically Active  What is physical activity?     Being physically active means doing any kind of activity that gets your body moving.  The types of physical activity that can help you get  "fit and stay healthy include:  Aerobic or \"cardio\" activities. These make your heart beat faster and make you breathe harder, such as brisk walking, riding a bike, or running. They strengthen your heart and lungs and build up your endurance.  Strength training activities. These make your muscles work against, or \"resist,\" something. Examples include lifting weights or doing push-ups. These activities help tone and strengthen your muscles and bones.  Stretches. These let you move your joints and muscles through their full range of motion. Stretching helps you be more flexible.  Reaching a balance between these three types of physical activity is important because each one contributes to your overall fitness.  What are the benefits of being active?  Being active is one of the best things you can do for your health. It helps you to:  Feel stronger and have more energy to do all the things you like to do.  Focus better at school or work.  Feel, think, and sleep better.  Reach and stay at a healthy weight.  Lose fat and build lean muscle.  Lower your risk for serious health problems, including diabetes, heart attack, high blood pressure, and some cancers.  Keep your heart, lungs, bones, muscles, and joints strong and healthy.  How can you make being active part of your life?  Start slowly. Make it your long-term goal to get at least 30 minutes of exercise on most days of the week. Walking is a good choice. You also may want to do other activities, such as running, swimming, cycling, or playing tennis or team sports.  Pick activities that you like--ones that make your heart beat faster, your muscles stronger, and your muscles and joints more flexible. If you find more than one thing you like doing, do them all. You don't have to do the same thing every day.  Get your heart pumping every day. Any activity that makes your heart beat faster and keeps it at that rate for a while counts.  Here are some great ways to get your " "heart beating faster:  Go for a brisk walk, run, or hike.  Go for a swim or bike ride.  Take an online exercise class or dance.  Play a game of touch football, basketball, or soccer.  Play tennis, pickleball, or racquetball.  Climb stairs.  Even some household chores can be aerobic. Just do them at a faster pace. Raking or mowing the lawn, sweeping the garage, and vacuuming and cleaning your home all can help get your heart rate up.  Strengthen your muscles during the week. You don't have to lift heavy weights or grow big, bulky muscles to get stronger. Doing a few simple activities that make your muscles work against, or \"resist,\" something can help you get stronger. Aim for at least twice a week.  For example, you can:  Do push-ups or sit-ups, which use your own body weight as resistance.  Lift weights or dumbbells or use stretch bands at home or in a gym or community center.  Stretch your muscles often. Stretching will help you as you become more active. It can help you stay flexible and loosen tight muscles. It can also help improve your balance and posture and can be a great way to relax.  Be sure to stretch the muscles you'll be using when you work out. It's best to warm your muscles slightly before you stretch them. Walk or do some other light aerobic activity for a few minutes. Then start stretching.  When you stretch your muscles:  Do it slowly. Stretching is not about going fast or making sudden movements.  Don't push or bounce during a stretch.  Hold each stretch for at least 15 to 30 seconds, if you can. You should feel a stretch in the muscle, but not pain.  Breathe out as you do the stretch. Then breathe in as you hold the stretch. Don't hold your breath.  If you're worried about how more activity might affect your health, have a checkup before you start. Follow any special advice your doctor gives you for getting a smart start.  Where can you learn more?  Go to " "https://www.[employer].net/patiented  Enter W332 in the search box to learn more about \"Learning About Being Physically Active.\"  Current as of: July 31, 2024  Content Version: 14.5 2024-2025 [employer].   Care instructions adapted under license by your healthcare professional. If you have questions about a medical condition or this instruction, always ask your healthcare professional. [employer] disclaims any warranty or liability for your use of this information.    Eating Healthy Foods: Care Instructions  With every meal, you can make healthy food choices. Try to eat a variety of fruits, vegetables, whole grains, lean proteins, and low-fat dairy products. This can help you get the right balance of nutrients, including vitamins and minerals. Small changes add up over time. You can start by adding one healthy food to your meals each day.    Try to make half your plate fruits and vegetables, one-fourth whole grains, and one-fourth lean proteins. Try including dairy with your meals.   Eat more fruits and vegetables. Try to have them with most meals and snacks.   Foods for healthy eating        Fruits   These can be fresh, frozen, canned, or dried.  Try to choose whole fruit rather than fruit juice.  Eat a variety of colors.        Vegetables   These can be fresh, frozen, canned, or dried.  Beans, peas, and lentils count too.        Whole grains   Choose whole-grain breads, cereals, and noodles.  Try brown rice.        Lean proteins   These can include lean meat, poultry, fish, and eggs.  You can also have tofu, beans, peas, lentils, nuts, and seeds.        Dairy   Try milk, yogurt, and cheese.  Choose low-fat or fat-free when you can.  If you need to, use lactose-free milk or fortified plant-based milk products, such as soy milk.        Water   Drink water when you're thirsty.  Limit sugar-sweetened drinks, including soda, fruit drinks, and sports drinks.  Where can you learn more?  Go to " "https://www.[facility] [place].net/patiented  Enter T756 in the search box to learn more about \"Eating Healthy Foods: Care Instructions.\"  Current as of: October 7, 2024  Content Version: 14.5 2024-2025 [employer].   Care instructions adapted under license by your healthcare professional. If you have questions about a medical condition or this instruction, always ask your healthcare professional. [employer] disclaims any warranty or liability for your use of this information.       "

## (undated) DEVICE — SYR 30ML SLIP TIP W/O NDL 302833

## (undated) DEVICE — SURGICEL HEMOSTAT 4X8" 1952

## (undated) DEVICE — GLOVE PROTEXIS POWDER FREE SMT 7.5  2D72PT75X

## (undated) DEVICE — ENDO TROCAR FIRST ENTRY KII FIOS Z-THRD 05X100MM CTF03

## (undated) DEVICE — BLADE CLIPPER SGL USE 9680

## (undated) DEVICE — DAVINCI XI CLIP APPLIER LG HEM-O-CLIP 8MM 470230

## (undated) DEVICE — ESU GROUND PAD ADULT W/CORD E7507

## (undated) DEVICE — SUCTION IRR STRYKERFLOW II W/TIP 250-070-520

## (undated) DEVICE — GOWN XLG DISP 9545

## (undated) DEVICE — SU MONOCRYL 4-0 PS-2 27" UND Y426H

## (undated) DEVICE — ESU PENCIL W/COATED BLADE E2450H

## (undated) DEVICE — PREP CHLORAPREP 26ML TINTED ORANGE  260815

## (undated) DEVICE — DAVINCI XI FCP BIPOLAR FENESTRATED 470205

## (undated) DEVICE — SU VICRYL 0 UR-6 27" J603H

## (undated) DEVICE — DRAPE MAYO STAND 23X54 8337

## (undated) DEVICE — SOL WATER IRRIG 500ML BOTTLE 2F7113

## (undated) DEVICE — DAVINCI XI SEAL UNIVERSAL 5-8MM 470361

## (undated) DEVICE — ANTIFOG SOLUTION W/FOAM PAD CF-1001

## (undated) DEVICE — DRSG STERI STRIP 1/2X4" R1547

## (undated) DEVICE — DRSG GAUZE 4X4" 3033

## (undated) DEVICE — DAVINCI XI DRAPE COLUMN 470341

## (undated) DEVICE — CLIP ENDO HEMO-LOC PURPLE LG 544240

## (undated) DEVICE — LINEN TOWEL PACK X5 5464

## (undated) DEVICE — SPECIMEN CONTAINER W/10% BUFFERED FORMALIN 120ML 591201

## (undated) DEVICE — NDL INSUFFLATION 13GA 120MM C2201

## (undated) DEVICE — DAVINCI XI DRAPE ARM 470015

## (undated) DEVICE — DAVINCI XI CAUTERY HOOK 470183

## (undated) DEVICE — TAPE MEDIPORE 4"X2YD 2864

## (undated) DEVICE — PACK LAP CHOLE CUSTOM ASC

## (undated) RX ORDER — BUPIVACAINE HYDROCHLORIDE 2.5 MG/ML
INJECTION, SOLUTION EPIDURAL; INFILTRATION; INTRACAUDAL
Status: DISPENSED
Start: 2022-09-09

## (undated) RX ORDER — HYDROMORPHONE HYDROCHLORIDE 1 MG/ML
INJECTION, SOLUTION INTRAMUSCULAR; INTRAVENOUS; SUBCUTANEOUS
Status: DISPENSED
Start: 2022-09-09

## (undated) RX ORDER — FENTANYL CITRATE 50 UG/ML
INJECTION, SOLUTION INTRAMUSCULAR; INTRAVENOUS
Status: DISPENSED
Start: 2022-08-22

## (undated) RX ORDER — EPINEPHRINE 1 MG/ML
INJECTION, SOLUTION INTRAMUSCULAR; SUBCUTANEOUS
Status: DISPENSED
Start: 2022-09-09

## (undated) RX ORDER — PROPOFOL 10 MG/ML
INJECTION, EMULSION INTRAVENOUS
Status: DISPENSED
Start: 2022-09-09

## (undated) RX ORDER — FENTANYL CITRATE 50 UG/ML
INJECTION, SOLUTION INTRAMUSCULAR; INTRAVENOUS
Status: DISPENSED
Start: 2022-09-09

## (undated) RX ORDER — LIDOCAINE HYDROCHLORIDE 20 MG/ML
INJECTION, SOLUTION EPIDURAL; INFILTRATION; INTRACAUDAL; PERINEURAL
Status: DISPENSED
Start: 2022-09-09

## (undated) RX ORDER — BUPIVACAINE HYDROCHLORIDE AND EPINEPHRINE 5; 5 MG/ML; UG/ML
INJECTION, SOLUTION EPIDURAL; INTRACAUDAL; PERINEURAL
Status: DISPENSED
Start: 2022-09-09

## (undated) RX ORDER — INDOCYANINE GREEN AND WATER 25 MG
KIT INJECTION
Status: DISPENSED
Start: 2022-09-09

## (undated) RX ORDER — CEFAZOLIN SODIUM 2 G/50ML
SOLUTION INTRAVENOUS
Status: DISPENSED
Start: 2022-09-09

## (undated) RX ORDER — ONDANSETRON 2 MG/ML
INJECTION INTRAMUSCULAR; INTRAVENOUS
Status: DISPENSED
Start: 2022-09-09

## (undated) RX ORDER — DEXAMETHASONE SODIUM PHOSPHATE 4 MG/ML
INJECTION, SOLUTION INTRA-ARTICULAR; INTRALESIONAL; INTRAMUSCULAR; INTRAVENOUS; SOFT TISSUE
Status: DISPENSED
Start: 2022-09-09

## (undated) RX ORDER — GLYCOPYRROLATE 0.2 MG/ML
INJECTION INTRAMUSCULAR; INTRAVENOUS
Status: DISPENSED
Start: 2022-09-09

## (undated) RX ORDER — KETAMINE HYDROCHLORIDE 10 MG/ML
INJECTION, SOLUTION INTRAMUSCULAR; INTRAVENOUS
Status: DISPENSED
Start: 2022-09-09

## (undated) RX ORDER — ACETAMINOPHEN 325 MG/1
TABLET ORAL
Status: DISPENSED
Start: 2022-09-09

## (undated) RX ORDER — KETOROLAC TROMETHAMINE 30 MG/ML
INJECTION, SOLUTION INTRAMUSCULAR; INTRAVENOUS
Status: DISPENSED
Start: 2022-09-09

## (undated) RX ORDER — OXYCODONE HYDROCHLORIDE 5 MG/1
TABLET ORAL
Status: DISPENSED
Start: 2022-09-09